# Patient Record
Sex: MALE | Race: WHITE | NOT HISPANIC OR LATINO | URBAN - METROPOLITAN AREA
[De-identification: names, ages, dates, MRNs, and addresses within clinical notes are randomized per-mention and may not be internally consistent; named-entity substitution may affect disease eponyms.]

---

## 2018-03-19 RX ORDER — ZOLPIDEM TARTRATE 10 MG/1
10 TABLET ORAL NIGHTLY
COMMUNITY
Start: 2017-04-24 | End: 2018-03-19 | Stop reason: SDUPTHER

## 2018-03-20 RX ORDER — ZOLPIDEM TARTRATE 10 MG/1
10 TABLET ORAL NIGHTLY
Qty: 90 TABLET | Refills: 0 | Status: SHIPPED | OUTPATIENT
Start: 2018-03-20 | End: 2019-07-29 | Stop reason: SDUPTHER

## 2018-03-20 NOTE — TELEPHONE ENCOUNTER
[facility] website queried no abnormalities noted.  Patient is only receiving controlled substances from my office  Prescription sent electronically

## 2018-04-09 ENCOUNTER — PATIENT OUTREACH (OUTPATIENT)
Dept: CASE MANAGEMENT | Facility: CLINIC | Age: 68
End: 2018-04-09

## 2018-04-09 RX ORDER — PANTOPRAZOLE SODIUM 40 MG/1
40 TABLET, DELAYED RELEASE ORAL
COMMUNITY
End: 2018-09-17 | Stop reason: SDUPTHER

## 2018-04-09 RX ORDER — METFORMIN HYDROCHLORIDE 500 MG/1
500 TABLET ORAL
COMMUNITY
End: 2018-08-27 | Stop reason: SDUPTHER

## 2018-04-09 RX ORDER — MULTIVITAMIN
TABLET ORAL
COMMUNITY
Start: 2015-07-20

## 2018-04-09 RX ORDER — SIMVASTATIN 10 MG/1
10 TABLET, FILM COATED ORAL NIGHTLY
COMMUNITY
End: 2018-11-12 | Stop reason: SDUPTHER

## 2018-04-09 RX ORDER — ASPIRIN 81 MG/1
81 TABLET ORAL DAILY
COMMUNITY
End: 2022-07-24

## 2018-04-09 NOTE — PROGRESS NOTES
"NAME: Zeb Jefferson    MRN: [id number]    YOB: 1950    EVENT DETAILS    Discharging Facility: [facility]  Date of Admission: 04/06/18  Date of Discharge: 04/06/18  Discharge Instructions Reviewed?: Yes         Reason for Admission:  Chest pain      HPI: Spoke with pt. Pt admitted with \"pounding\" in his chest. Pt had been out to dinner and had a cup of coffee late in the evening. Pt developed episodes of \"pounding\" that lasted a few seconds at a time. Pt became concerned when he started to feel weak and tired. In Ed, EKG showed PVC's. Pt ruled out for a MI .     Pt denies chest pain, shortness of breath, lightheadedness, dizziness, nausea, diaphoresis, palpitations, etc. Discussed things that can precipitate PVC's-fatigue, alcohol, caffeine, stress, lack of sleep, etc.       MEDICATION REVIEW:    Reported by:: Patient  Prescriptions Filled?: No (No new medications)     Was a medication discrepancy indentified?: No     Medication understanding?: Yes     Medication Adherence?: Yes     Any side effects from medication?: No       Medication review Reviewed, see medication history    Additional Comments:      FOLLOW-UP TESTS/PROCEDURES:    PCP: 4/10  Dr. Gautam (cardio): needs to schedule  Stress Test: needs to schedule      BARRIERS TO CARE    Self-Care   Living Arrangement: Alone (Pt lives alone. Pt independent in his care. Pt has family/friends who can help if needed. )       Socioeconomic  Financial Problems?: No     Transportation Issues?: No            PLAN OF CARE:    Reviewed signs/symptoms of worsening condition or complication that necessitate a call to the Physician's office.  Educated patient on access to care.  RN phone number given for future care management needs.       Zeynep Shah RN  [phone]    "

## 2018-04-10 ENCOUNTER — OFFICE VISIT (OUTPATIENT)
Dept: FAMILY MEDICINE | Facility: CLINIC | Age: 68
End: 2018-04-10
Payer: MEDICARE

## 2018-04-10 VITALS
DIASTOLIC BLOOD PRESSURE: 62 MMHG | HEART RATE: 66 BPM | WEIGHT: 140 LBS | HEIGHT: 65 IN | TEMPERATURE: 97.7 F | BODY MASS INDEX: 23.32 KG/M2 | OXYGEN SATURATION: 98 % | RESPIRATION RATE: 16 BRPM | SYSTOLIC BLOOD PRESSURE: 130 MMHG

## 2018-04-10 DIAGNOSIS — K22.710 BARRETT'S ESOPHAGUS WITH LOW GRADE DYSPLASIA: ICD-10-CM

## 2018-04-10 DIAGNOSIS — R07.2 PRECORDIAL PAIN: Primary | ICD-10-CM

## 2018-04-10 PROCEDURE — 99213 OFFICE O/P EST LOW 20 MIN: CPT | Performed by: FAMILY MEDICINE

## 2018-04-10 NOTE — PROGRESS NOTES
Subjective      Patient ID: Zeb Jefferson is a 67 y.o. male.    Hospitals in [place]    Facility/Provider transitioning from:  Was the patient treated and released from Er?  Was the patient admitted to hospital?  Date of discharge?  Was the patient contacted within 2 business days of discharge?  New meds?  Discontinued meds?    The following have been reviewed and updated as appropriate in this visit:       Review of Systems    Current Outpatient Prescriptions   Medication Sig Dispense Refill   • aspirin 81 mg enteric coated tablet Take 81 mg by mouth daily.     • metFORMIN (GLUCOPHAGE) 500 mg tablet Take 500 mg by mouth daily with breakfast.       • multivitamin (THERAGRAN) tablet take 1 tablet by oral route  every day     • pantoprazole (PROTONIX) 40 mg EC tablet Take 40 mg by mouth daily before breakfast.     • simvastatin (ZOCOR) 10 mg tablet Take 10 mg by mouth nightly.     • zolpidem (AMBIEN) 10 mg tablet Take 1 tablet (10 mg total) by mouth nightly. 90 tablet 0     No current facility-administered medications for this visit.      Past Medical History:   Diagnosis Date   • Melanoma (CMS/HCC) (HCC)      History reviewed. No pertinent family history.  History reviewed. No pertinent surgical history.  Allergies   Allergen Reactions   • Penicillins      Social History     Social History   • Marital status:      Spouse name: N/A   • Number of children: N/A   • Years of education: N/A     Occupational History   • Not on file.     Social History Main Topics   • Smoking status: Former Smoker   • Smokeless tobacco: Never Used   • Alcohol use Not on file   • Drug use: Unknown   • Sexual activity: Not on file     Other Topics Concern   • Not on file     Social History Narrative   • No narrative on file         Objective     Physical Exam    Assessment/Plan   Problem List Items Addressed This Visit     None          Della Hernandez DO  4/10/2018

## 2018-04-11 ENCOUNTER — TELEPHONE (OUTPATIENT)
Dept: FAMILY MEDICINE | Facility: CLINIC | Age: 68
End: 2018-04-11

## 2018-04-11 NOTE — TELEPHONE ENCOUNTER
Nuclear stress test scheduled 4/17/18 (per pt;s request) 8:00 am arrival.  Does pt have to hold any meds?  Please print new order so I can fax to [phone]

## 2018-04-20 ENCOUNTER — TELEPHONE (OUTPATIENT)
Dept: FAMILY MEDICINE | Facility: CLINIC | Age: 68
End: 2018-04-20

## 2018-05-01 ENCOUNTER — OFFICE VISIT (OUTPATIENT)
Dept: FAMILY MEDICINE | Facility: CLINIC | Age: 68
End: 2018-05-01
Attending: FAMILY MEDICINE
Payer: MEDICARE

## 2018-05-01 VITALS
DIASTOLIC BLOOD PRESSURE: 70 MMHG | HEIGHT: 65 IN | WEIGHT: 140 LBS | HEART RATE: 64 BPM | BODY MASS INDEX: 23.32 KG/M2 | OXYGEN SATURATION: 97 % | SYSTOLIC BLOOD PRESSURE: 120 MMHG | TEMPERATURE: 98 F | RESPIRATION RATE: 16 BRPM

## 2018-05-01 DIAGNOSIS — K22.70 BARRETT'S ESOPHAGUS WITHOUT DYSPLASIA: ICD-10-CM

## 2018-05-01 DIAGNOSIS — G47.9 SLEEP DISORDER: ICD-10-CM

## 2018-05-01 DIAGNOSIS — E78.2 MIXED HYPERLIPIDEMIA: ICD-10-CM

## 2018-05-01 DIAGNOSIS — E11.9 TYPE 2 DIABETES MELLITUS WITHOUT COMPLICATION, WITHOUT LONG-TERM CURRENT USE OF INSULIN (CMS/HCC): Primary | ICD-10-CM

## 2018-05-01 DIAGNOSIS — Z85.820 HX OF MALIGNANT MELANOMA: ICD-10-CM

## 2018-05-01 LAB
ALBUMIN SERPL-MCNC: 4.5 G/DL (ref 3.6–4.8)
ALBUMIN/GLOB SERPL: 1.7 {RATIO} (ref 1.2–2.2)
ALP SERPL-CCNC: 62 IU/L (ref 39–117)
ALT SERPL-CCNC: 20 IU/L (ref 0–44)
AST SERPL-CCNC: 18 IU/L (ref 0–40)
BILIRUB SERPL-MCNC: 0.5 MG/DL (ref 0–1.2)
BUN SERPL-MCNC: 12 MG/DL (ref 8–27)
BUN/CREAT SERPL: 16 (ref 10–24)
CALCIUM SERPL-MCNC: 10.3 MG/DL (ref 8.6–10.2)
CHLORIDE SERPL-SCNC: 101 MMOL/L (ref 96–106)
CHOLEST SERPL-MCNC: 169 MG/DL (ref 100–199)
CO2 SERPL-SCNC: 28 MMOL/L (ref 18–29)
CREAT SERPL-MCNC: 0.76 MG/DL (ref 0.76–1.27)
GFR SERPLBLD CREATININE-BSD FMLA CKD-EPI: 108 ML/MIN/1.73
GFR SERPLBLD CREATININE-BSD FMLA CKD-EPI: 94 ML/MIN/1.73
GLOBULIN SER CALC-MCNC: 2.6 G/DL (ref 1.5–4.5)
GLUCOSE SERPL-MCNC: 108 MG/DL (ref 65–99)
HBA1C MFR BLD: 6 % (ref 4.8–5.6)
HDLC SERPL-MCNC: 56 MG/DL
LDLC SERPL CALC-MCNC: 100 MG/DL (ref 0–99)
POTASSIUM SERPL-SCNC: 5.5 MMOL/L (ref 3.5–5.2)
PROT SERPL-MCNC: 7.1 G/DL (ref 6–8.5)
SODIUM SERPL-SCNC: 143 MMOL/L (ref 134–144)
TRIGL SERPL-MCNC: 67 MG/DL (ref 0–149)
VLDLC SERPL CALC-MCNC: 13 MG/DL (ref 5–40)

## 2018-05-01 PROCEDURE — 99214 OFFICE O/P EST MOD 30 MIN: CPT | Performed by: FAMILY MEDICINE

## 2018-05-01 ASSESSMENT — ENCOUNTER SYMPTOMS
DIARRHEA: 0
PALPITATIONS: 0
FATIGUE: 0
NUMBNESS: 0
CHILLS: 0
COLOR CHANGE: 0
UNEXPECTED WEIGHT CHANGE: 0
CONSTIPATION: 0
NAUSEA: 0
BRUISES/BLEEDS EASILY: 0
HEMATURIA: 0
DIZZINESS: 0
FREQUENCY: 0
SHORTNESS OF BREATH: 0
DYSURIA: 0
COUGH: 0
SLEEP DISTURBANCE: 0
ADENOPATHY: 0
ABDOMINAL PAIN: 0
BLOOD IN STOOL: 0
HEADACHES: 0
ARTHRALGIAS: 0

## 2018-05-01 NOTE — PROGRESS NOTES
Subjective      Patient ID: Zeb Jefferson is a 67 y.o. male.    HPI    Medical management of diabetes, hypertension, hyperlipidemia  Pre visit care team luly performed  A report card has been prepared for today's visit  Following diet - reg exercise    The following have been reviewed and updated as appropriate in this visit:       Review of Systems   Constitutional: Negative for chills, fatigue and unexpected weight change.   Eyes: Positive for visual disturbance (eye exam upcoming).   Respiratory: Negative for cough and shortness of breath.    Cardiovascular: Negative for chest pain, palpitations and leg swelling.   Gastrointestinal: Negative for abdominal pain, blood in stool, constipation, diarrhea and nausea.   Endocrine: Negative for cold intolerance and heat intolerance.   Genitourinary: Negative for dysuria, frequency, hematuria and urgency.   Musculoskeletal: Negative for arthralgias.   Skin: Negative for color change.   Neurological: Negative for dizziness, numbness and headaches.   Hematological: Negative for adenopathy. Does not bruise/bleed easily.   Psychiatric/Behavioral: Negative for sleep disturbance.       Current Outpatient Prescriptions   Medication Sig Dispense Refill   • aspirin 81 mg enteric coated tablet Take 81 mg by mouth daily.     • metFORMIN (GLUCOPHAGE) 500 mg tablet Take 500 mg by mouth daily with breakfast.       • multivitamin (THERAGRAN) tablet take 1 tablet by oral route  every day     • pantoprazole (PROTONIX) 40 mg EC tablet Take 40 mg by mouth daily before breakfast.     • simvastatin (ZOCOR) 10 mg tablet Take 10 mg by mouth nightly.     • zolpidem (AMBIEN) 10 mg tablet Take 1 tablet (10 mg total) by mouth nightly. 90 tablet 0     No current facility-administered medications for this visit.      Past Medical History:   Diagnosis Date   • Melanoma (CMS/HCC) (HCC)      No family history on file.  No past surgical history on file.  Allergies   Allergen Reactions   •  "Penicillins      Social History     Social History   • Marital status:      Spouse name: N/A   • Number of children: N/A   • Years of education: N/A     Occupational History   • Not on file.     Social History Main Topics   • Smoking status: Former Smoker   • Smokeless tobacco: Never Used   • Alcohol use Not on file   • Drug use: Unknown   • Sexual activity: Not on file     Other Topics Concern   • Not on file     Social History Narrative   • No narrative on file     Vitals:    05/01/18 0837 05/01/18 0912   BP: 112/62 120/70   BP Location:  Left upper arm   Patient Position:  Sitting   Pulse: 64    Resp: 16    Temp: 36.7 °C (98 °F)    SpO2: 97%    Weight: 63.5 kg (140 lb)    Height: 1.638 m (5' 4.5\")      Objective     Physical Exam   Constitutional: He appears well-developed and well-nourished.   HENT:   Head: Normocephalic.   Eyes: Pupils are equal, round, and reactive to light.   Fundoscopic exam:       The right eye shows no AV nicking and no exudate.        The left eye shows no AV nicking and no exudate.   Neck: No JVD present. Carotid bruit is not present. No thyromegaly present.   Cardiovascular: Regular rhythm and normal pulses.    No murmur heard.  Pulmonary/Chest: Breath sounds normal.   Abdominal: Soft. Normal appearance, normal aorta and bowel sounds are normal. There is no hepatosplenomegaly. There is no tenderness.   Lymphadenopathy:     He has no cervical adenopathy.     He has no axillary adenopathy.   Neurological: He is alert. No sensory deficit.   Skin: Skin is warm and dry.   Psychiatric: His behavior is normal. Cognition and memory are normal.       Assessment/Plan   Problem List Items Addressed This Visit     Barretts esophagus     Patient was counselled regarding triggers for symptoms - avoid caffeine/spicey foods/NO smoking  Elevate HOB 30 degrees - DO NOT lie flat for at least 30 minutes after eating  Take medications as directed         Hx of malignant melanoma     SHARRON - continue " surveillance         Mixed hyperlipidemia     GOOD hyperlipidemia controll  CONTINUE CURRENT THERAPY  Recommend low fat diet - Risks and potential complications of hyperlipidemia reviewed  Role of medications,diet, exercise in the treatment of hyperlipidemia discussed   Treatment plan and follow up reviewed and understood by patient         Relevant Orders    Lipid panel (Completed)    Sleep disorder     same meds - long term safety meds discussed         Type 2 diabetes mellitus without complication, without long-term current use of insulin (CMS/HCC) (HCC) - Primary     Good diabetic control.  Current medications reviewed.  Recommend diet modifications and weight loss.   Met with patient to review diet, exercise, and glucometer readings  Reviewed medications and the importance of compliance  Diabetes health maintenance plan and follow up was discussed and understood by patient         Relevant Orders    Comprehensive metabolic panel (Completed)    Hemoglobin A1c (Completed)    Microalbumin / creatinine urine ratio (Completed)          Della Hernandez DO  5/3/2018

## 2018-05-02 ENCOUNTER — TELEPHONE (OUTPATIENT)
Dept: FAMILY MEDICINE | Facility: CLINIC | Age: 68
End: 2018-05-02

## 2018-05-02 LAB
ALBUMIN/CREAT UR: 11.6 MG/G CREAT (ref 0–30)
CREAT UR-MCNC: 180.1 MG/DL
MICROALBUMIN UR-MCNC: 20.9 UG/ML

## 2018-05-02 NOTE — TELEPHONE ENCOUNTER
----- Message from Della Hernandez DO sent at 5/2/2018  3:30 PM EDT -----  Call pt - labs all GREAT - blood sugar 108/A1c 6.0 - no protein in urine - cholesterol panel at goal -  Same meds/diet - keep up the good work

## 2018-05-03 NOTE — ASSESSMENT & PLAN NOTE
GOOD hyperlipidemia controll  CONTINUE CURRENT THERAPY  Recommend low fat diet - Risks and potential complications of hyperlipidemia reviewed  Role of medications,diet, exercise in the treatment of hyperlipidemia discussed   Treatment plan and follow up reviewed and understood by patient

## 2018-08-27 RX ORDER — METFORMIN HYDROCHLORIDE 500 MG/1
TABLET ORAL
Qty: 90 TABLET | Refills: 0 | Status: SHIPPED | OUTPATIENT
Start: 2018-08-27 | End: 2019-03-25 | Stop reason: SDUPTHER

## 2018-09-17 RX ORDER — PANTOPRAZOLE SODIUM 40 MG/1
TABLET, DELAYED RELEASE ORAL
Qty: 90 TABLET | Refills: 1 | Status: SHIPPED | OUTPATIENT
Start: 2018-09-17 | End: 2019-04-15 | Stop reason: SDUPTHER

## 2018-09-25 ENCOUNTER — OFFICE VISIT (OUTPATIENT)
Dept: FAMILY MEDICINE | Facility: CLINIC | Age: 68
End: 2018-09-25
Payer: MEDICARE

## 2018-09-25 VITALS
TEMPERATURE: 97.8 F | WEIGHT: 139 LBS | HEART RATE: 57 BPM | DIASTOLIC BLOOD PRESSURE: 60 MMHG | RESPIRATION RATE: 16 BRPM | OXYGEN SATURATION: 97 % | SYSTOLIC BLOOD PRESSURE: 118 MMHG | BODY MASS INDEX: 23.16 KG/M2 | HEIGHT: 65 IN

## 2018-09-25 DIAGNOSIS — E78.2 MIXED HYPERLIPIDEMIA: ICD-10-CM

## 2018-09-25 DIAGNOSIS — R04.0 NASAL HEMORRHAGE: ICD-10-CM

## 2018-09-25 DIAGNOSIS — E11.9 TYPE 2 DIABETES MELLITUS WITHOUT COMPLICATION, WITHOUT LONG-TERM CURRENT USE OF INSULIN (CMS/HCC): ICD-10-CM

## 2018-09-25 DIAGNOSIS — R35.1 NOCTURIA: ICD-10-CM

## 2018-09-25 DIAGNOSIS — Z00.00 MEDICARE ANNUAL WELLNESS VISIT, SUBSEQUENT: Primary | ICD-10-CM

## 2018-09-25 DIAGNOSIS — Z85.820 HX OF MALIGNANT MELANOMA: ICD-10-CM

## 2018-09-25 DIAGNOSIS — K22.70 BARRETT'S ESOPHAGUS WITHOUT DYSPLASIA: ICD-10-CM

## 2018-09-25 DIAGNOSIS — Z23 NEED FOR INFLUENZA VACCINATION: ICD-10-CM

## 2018-09-25 PROCEDURE — 99214 OFFICE O/P EST MOD 30 MIN: CPT | Mod: 25 | Performed by: FAMILY MEDICINE

## 2018-09-25 PROCEDURE — 90653 IIV ADJUVANT VACCINE IM: CPT | Performed by: FAMILY MEDICINE

## 2018-09-25 PROCEDURE — G0008 ADMIN INFLUENZA VIRUS VAC: HCPCS | Performed by: FAMILY MEDICINE

## 2018-09-25 PROCEDURE — G0439 PPPS, SUBSEQ VISIT: HCPCS | Performed by: FAMILY MEDICINE

## 2018-09-25 PROCEDURE — 36415 COLL VENOUS BLD VENIPUNCTURE: CPT | Performed by: FAMILY MEDICINE

## 2018-09-25 ASSESSMENT — ENCOUNTER SYMPTOMS
SHORTNESS OF BREATH: 0
DYSURIA: 0
CONSTIPATION: 0
NUMBNESS: 0
CHEST TIGHTNESS: 0
SINUS PAIN: 0
HEADACHES: 0
SORE THROAT: 0
BACK PAIN: 1
UNEXPECTED WEIGHT CHANGE: 0
ARTHRALGIAS: 0
SLEEP DISTURBANCE: 0
PALPITATIONS: 0
COLOR CHANGE: 0
DIARRHEA: 0
FATIGUE: 0
CHILLS: 0
ADENOPATHY: 0
COUGH: 0
NAUSEA: 0
DIZZINESS: 0
HEMATURIA: 0
SINUS PRESSURE: 0
ABDOMINAL PAIN: 0
BRUISES/BLEEDS EASILY: 0
BLOOD IN STOOL: 0
FREQUENCY: 0
VOICE CHANGE: 0

## 2018-09-25 ASSESSMENT — MINI COG
TOTAL SCORE: 5
COMPLETED: YES

## 2018-09-25 NOTE — PROGRESS NOTES
Subjective      Patient ID: Zeb Jefferson is a 68 y.o. male.    HPI   Generally well  Following diet - walking for exercise  concerns : no    The following have been reviewed and updated as appropriate in this visit:  Problems       Review of Systems   Constitutional: Negative for chills, fatigue and unexpected weight change.   HENT: Negative for dental problem, hearing loss, sinus pain, sinus pressure, sore throat and voice change.    Eyes: Negative for visual disturbance.   Respiratory: Negative for cough, chest tightness and shortness of breath.    Cardiovascular: Negative for chest pain, palpitations and leg swelling.   Gastrointestinal: Negative for abdominal pain, blood in stool, constipation, diarrhea and nausea.        NO GERD breakthrough   Endocrine: Negative for cold intolerance and heat intolerance.   Genitourinary: Negative for dysuria, frequency, hematuria and urgency.        2x nocturia - stream OK   Musculoskeletal: Positive for back pain (stiffness). Negative for arthralgias.   Skin: Negative for color change and rash.        BCC - seeing [facility] derm   Neurological: Negative for dizziness, numbness and headaches.   Hematological: Negative for adenopathy. Does not bruise/bleed easily.   Psychiatric/Behavioral: Negative for sleep disturbance.       Current Outpatient Prescriptions   Medication Sig Dispense Refill   • aspirin 81 mg enteric coated tablet Take 81 mg by mouth daily.     • metFORMIN (GLUCOPHAGE) 500 mg tablet TAKE 1 TABLET WITH EVENING MEAL 90 tablet 0   • multivitamin (THERAGRAN) tablet take 1 tablet by oral route  every day     • pantoprazole (PROTONIX) 40 mg EC tablet TAKE 1 TABLET EVERY DAY 90 tablet 1   • simvastatin (ZOCOR) 10 mg tablet Take 10 mg by mouth nightly.       No current facility-administered medications for this visit.      Past Medical History:   Diagnosis Date   • Melanoma (CMS/HCC) (HCC)      History reviewed. No pertinent family history.  Past Surgical History:  "  Procedure Laterality Date   • SKIN CANCER DESTRUCTION       Allergies   Allergen Reactions   • Penicillins      Social History     Social History   • Marital status:      Spouse name: N/A   • Number of children: N/A   • Years of education: N/A     Occupational History   • Not on file.     Social History Main Topics   • Smoking status: Former Smoker   • Smokeless tobacco: Never Used   • Alcohol use Yes   • Drug use: Unknown   • Sexual activity: Not on file     Other Topics Concern   • Not on file     Social History Narrative   • No narrative on file     Vitals:    09/25/18 0849 09/25/18 0921   BP: 118/70 118/60   Pulse: (!) 57    Resp: 16    Temp: 36.6 °C (97.8 °F)    SpO2: 97%    Weight: 63 kg (139 lb)    Height: 1.638 m (5' 4.5\")        Objective     Physical Exam   Constitutional: He is oriented to person, place, and time. He appears well-developed and well-nourished.   HENT:   Head: Normocephalic.   Right Ear: Tympanic membrane and external ear normal.   Left Ear: Tympanic membrane and external ear normal.   Nose: Nose normal. No epistaxis.   Mouth/Throat: Uvula is midline and oropharynx is clear and moist. No oropharyngeal exudate.   Eyes: Conjunctivae and EOM are normal. Pupils are equal, round, and reactive to light.   Fundoscopic exam:       The right eye shows no AV nicking.        The left eye shows no AV nicking.   Neck: Carotid bruit is not present. No thyromegaly present.   Cardiovascular: Normal rate, regular rhythm, normal heart sounds, intact distal pulses and normal pulses.    No murmur heard.  Pulmonary/Chest: Effort normal and breath sounds normal.   Abdominal: Soft. Normal appearance, normal aorta and bowel sounds are normal. There is no hepatosplenomegaly. There is no tenderness. There is no CVA tenderness. Hernia confirmed negative in the right inguinal area and confirmed negative in the left inguinal area.   Genitourinary: Rectum normal and prostate normal.   Lymphadenopathy:     He " has no cervical adenopathy.     He has no axillary adenopathy.   Neurological: He is alert and oriented to person, place, and time. No sensory deficit.   Skin: Skin is warm and dry.   Psychiatric: He has a normal mood and affect. His speech is normal and behavior is normal. Cognition and memory are normal.       Assessment/Plan   Problem List Items Addressed This Visit     Barretts esophagus     Patient was counselled regarding triggers for symptoms - avoid caffeine/spicey foods/NO smoking  Elevate HOB 30 degrees - DO NOT lie flat for at least 30 minutes after eating  Take medications as directed  Serial upper endoscopy         Hx of malignant melanoma     [facility]ARRON - f/u with [facility]         Mixed hyperlipidemia     GOOD hyperlipidemia controll  CONTINUE CURRENT THERAPY  Recommend low fat diet - Risks and potential complications of hyperlipidemia reviewed  Role of medications,diet, exercise in the treatment of hyperlipidemia discussed   Treatment plan and follow up reviewed and understood by patient         Type 2 diabetes mellitus without complication, without long-term current use of insulin (CMS/[facility]) (HCC)     Good diabetic control.  Current medications reviewed.  Recommend diet modifications and weight loss.   Met with patient to review diet, exercise, and glucometer readings  Reviewed medications and the importance of compliance  Diabetes health maintenance plan and follow up was discussed and understood by patient         Relevant Orders    Basic metabolic panel    Hemoglobin A1c    Nocturia     Prostate cancer screening discussed - check PSA         Relevant Orders    PSA      Other Visit Diagnoses     Medicare annual wellness visit, subsequent    -  Primary    Nasal hemorrhage        Relevant Orders    CBC and Differential (Completed)    Need for influenza vaccination        Relevant Orders    Influenza vaccine 65 and older IM preservative free (FluAd) (Completed)          Della Hernandez, DO  9/26/2018

## 2018-09-25 NOTE — PATIENT INSTRUCTIONS
"Patient Education   Patient Education   Influenza (Flu) Vaccine (Inactivated or Recombinant): What You Need to Know  1. Why get vaccinated?  Influenza (\"flu\") is a contagious disease that spreads around the [place] every year, usually between October and May.  Flu is caused by influenza viruses, and is spread mainly by coughing, sneezing, and close contact.  Anyone can get flu. Flu strikes suddenly and can last several days. Symptoms vary by age, but can include:  · fever/chills  · sore throat  · muscle aches  · fatigue  · cough  · headache  · runny or stuffy nose  Flu can also lead to pneumonia and blood infections, and cause diarrhea and seizures in children. If you have a medical condition, such as heart or lung disease, flu can make it worse.  Flu is more dangerous for some people. Infants and young children, people 65 years of age and older, pregnant women, and people with certain health conditions or a weakened immune system are at greatest risk.  Each year thousands of people in the [place] die from flu, and many more are hospitalized.  Flu vaccine can:  · keep you from getting flu,  · make flu less severe if you do get it, and  · keep you from spreading flu to your family and other people.  2. Inactivated and recombinant flu vaccines  A dose of flu vaccine is recommended every flu season. Children 6 months through 8 years of age may need two doses during the same flu season. Everyone else needs only one dose each flu season.  Some inactivated flu vaccines contain a very small amount of a mercury-based preservative called thimerosal. Studies have not shown thimerosal in vaccines to be harmful, but flu vaccines that do not contain thimerosal are available.  There is no live flu virus in flu shots. They cannot cause the flu.  There are many flu viruses, and they are always changing. Each year a new flu vaccine is made to protect against three or four viruses that are likely to cause disease in the " upcoming flu season. But even when the vaccine doesn't exactly match these viruses, it may still provide some protection.  Flu vaccine cannot prevent:  · flu that is caused by a virus not covered by the vaccine, or  · illnesses that look like flu but are not.  It takes about 2 weeks for protection to develop after vaccination, and protection lasts through the flu season.  3. Some people should not get this vaccine  Tell the person who is giving you the vaccine:  · If you have any severe, life-threatening allergies. If you ever had a life-threatening allergic reaction after a dose of flu vaccine, or have a severe allergy to any part of this vaccine, you may be advised not to get vaccinated. Most, but not all, types of flu vaccine contain a small amount of egg protein.  · If you ever had Guillain-Barré Syndrome (also called GBS). Some people with a history of GBS should not get this vaccine. This should be discussed with your doctor.  · If you are not feeling well. It is usually okay to get flu vaccine when you have a mild illness, but you might be asked to come back when you feel better.  4. Risks of a vaccine reaction  With any medicine, including vaccines, there is a chance of reactions. These are usually mild and go away on their own, but serious reactions are also possible.  Most people who get a flu shot do not have any problems with it.  Minor problems following a flu shot include:  · soreness, redness, or swelling where the shot was given  · hoarseness  · sore, red or itchy eyes  · cough  · fever  · aches  · headache  · itching  · fatigue  If these problems occur, they usually begin soon after the shot and last 1 or 2 days.  More serious problems following a flu shot can include the following:  · There may be a small increased risk of Guillain-Oxnard Syndrome (GBS) after inactivated flu vaccine. This risk has been estimated at 1 or 2 additional cases per million people vaccinated. This is much lower than the  risk of severe complications from flu, which can be prevented by flu vaccine.  · Young children who get the flu shot along with pneumococcal vaccine (PCV13) and/or DTaP vaccine at the same time might be slightly more likely to have a seizure caused by fever. Ask your doctor for more information. Tell your doctor if a child who is getting flu vaccine has ever had a seizure.  Problems that could happen after any injected vaccine:  · People sometimes faint after a medical procedure, including vaccination. Sitting or lying down for about 15 minutes can help prevent fainting, and injuries caused by a fall. Tell your doctor if you feel dizzy, or have vision changes or ringing in the ears.  · Some people get severe pain in the shoulder and have difficulty moving the arm where a shot was given. This happens very rarely.  · Any medication can cause a severe allergic reaction. Such reactions from a vaccine are very rare, estimated at about 1 in a million doses, and would happen within a few minutes to a few hours after the vaccination.  As with any medicine, there is a very remote chance of a vaccine causing a serious injury or death.  The safety of vaccines is always being monitored. For more information, visit: www.cdc.gov/vaccinesafety/  5. What if there is a serious reaction?  What should I look for?  Look for anything that concerns you, such as signs of a severe allergic reaction, very high fever, or unusual behavior.  Signs of a severe allergic reaction can include hives, swelling of the face and throat, difficulty breathing, a fast heartbeat, dizziness, and weakness. These would start a few minutes to a few hours after the vaccination.  What should I do?  · If you think it is a severe allergic reaction or other emergency that can't wait, call 9-1-1 and get the person to the nearest hospital. Otherwise, call your doctor.  · Reactions should be reported to the Vaccine Adverse Event Reporting System (VAERS). Your doctor  should file this report, or you can do it yourself through the VAERS web site at www.vaers.hhs.gov, or by calling 1-961.335.4861.  ¨ VAERS does not give medical advice.  6. The National Vaccine Injury Compensation Program  The National Vaccine Injury Compensation Program (VICP) is a federal program that was created to compensate people who may have been injured by certain vaccines.  Persons who believe they may have been injured by a vaccine can learn about the program and about filing a claim by calling 1-475.558.2333 or visiting the VICP website at www.[facility].gov/vaccinecompensation. There is a time limit to file a claim for compensation.  7. How can I learn more?  · Ask your healthcare provider. He or she can give you the vaccine package insert or suggest other sources of information.  · Call your local or state health department.  · Contact the Centers for Disease Control and Prevention (CDC):  ¨ Call 1-997.145.3280 (6-991-FGD-INFO) or  ¨ Visit CDC's website at www.cdc.gov/flu  Vaccine Information Statement, Inactivated Influenza Vaccine (08/07/2015)  This information is not intended to replace advice given to you by your health care provider. Make sure you discuss any questions you have with your health care provider.  Document Released: 10/12/2007 Document Revised: 09/07/2017 Document Reviewed: 09/07/2017  [employer] Interactive Patient Education © 2017 [employer]       Fall Prevention in the Home  Falls can cause injuries. They can happen to people of all ages. There are many things you can do to make your home safe and to help prevent falls.  What can I do on the outside of my home?  · Regularly fix the edges of walkways and driveways and fix any cracks.  · Remove anything that might make you trip as you walk through a door, such as a raised step or threshold.  · Trim any bushes or trees on the path to your home.  · Use bright outdoor lighting.  · Clear any walking paths of anything that might make someone  trip, such as rocks or tools.  · Regularly check to see if handrails are loose or broken. Make sure that both sides of any steps have handrails.  · Any raised decks and porches should have guardrails on the edges.  · Have any leaves, snow, or ice cleared regularly.  · Use sand or salt on walking paths during winter.  · Clean up any spills in your garage right away. This includes oil or grease spills.  What can I do in the bathroom?  · Use night lights.  · Install grab bars by the toilet and in the tub and shower. Do not use towel bars as grab bars.  · Use non-skid mats or decals in the tub or shower.  · If you need to sit down in the shower, use a plastic, non-slip stool.  · Keep the floor dry. Clean up any water that spills on the floor as soon as it happens.  · Remove soap buildup in the tub or shower regularly.  · Attach bath mats securely with double-sided non-slip rug tape.  · Do not have throw rugs and other things on the floor that can make you trip.  What can I do in the bedroom?  · Use night lights.  · Make sure that you have a light by your bed that is easy to reach.  · Do not use any sheets or blankets that are too big for your bed. They should not hang down onto the floor.  · Have a firm chair that has side arms. You can use this for support while you get dressed.  · Do not have throw rugs and other things on the floor that can make you trip.  What can I do in the kitchen?  · Clean up any spills right away.  · Avoid walking on wet floors.  · Keep items that you use a lot in easy-to-reach places.  · If you need to reach something above you, use a strong step stool that has a grab bar.  · Keep electrical cords out of the way.  · Do not use floor polish or wax that makes floors slippery. If you must use wax, use non-skid floor wax.  · Do not have throw rugs and other things on the floor that can make you trip.  What can I do with my stairs?  · Do not leave any items on the stairs.  · Make sure that there  are handrails on both sides of the stairs and use them. Fix handrails that are broken or loose. Make sure that handrails are as long as the stairways.  · Check any carpeting to make sure that it is firmly attached to the stairs. Fix any carpet that is loose or worn.  · Avoid having throw rugs at the top or bottom of the stairs. If you do have throw rugs, attach them to the floor with carpet tape.  · Make sure that you have a light switch at the top of the stairs and the bottom of the stairs. If you do not have them, ask someone to add them for you.  What else can I do to help prevent falls?  · Wear shoes that:  ¨ Do not have high heels.  ¨ Have rubber bottoms.  ¨ Are comfortable and fit you well.  ¨ Are closed at the toe. Do not wear sandals.  · If you use a stepladder:  ¨ Make sure that it is fully opened. Do not climb a closed stepladder.  ¨ Make sure that both sides of the stepladder are locked into place.  ¨ Ask someone to hold it for you, if possible.  · Clearly leonarda and make sure that you can see:  ¨ Any grab bars or handrails.  ¨ First and last steps.  ¨ Where the edge of each step is.  · Use tools that help you move around (mobility aids) if they are needed. These include:  ¨ Canes.  ¨ Walkers.  ¨ Scooters.  ¨ Crutches.  · Turn on the lights when you go into a dark area. Replace any light bulbs as soon as they burn out.  · Set up your furniture so you have a clear path. Avoid moving your furniture around.  · If any of your floors are uneven, fix them.  · If there are any pets around you, be aware of where they are.  · Review your medicines with your doctor. Some medicines can make you feel dizzy. This can increase your chance of falling.  Ask your doctor what other things that you can do to help prevent falls.  This information is not intended to replace advice given to you by your health care provider. Make sure you discuss any questions you have with your health care provider.  Document Released:  10/14/2010 Document Revised: 05/25/2017 Document Reviewed: 01/22/2016  [employer] Interactive Patient Education © 2018 [employer]                                      Men's Personalized Prevention Plan Services (PPPS)        Preventive Services Checklist (Assumes Average Risk Unless Otherwise Noted)  Preventive Service Target Population and Frequency Last Done Date Due   Abdominal Aortic Aneurysm Screening Any 1 risk factor: 1) family history of AAA or 2) 65-74yo and smoked 100+ cigarettes in a lifetime (covered once)     Alcohol Misuse Screening As necessary for those at risk (covered annually)     Cholesterol Screening As necessary >=36yo; 20-36yo if increased risk coronary artery disease (covered every 5 years)      Colorectal Cancer Screening >=49yo: Colonoscopy every 10 years, FIT annually or Cologuard every 3 years (up to 86yo for Cologuard)     Diabetes Screening Any 1 risk factor: hypertension, dyslipidemia, obesity, high glucose; or Any 2 risk factors: >=64 yo, overweight, family history diabetes (covered every 6 months)     Glaucoma Screening Any 1 risk factor: 1) diabetes, 2) family history glaucoma, 3)  >=49yo, 4)  American >=64yo (covered annually)     Hepatitis C Screening Any 1 risk factor: 1) born between 4554-0274, 2) blood transfusion before 1992, 3) current or past injection drug use (covered once for average risk, annually for high risk)     Lung Cancer Screening Low-dose chest CT if all 3 risk factors: 1) 55-76yo, 2) smoker or quit within last 15y, 3) >=30 pack years (covered annually)     Prostate Cancer Screening 55-69 years old if patient desires test after weighing potential harms and benefits (covered annually)     Sexually Transmitted Diseases (STDs) As necessary chlamydia, gonorrhea, syphilis, hepatitis B (covered annually)  HIV if any 1 risk factor present: 1) <14yo or >64yo and at increased risk or 2) 15-64yo and ask for it (covered annually)     Vaccine:  "Hepatitis B As necessary if medium or high risk (series covered once)     Vaccine: Influenza All without contraindications (covered annually.)     Vaccine: Pneumococcal Pneumovax + Prevnar (both covered, >=11 months apart)     Vaccine: Zoster (Shingles) >= 59yo without contraindications (covered once by Part D)     Other Services:                     Men's Personalized Prevention Plan Services (PPPS)       Prints to AVS                                          Health Risk Factors and Interventions  \"x\" Indicates risk is associated with this patient  Risk Factor Recommended Interventions     Family history of      Obesity     Hypertension     Diabetes     Fall Risk     Tobacco Use     Other:    Patient was given routine advice on diet, exercise, and weight reduction.  Recommend a repeat PE in 1 year  The pros and cons on PSA testing were discussed and he has chosen to have a PSA done  Discussed colorectal screening  "

## 2018-09-25 NOTE — PROGRESS NOTES
Subjective      Patient ID: Zeb Jefferson is a 68 y.o. male.    HPI    The following have been reviewed and updated as appropriate in this visit:       Review of Systems    Current Outpatient Prescriptions   Medication Sig Dispense Refill   • aspirin 81 mg enteric coated tablet Take 81 mg by mouth daily.     • metFORMIN (GLUCOPHAGE) 500 mg tablet TAKE 1 TABLET WITH EVENING MEAL 90 tablet 0   • multivitamin (THERAGRAN) tablet take 1 tablet by oral route  every day     • pantoprazole (PROTONIX) 40 mg EC tablet TAKE 1 TABLET EVERY DAY 90 tablet 1   • simvastatin (ZOCOR) 10 mg tablet Take 10 mg by mouth nightly.       No current facility-administered medications for this visit.      Past Medical History:   Diagnosis Date   • Melanoma (CMS/HCC) (HCC)      History reviewed. No pertinent family history.  Past Surgical History:   Procedure Laterality Date   • SKIN CANCER DESTRUCTION       Allergies   Allergen Reactions   • Penicillins      Social History     Social History   • Marital status:      Spouse name: N/A   • Number of children: N/A   • Years of education: N/A     Occupational History   • Not on file.     Social History Main Topics   • Smoking status: Former Smoker   • Smokeless tobacco: Never Used   • Alcohol use Yes   • Drug use: Unknown   • Sexual activity: Not on file     Other Topics Concern   • Not on file     Social History Narrative   • No narrative on file         Objective     Physical Exam    Assessment/Plan   Problem List Items Addressed This Visit     None      Visit Diagnoses     Need for influenza vaccination    -  Primary    Relevant Orders    Influenza vaccine 65 and older IM preservative free (FluAd) (Completed)          Della Hernandez DO  9/25/2018        Subjective     Zeb Jefferson is a 68 y.o. male who presents for a subsequent annual wellness visit.     Comprehensive Medical and Social History  Patient Active Problem List   Diagnosis   • Barretts esophagus   • Hx of  "malignant melanoma   • Mixed hyperlipidemia   • Sleep disorder   • Type 2 diabetes mellitus without complication, without long-term current use of insulin (CMS/HCC) (HCC)     Past Medical History:   Diagnosis Date   • Melanoma (CMS/HCC) (HCC)      Past Surgical History:   Procedure Laterality Date   • SKIN CANCER DESTRUCTION       Allergies   Allergen Reactions   • Penicillins      Current Outpatient Prescriptions   Medication Sig Dispense Refill   • aspirin 81 mg enteric coated tablet Take 81 mg by mouth daily.     • metFORMIN (GLUCOPHAGE) 500 mg tablet TAKE 1 TABLET WITH EVENING MEAL 90 tablet 0   • multivitamin (THERAGRAN) tablet take 1 tablet by oral route  every day     • pantoprazole (PROTONIX) 40 mg EC tablet TAKE 1 TABLET EVERY DAY 90 tablet 1   • simvastatin (ZOCOR) 10 mg tablet Take 10 mg by mouth nightly.       No current facility-administered medications for this visit.      Social History     Social History   • Marital status:      Spouse name: N/A   • Number of children: N/A   • Years of education: N/A     Social History Main Topics   • Smoking status: Former Smoker   • Smokeless tobacco: Never Used   • Alcohol use Yes   • Drug use: Unknown   • Sexual activity: Not Asked     Other Topics Concern   • None     Social History Narrative   • None       Objective   Vitals  Vitals:    09/25/18 0849   BP: 118/70   Pulse: (!) 57   Resp: 16   Temp: 36.6 °C (97.8 °F)   SpO2: 97%   Weight: 63 kg (139 lb)   Height: 1.638 m (5' 4.5\")     Body mass index is 23.49 kg/m².    Advanced Care Plan  Does patient have advance directive?: Yes                                     PHQ  Over the Past 2 Weeks, Have You Felt Down, Depressed or Hopeless?: yes  Over the Past 2 Weeks, Have You Felt Little Interest or Pleasure In Doing Things?: no  Little Interest or Pleasure in Doing Things: 0-->not at all   Feeling Down, Depressed or Hopeless: 1-->several days   Trouble Falling or Staying Asleep, or Sleeping Too Much: 2-->more " than half the days       Poor Appetite or Overeatin-->not at all                       Mini Cog  Completed: Yes  Score: 5  Result: Negative    Get Up and Go  Result: Pass            STEADI Falls Risk  One or more falls in the last year: No                                                       Falls screen completed: Yes     Hearing and Vision Screening  No exam data present      Assessment/Plan   Problem List Items Addressed This Visit     None      Visit Diagnoses     Need for influenza vaccination    -  Primary    Relevant Orders    Influenza vaccine 65 and older IM preservative free (FluAd) (Completed)              See Patient Instructions (the written plan) which was given to the patient for PPPS and health risk factors with interventions.

## 2018-09-26 ENCOUNTER — TELEPHONE (OUTPATIENT)
Dept: FAMILY MEDICINE | Facility: CLINIC | Age: 68
End: 2018-09-26

## 2018-09-26 LAB
BASOPHILS # BLD AUTO: 0 X10E3/UL (ref 0–0.2)
BASOPHILS NFR BLD AUTO: 0 %
BUN SERPL-MCNC: 9 MG/DL (ref 8–27)
BUN/CREAT SERPL: 11 (ref 10–24)
CALCIUM SERPL-MCNC: 9.7 MG/DL (ref 8.6–10.2)
CHLORIDE SERPL-SCNC: 100 MMOL/L (ref 96–106)
CO2 SERPL-SCNC: 25 MMOL/L (ref 20–29)
CREAT SERPL-MCNC: 0.82 MG/DL (ref 0.76–1.27)
EOSINOPHIL # BLD AUTO: 0.1 X10E3/UL (ref 0–0.4)
EOSINOPHIL NFR BLD AUTO: 1 %
ERYTHROCYTE [DISTWIDTH] IN BLOOD BY AUTOMATED COUNT: 14.6 % (ref 12.3–15.4)
GLUCOSE SERPL-MCNC: 109 MG/DL (ref 65–99)
HBA1C MFR BLD: 6 % (ref 4.8–5.6)
HCT VFR BLD AUTO: 44.5 % (ref 37.5–51)
HGB BLD-MCNC: 14.6 G/DL (ref 13–17.7)
IMM GRANULOCYTES # BLD: 0 X10E3/UL (ref 0–0.1)
IMM GRANULOCYTES NFR BLD: 0 %
LAB CORP EGFR IF AFRICN AM: 104 ML/MIN/1.73
LAB CORP EGFR IF NONAFRICN AM: 90 ML/MIN/1.73
LYMPHOCYTES # BLD AUTO: 2.9 X10E3/UL (ref 0.7–3.1)
LYMPHOCYTES NFR BLD AUTO: 29 %
MCH RBC QN AUTO: 29.4 PG (ref 26.6–33)
MCHC RBC AUTO-ENTMCNC: 32.8 G/DL (ref 31.5–35.7)
MCV RBC AUTO: 90 FL (ref 79–97)
MONOCYTES # BLD AUTO: 0.6 X10E3/UL (ref 0.1–0.9)
MONOCYTES NFR BLD AUTO: 6 %
NEUTROPHILS # BLD AUTO: 6.3 X10E3/UL (ref 1.4–7)
NEUTROPHILS NFR BLD AUTO: 64 %
PLATELET # BLD AUTO: 360 X10E3/UL (ref 150–379)
POTASSIUM SERPL-SCNC: 4.5 MMOL/L (ref 3.5–5.2)
PSA SERPL-MCNC: 0.6 NG/ML (ref 0–4)
RBC # BLD AUTO: 4.96 X10E6/UL (ref 4.14–5.8)
SODIUM SERPL-SCNC: 143 MMOL/L (ref 134–144)
WBC # BLD AUTO: 10 X10E3/UL (ref 3.4–10.8)

## 2018-09-26 NOTE — ASSESSMENT & PLAN NOTE
Patient was counselled regarding triggers for symptoms - avoid caffeine/spicey foods/NO smoking  Elevate HOB 30 degrees - DO NOT lie flat for at least 30 minutes after eating  Take medications as directed  Serial upper endoscopy

## 2018-11-12 RX ORDER — SIMVASTATIN 10 MG/1
TABLET, FILM COATED ORAL
Qty: 90 TABLET | Refills: 1 | Status: SHIPPED | OUTPATIENT
Start: 2018-11-12 | End: 2019-07-29 | Stop reason: SDUPTHER

## 2019-01-30 ENCOUNTER — OFFICE VISIT (OUTPATIENT)
Dept: FAMILY MEDICINE | Facility: CLINIC | Age: 69
End: 2019-01-30
Payer: MEDICARE

## 2019-01-30 VITALS
TEMPERATURE: 98.5 F | RESPIRATION RATE: 16 BRPM | DIASTOLIC BLOOD PRESSURE: 64 MMHG | BODY MASS INDEX: 23.49 KG/M2 | OXYGEN SATURATION: 95 % | HEIGHT: 65 IN | WEIGHT: 141 LBS | SYSTOLIC BLOOD PRESSURE: 130 MMHG | HEART RATE: 74 BPM

## 2019-01-30 DIAGNOSIS — E78.2 MIXED HYPERLIPIDEMIA: ICD-10-CM

## 2019-01-30 DIAGNOSIS — K22.70 BARRETT'S ESOPHAGUS WITHOUT DYSPLASIA: ICD-10-CM

## 2019-01-30 DIAGNOSIS — E11.9 TYPE 2 DIABETES MELLITUS WITHOUT COMPLICATION, WITHOUT LONG-TERM CURRENT USE OF INSULIN (CMS/HCC): Primary | ICD-10-CM

## 2019-01-30 LAB
ALBUMIN SERPL-MCNC: 4.5 G/DL (ref 3.6–4.8)
ALBUMIN/GLOB SERPL: 2 {RATIO} (ref 1.2–2.2)
ALP SERPL-CCNC: 54 IU/L (ref 39–117)
ALT SERPL-CCNC: 18 IU/L (ref 0–44)
AST SERPL-CCNC: 16 IU/L (ref 0–40)
BILIRUB SERPL-MCNC: 0.5 MG/DL (ref 0–1.2)
BUN SERPL-MCNC: 11 MG/DL (ref 8–27)
BUN/CREAT SERPL: 15 (ref 10–24)
CALCIUM SERPL-MCNC: 10.2 MG/DL (ref 8.6–10.2)
CHLORIDE SERPL-SCNC: 101 MMOL/L (ref 96–106)
CHOLEST SERPL-MCNC: 154 MG/DL (ref 100–199)
CO2 SERPL-SCNC: 26 MMOL/L (ref 20–29)
CREAT SERPL-MCNC: 0.74 MG/DL (ref 0.76–1.27)
GLOBULIN SER CALC-MCNC: 2.3 G/DL (ref 1.5–4.5)
GLUCOSE SERPL-MCNC: 102 MG/DL (ref 65–99)
HBA1C MFR BLD: 6 % (ref 4.8–5.6)
HDLC SERPL-MCNC: 58 MG/DL
LAB CORP EGFR IF AFRICN AM: 109 ML/MIN/1.73
LAB CORP EGFR IF NONAFRICN AM: 94 ML/MIN/1.73
LDLC SERPL CALC-MCNC: 82 MG/DL (ref 0–99)
POTASSIUM SERPL-SCNC: 4.4 MMOL/L (ref 3.5–5.2)
PROT SERPL-MCNC: 6.8 G/DL (ref 6–8.5)
SODIUM SERPL-SCNC: 142 MMOL/L (ref 134–144)
TRIGL SERPL-MCNC: 70 MG/DL (ref 0–149)
VLDLC SERPL CALC-MCNC: 14 MG/DL (ref 5–40)

## 2019-01-30 PROCEDURE — 99213 OFFICE O/P EST LOW 20 MIN: CPT | Performed by: FAMILY MEDICINE

## 2019-01-30 ASSESSMENT — ENCOUNTER SYMPTOMS
DIZZINESS: 0
BRUISES/BLEEDS EASILY: 0
DYSURIA: 0
COUGH: 0
ARTHRALGIAS: 0
NUMBNESS: 0
FATIGUE: 0
HEADACHES: 0
SLEEP DISTURBANCE: 0
CHILLS: 0
COLOR CHANGE: 0
DIARRHEA: 0
CONSTIPATION: 0
HEMATURIA: 0
NAUSEA: 0
ABDOMINAL PAIN: 0
SHORTNESS OF BREATH: 0
BLOOD IN STOOL: 0
FREQUENCY: 0
ADENOPATHY: 0
PALPITATIONS: 0
UNEXPECTED WEIGHT CHANGE: 0

## 2019-01-30 NOTE — PROGRESS NOTES
Subjective      Patient ID: Zeb Jefferson is a 68 y.o. male.    HPI   Medical management of diabetes, hypertension, hyperlipidemia  Pre visit care team luly performed  A report card has been prepared for today's visit    The following have been reviewed and updated as appropriate in this visit:  Allergies  Meds  Problems       Review of Systems   Constitutional: Negative for chills, fatigue and unexpected weight change.   Eyes: Negative for visual disturbance.   Respiratory: Negative for cough and shortness of breath.    Cardiovascular: Negative for chest pain, palpitations and leg swelling.   Gastrointestinal: Negative for abdominal pain, blood in stool, constipation, diarrhea and nausea.   Endocrine: Negative for cold intolerance and heat intolerance.   Genitourinary: Negative for dysuria, frequency, hematuria and urgency.   Musculoskeletal: Negative for arthralgias.   Skin: Negative for color change.   Neurological: Negative for dizziness, numbness and headaches.   Hematological: Negative for adenopathy. Does not bruise/bleed easily.   Psychiatric/Behavioral: Negative for sleep disturbance.       Current Outpatient Prescriptions   Medication Sig Dispense Refill   • aspirin 81 mg enteric coated tablet Take 81 mg by mouth daily.     • metFORMIN (GLUCOPHAGE) 500 mg tablet TAKE 1 TABLET WITH EVENING MEAL 90 tablet 0   • multivitamin (THERAGRAN) tablet take 1 tablet by oral route  every day     • pantoprazole (PROTONIX) 40 mg EC tablet TAKE 1 TABLET EVERY DAY 90 tablet 1   • simvastatin (ZOCOR) 10 mg tablet TAKE 1 TABLET EVERY DAY IN THE EVENING 90 tablet 1     No current facility-administered medications for this visit.      Past Medical History:   Diagnosis Date   • Melanoma (CMS/HCC) (HCC)      No family history on file.  Past Surgical History:   Procedure Laterality Date   • SKIN CANCER DESTRUCTION       Allergies   Allergen Reactions   • Penicillins      Social History     Social History   • Marital  "status:      Spouse name: N/A   • Number of children: N/A   • Years of education: N/A     Occupational History   • Not on file.     Social History Main Topics   • Smoking status: Former Smoker   • Smokeless tobacco: Never Used   • Alcohol use Yes   • Drug use: Unknown   • Sexual activity: Not on file     Other Topics Concern   • Not on file     Social History Narrative   • No narrative on file     Vitals:    01/30/19 0818 01/30/19 0849   BP: 122/70 130/64   BP Location:  Left upper arm   Patient Position:  Sitting   Pulse: 74    Resp: 16    Temp: 36.9 °C (98.5 °F)    SpO2: 95%    Weight: 64 kg (141 lb)    Height: 1.638 m (5' 4.5\")        Objective     Physical Exam   Constitutional: He is oriented to person, place, and time. He appears well-developed and well-nourished.   HENT:   Head: Normocephalic.   Eyes: Pupils are equal, round, and reactive to light.   Fundoscopic exam:       The right eye shows no AV nicking and no exudate.        The left eye shows no AV nicking and no exudate.   Neck: No JVD present. Carotid bruit is not present. No thyromegaly present.   Cardiovascular: Normal rate, regular rhythm, intact distal pulses and normal pulses.    No murmur heard.  Pulmonary/Chest: Effort normal and breath sounds normal.   Abdominal: Soft. Normal appearance, normal aorta and bowel sounds are normal. There is no hepatosplenomegaly. There is no tenderness. There is no CVA tenderness.   Feet:   Right Foot:   Protective Sensation: 10 sites tested. 10 sites sensed.   Left Foot:   Protective Sensation: 10 sites tested. 10 sites sensed.   Lymphadenopathy:     He has no cervical adenopathy.     He has no axillary adenopathy.   Neurological: He is alert and oriented to person, place, and time. No sensory deficit.   Skin: Skin is warm and dry.   Psychiatric: He has a normal mood and affect. His speech is normal and behavior is normal. Cognition and memory are normal.       Assessment/Plan   Problem List Items " Addressed This Visit     Burgos's esophagus without dysplasia     Patient was counselled regarding triggers for symptoms - avoid caffeine/spicey foods/NO smoking  Elevate HOB 30 degrees - DO NOT lie flat for at least 30 minutes after eating  Take medications as directed  Serial upper endoscopy         Mixed hyperlipidemia     GOOD hyperlipidemia controll  CONTINUE CURRENT THERAPY  Recommend low fat diet - Risks and potential complications of hyperlipidemia reviewed  Role of medications,diet, exercise in the treatment of hyperlipidemia discussed   Treatment plan and follow up reviewed and understood by patient         Relevant Orders    Lipid panel (Completed)    Type 2 diabetes mellitus without complication, without long-term current use of insulin (CMS/[facility]) - Primary     Good diabetic control.  Current medications reviewed.  Recommend diet modifications and weight loss.   Met with patient to review diet, exercise, and glucometer readings  Reviewed medications and the importance of compliance  Diabetes health maintenance plan and follow up was discussed and understood by patient         Relevant Orders    Comprehensive metabolic panel (Completed)    Hemoglobin A1c (Completed)          Della Hernandez,   1/31/2019

## 2019-01-31 ENCOUNTER — TELEPHONE (OUTPATIENT)
Dept: FAMILY MEDICINE | Facility: CLINIC | Age: 69
End: 2019-01-31

## 2019-01-31 NOTE — TELEPHONE ENCOUNTER
----- Message from Della Hernandez DO sent at 1/31/2019  7:45 AM EST -----  CALL PT  - labs are fantastic   Blood sugar 102/ A1c 6.0 - cholesterol panel at goal - same meds/diet/exercise

## 2019-03-25 RX ORDER — METFORMIN HYDROCHLORIDE 500 MG/1
TABLET ORAL
Qty: 90 TABLET | Refills: 0 | Status: SHIPPED | OUTPATIENT
Start: 2019-03-25 | End: 2019-09-09 | Stop reason: SDUPTHER

## 2019-03-25 NOTE — TELEPHONE ENCOUNTER
Last filled: 8/27/2018  Last seen: 9/25/2018  Next appt: 5/21/2019  please advise. Susan Agrawal MA

## 2019-04-15 RX ORDER — PANTOPRAZOLE SODIUM 40 MG/1
TABLET, DELAYED RELEASE ORAL
Qty: 90 TABLET | Refills: 1 | Status: SHIPPED | OUTPATIENT
Start: 2019-04-15 | End: 2019-11-11 | Stop reason: SDUPTHER

## 2019-05-21 ENCOUNTER — OFFICE VISIT (OUTPATIENT)
Dept: FAMILY MEDICINE | Facility: CLINIC | Age: 69
End: 2019-05-21
Payer: MEDICARE

## 2019-05-21 VITALS
HEIGHT: 65 IN | BODY MASS INDEX: 23.32 KG/M2 | RESPIRATION RATE: 16 BRPM | TEMPERATURE: 98 F | OXYGEN SATURATION: 97 % | HEART RATE: 74 BPM | DIASTOLIC BLOOD PRESSURE: 60 MMHG | SYSTOLIC BLOOD PRESSURE: 130 MMHG | WEIGHT: 140 LBS

## 2019-05-21 DIAGNOSIS — Z85.820 HX OF MALIGNANT MELANOMA: ICD-10-CM

## 2019-05-21 DIAGNOSIS — E11.9 TYPE 2 DIABETES MELLITUS WITHOUT COMPLICATION, WITHOUT LONG-TERM CURRENT USE OF INSULIN (CMS/HCC): Primary | ICD-10-CM

## 2019-05-21 DIAGNOSIS — E78.2 MIXED HYPERLIPIDEMIA: ICD-10-CM

## 2019-05-21 DIAGNOSIS — K22.70 BARRETT'S ESOPHAGUS WITHOUT DYSPLASIA: ICD-10-CM

## 2019-05-21 DIAGNOSIS — G47.9 SLEEP DISORDER: ICD-10-CM

## 2019-05-21 LAB — HBA1C MFR BLD: 5.9 % (ref 4.8–5.6)

## 2019-05-21 PROCEDURE — 99213 OFFICE O/P EST LOW 20 MIN: CPT | Performed by: FAMILY MEDICINE

## 2019-05-21 ASSESSMENT — ENCOUNTER SYMPTOMS
SLEEP DISTURBANCE: 0
DYSURIA: 0
ABDOMINAL PAIN: 0
NAUSEA: 0
DIARRHEA: 0
BLOOD IN STOOL: 0
COLOR CHANGE: 0
COUGH: 0
ADENOPATHY: 0
ARTHRALGIAS: 0
DIZZINESS: 0
BRUISES/BLEEDS EASILY: 0
SHORTNESS OF BREATH: 0
PALPITATIONS: 0
NUMBNESS: 0
CONSTIPATION: 0
FATIGUE: 0
UNEXPECTED WEIGHT CHANGE: 0
CHILLS: 0
HEMATURIA: 0
HEADACHES: 0
FREQUENCY: 0

## 2019-05-21 NOTE — PROGRESS NOTES
Subjective      Patient ID: Zeb Jefferson is a 68 y.o. male.    HPI     Medical management of diabetes, hypertension, hyperlipidemia  Pre visit care team luly performed  A report card has been prepared for today's visit  Following diet - mod exercise      The following have been reviewed and updated as appropriate in this visit:  Allergies  Meds  Problems       Review of Systems   Constitutional: Negative for chills, fatigue and unexpected weight change.   Eyes: Negative for visual disturbance.   Respiratory: Negative for cough and shortness of breath.    Cardiovascular: Negative for chest pain, palpitations and leg swelling.   Gastrointestinal: Negative for abdominal pain, blood in stool, constipation, diarrhea and nausea.   Endocrine: Negative for cold intolerance and heat intolerance.   Genitourinary: Negative for dysuria, frequency, hematuria and urgency.   Musculoskeletal: Negative for arthralgias.   Skin: Negative for color change.   Neurological: Negative for dizziness, numbness and headaches.   Hematological: Negative for adenopathy. Does not bruise/bleed easily.   Psychiatric/Behavioral: Negative for sleep disturbance.       Current Outpatient Prescriptions   Medication Sig Dispense Refill   • aspirin 81 mg enteric coated tablet Take 81 mg by mouth daily.     • metFORMIN (GLUCOPHAGE) 500 mg tablet TAKE 1 TABLET WITH EVENING MEAL 90 tablet 0   • multivitamin (THERAGRAN) tablet take 1 tablet by oral route  every day     • pantoprazole (PROTONIX) 40 mg EC tablet TAKE 1 TABLET EVERY DAY 90 tablet 1   • simvastatin (ZOCOR) 10 mg tablet TAKE 1 TABLET EVERY DAY IN THE EVENING 90 tablet 1     No current facility-administered medications for this visit.      Past Medical History:   Diagnosis Date   • Melanoma (CMS/HCC) (HCC)      History reviewed. No pertinent family history.  Past Surgical History:   Procedure Laterality Date   • SKIN CANCER DESTRUCTION       Allergies   Allergen Reactions   • Penicillins   "    Social History     Social History   • Marital status:      Spouse name: N/A   • Number of children: N/A   • Years of education: N/A     Occupational History   • Not on file.     Social History Main Topics   • Smoking status: Former Smoker   • Smokeless tobacco: Never Used   • Alcohol use Yes   • Drug use: Unknown   • Sexual activity: Not on file     Other Topics Concern   • Not on file     Social History Narrative   • No narrative on file     Vitals:    05/21/19 0817 05/21/19 0846   BP: 122/70 130/60   BP Location:  Left upper arm   Patient Position:  Sitting   Pulse: 74    Resp: 16    Temp: 36.7 °C (98 °F)    SpO2: 97%    Weight: 63.5 kg (140 lb)    Height: 1.638 m (5' 4.5\")        Objective     Physical Exam   Constitutional: He is oriented to person, place, and time. He appears well-developed and well-nourished.   HENT:   Head: Normocephalic.   Eyes: Pupils are equal, round, and reactive to light.   Fundoscopic exam:       The right eye shows no AV nicking and no exudate.        The left eye shows no AV nicking and no exudate.   Neck: No JVD present. Carotid bruit is not present. No thyromegaly present.   Cardiovascular: Normal rate, regular rhythm, intact distal pulses and normal pulses.    No murmur heard.  Pulmonary/Chest: Effort normal and breath sounds normal.   Abdominal: Soft. Normal appearance, normal aorta and bowel sounds are normal. There is no hepatosplenomegaly. There is no tenderness. There is no CVA tenderness.   Feet:   Right Foot:   Protective Sensation: 10 sites tested. 10 sites sensed.   Left Foot:   Protective Sensation: 10 sites tested. 10 sites sensed.   Lymphadenopathy:     He has no cervical adenopathy.     He has no axillary adenopathy.   Neurological: He is alert and oriented to person, place, and time. No sensory deficit.   Skin: Skin is warm and dry.   Psychiatric: He has a normal mood and affect. His speech is normal and behavior is normal. Cognition and memory are normal. "       Assessment/Plan   Problem List Items Addressed This Visit     Burgos's esophagus without dysplasia     Patient was counselled regarding triggers for symptoms - avoid caffeine/spicey foods/NO smoking  Elevate HOB 30 degrees - DO NOT lie flat for at least 30 minutes after eating  Take medications as directed  Serial upper endoscopy         Hx of malignant melanoma     SHARRON - continue surveillance         Mixed hyperlipidemia     GOOD hyperlipidemia controll  CONTINUE CURRENT THERAPY  Recommend low fat diet - Risks and potential complications of hyperlipidemia reviewed  Role of medications,diet, exercise in the treatment of hyperlipidemia discussed   Treatment plan and follow up reviewed and understood by patient         Sleep disorder     same meds - long term safety meds discussed         Type 2 diabetes mellitus without complication, without long-term current use of insulin (CMS/[facility]) - Primary     Good diabetic control.  Current medications reviewed.  Recommend diet modifications and weight loss.   Met with patient to review diet, exercise, and glucometer readings  Reviewed medications and the importance of compliance  Diabetes health maintenance plan and follow up was discussed and understood by patient         Relevant Orders    Hemoglobin A1c (Completed)    Basic metabolic panel (Completed)    Microalbumin / creatinine urine ratio (Completed)          Della Hernandez DO  5/23/2019

## 2019-05-22 LAB
ALBUMIN/CREAT UR: 5.5 MG/G CREAT (ref 0–30)
BUN SERPL-MCNC: 10 MG/DL (ref 8–27)
BUN/CREAT SERPL: 13 (ref 10–24)
CALCIUM SERPL-MCNC: 9.6 MG/DL (ref 8.6–10.2)
CHLORIDE SERPL-SCNC: 104 MMOL/L (ref 96–106)
CO2 SERPL-SCNC: 27 MMOL/L (ref 20–29)
CREAT SERPL-MCNC: 0.77 MG/DL (ref 0.76–1.27)
CREAT UR-MCNC: 109.4 MG/DL
GLUCOSE SERPL-MCNC: 102 MG/DL (ref 65–99)
LAB CORP EGFR IF AFRICN AM: 107 ML/MIN/1.73
LAB CORP EGFR IF NONAFRICN AM: 93 ML/MIN/1.73
MICROALBUMIN UR-MCNC: 6 UG/ML
POTASSIUM SERPL-SCNC: 4.5 MMOL/L (ref 3.5–5.2)
SODIUM SERPL-SCNC: 142 MMOL/L (ref 134–144)

## 2019-07-29 RX ORDER — SIMVASTATIN 10 MG/1
TABLET, FILM COATED ORAL
Qty: 90 TABLET | Refills: 1 | Status: SHIPPED | OUTPATIENT
Start: 2019-07-29 | End: 2020-04-27

## 2019-07-29 RX ORDER — ZOLPIDEM TARTRATE 10 MG/1
10 TABLET ORAL NIGHTLY
Qty: 90 TABLET | Refills: 0 | Status: SHIPPED | OUTPATIENT
Start: 2019-07-29 | End: 2020-12-09 | Stop reason: SDUPTHER

## 2019-07-29 NOTE — TELEPHONE ENCOUNTER
Medicine Refill Request    Last Office Visit: 5/21/2019  Next Office Visit: 10/10/2019        Current Outpatient Prescriptions:   •  aspirin 81 mg enteric coated tablet, Take 81 mg by mouth daily., Disp: , Rfl:   •  metFORMIN (GLUCOPHAGE) 500 mg tablet, TAKE 1 TABLET WITH EVENING MEAL, Disp: 90 tablet, Rfl: 0  •  multivitamin (THERAGRAN) tablet, take 1 tablet by oral route  every day, Disp: , Rfl:   •  pantoprazole (PROTONIX) 40 mg EC tablet, TAKE 1 TABLET EVERY DAY, Disp: 90 tablet, Rfl: 1  •  simvastatin (ZOCOR) 10 mg tablet, TAKE 1 TABLET EVERY DAY IN THE EVENING, Disp: 90 tablet, Rfl: 1  •  zolpidem (AMBIEN) 10 mg tablet, Take 1 tablet (10 mg total) by mouth nightly., Disp: 90 tablet, Rfl: 0      BP Readings from Last 3 Encounters:   05/21/19 130/60   01/30/19 130/64   09/25/18 118/60       Recent Lab results:  Lab Results   Component Value Date    CHOL 154 01/30/2019   ,   Lab Results   Component Value Date    HDL 58 01/30/2019   ,   Lab Results   Component Value Date    LDLCALC 82 01/30/2019   ,   Lab Results   Component Value Date    TRIG 70 01/30/2019        Lab Results   Component Value Date    GLUCOSE 102 (H) 05/21/2019   ,   Lab Results   Component Value Date    HGBA1C 5.9 (H) 05/21/2019         Lab Results   Component Value Date    CREATININE 0.77 05/21/2019       Lab Results   Component Value Date    TSH 0.998 10/27/2015

## 2019-07-29 NOTE — TELEPHONE ENCOUNTER
Medicine Refill Request  Query Rx  Last Office Visit: 5/21/2019  Next Office Visit: 10/10/2019        Current Outpatient Prescriptions:   •  aspirin 81 mg enteric coated tablet, Take 81 mg by mouth daily., Disp: , Rfl:   •  metFORMIN (GLUCOPHAGE) 500 mg tablet, TAKE 1 TABLET WITH EVENING MEAL, Disp: 90 tablet, Rfl: 0  •  multivitamin (THERAGRAN) tablet, take 1 tablet by oral route  every day, Disp: , Rfl:   •  pantoprazole (PROTONIX) 40 mg EC tablet, TAKE 1 TABLET EVERY DAY, Disp: 90 tablet, Rfl: 1  •  simvastatin (ZOCOR) 10 mg tablet, TAKE 1 TABLET EVERY DAY IN THE EVENING, Disp: 90 tablet, Rfl: 1      BP Readings from Last 3 Encounters:   05/21/19 130/60   01/30/19 130/64   09/25/18 118/60       Recent Lab results:  Lab Results   Component Value Date    CHOL 154 01/30/2019   ,   Lab Results   Component Value Date    HDL 58 01/30/2019   ,   Lab Results   Component Value Date    LDLCALC 82 01/30/2019   ,   Lab Results   Component Value Date    TRIG 70 01/30/2019        Lab Results   Component Value Date    GLUCOSE 102 (H) 05/21/2019   ,   Lab Results   Component Value Date    HGBA1C 5.9 (H) 05/21/2019         Lab Results   Component Value Date    CREATININE 0.77 05/21/2019       Lab Results   Component Value Date    TSH 0.998 10/27/2015

## 2019-09-26 ENCOUNTER — OFFICE VISIT (OUTPATIENT)
Dept: FAMILY MEDICINE | Facility: CLINIC | Age: 69
End: 2019-09-26
Payer: MEDICARE

## 2019-09-26 VITALS
HEART RATE: 56 BPM | TEMPERATURE: 98.4 F | BODY MASS INDEX: 24.11 KG/M2 | HEIGHT: 64 IN | SYSTOLIC BLOOD PRESSURE: 122 MMHG | DIASTOLIC BLOOD PRESSURE: 60 MMHG | OXYGEN SATURATION: 98 % | WEIGHT: 141.2 LBS | RESPIRATION RATE: 16 BRPM

## 2019-09-26 DIAGNOSIS — G56.81 PINCHED NERVE IN SHOULDER, RIGHT: Primary | ICD-10-CM

## 2019-09-26 PROCEDURE — 99213 OFFICE O/P EST LOW 20 MIN: CPT | Performed by: NURSE PRACTITIONER

## 2019-09-26 RX ORDER — CYCLOBENZAPRINE HCL 10 MG
10 TABLET ORAL NIGHTLY PRN
Qty: 20 TABLET | Refills: 0 | Status: SHIPPED | OUTPATIENT
Start: 2019-09-26 | End: 2020-01-20

## 2019-09-26 RX ORDER — NAPROXEN 500 MG/1
500 TABLET ORAL 2 TIMES DAILY WITH MEALS
Qty: 30 TABLET | Refills: 0 | Status: SHIPPED | OUTPATIENT
Start: 2019-09-26 | End: 2022-07-24

## 2019-09-26 ASSESSMENT — ENCOUNTER SYMPTOMS
ADENOPATHY: 0
WEAKNESS: 0
FEVER: 0
FATIGUE: 0
BRUISES/BLEEDS EASILY: 0
COLOR CHANGE: 0
HEADACHES: 0
TREMORS: 0
SPEECH DIFFICULTY: 0
NUMBNESS: 1
UNEXPECTED WEIGHT CHANGE: 0
DIZZINESS: 0
MYALGIAS: 0
FACIAL ASYMMETRY: 0
APPETITE CHANGE: 0
LIGHT-HEADEDNESS: 0
ARTHRALGIAS: 1
SHORTNESS OF BREATH: 0
ACTIVITY CHANGE: 0
COUGH: 0
PALPITATIONS: 0
CHEST TIGHTNESS: 0
TROUBLE SWALLOWING: 0
CONFUSION: 0

## 2019-09-26 NOTE — PATIENT INSTRUCTIONS
Please do not do any strenuous activities. You can do gentle stretching/walking as tolerated. Please use heat or ice (whichever gives you the most relief) for 15 minutes at a time up to 4x/day. Please use topical creams (such as Bengay or IcyHot) as directed. Please use Naproxen twice/day with food and/or Tylenol (Acetominophen) 650 mg every 6 hours as needed for pain.   Please call if not better in about 2-3 weeks, or if pain worsens at any time.

## 2019-09-26 NOTE — PROGRESS NOTES
"Subjective      Patient ID: Zeb Jefferson is a 69 y.o. male.  1950      Pt started with right arm tingling about 2 weeks ago.  Reports that he lifted 50 lb bags on sand possibly 2 weeks ago.  Right shoulder blade area started hurting about 4 days ago.  It got better with heat and Ibuprofen yesterday morning, but the pain returned last night after pt walked around the [place] yesterday. Denies weakness in right arm.  Denies trauma to arm/back.  Denies fever/chills.        The following have been reviewed and updated as appropriate in this visit:  Tobacco  Allergies  Meds  Problems  Med Hx  Surg Hx  Fam Hx  Soc Hx         Past Medical History:   Diagnosis Date   • Melanoma (CMS/HCC) (HCC)      Review of Systems   Constitutional: Negative for activity change, appetite change, fatigue, fever and unexpected weight change.   HENT: Negative for congestion, ear pain, hearing loss and trouble swallowing.    Eyes: Negative for visual disturbance.   Respiratory: Negative for cough, chest tightness and shortness of breath.    Cardiovascular: Negative for chest pain, palpitations and leg swelling.   Musculoskeletal: Positive for arthralgias (right shoulder). Negative for myalgias.   Skin: Negative for color change and rash.   Neurological: Positive for numbness (tingling in right arm). Negative for dizziness, tremors, syncope, facial asymmetry, speech difficulty, weakness, light-headedness and headaches.   Hematological: Negative for adenopathy. Does not bruise/bleed easily.   Psychiatric/Behavioral: Negative for confusion.       Objective     Vitals:    09/26/19 1426   BP: 122/60   BP Location: Right upper arm   Patient Position: Sitting   Pulse: (!) 56   Resp: 16   Temp: 36.9 °C (98.4 °F)   TempSrc: Oral   SpO2: 98%   Weight: 64 kg (141 lb 3.2 oz)   Height: 1.638 m (5' 4.49\")     Body mass index is 23.87 kg/m².    Physical Exam   Constitutional: He is oriented to person, place, and time. He appears " well-developed and well-nourished. No distress.   HENT:   Head: Normocephalic and atraumatic.   Eyes: Conjunctivae are normal. Right eye exhibits no discharge. Left eye exhibits no discharge. No scleral icterus.   Neck: Full passive range of motion without pain.   Cardiovascular: Normal rate, regular rhythm and normal heart sounds.    Pulses:       Radial pulses are 2+ on the right side.   Pulmonary/Chest: Effort normal and breath sounds normal.   Musculoskeletal: Normal range of motion. He exhibits no edema or deformity.        Right shoulder: He exhibits tenderness. He exhibits normal range of motion, no swelling, no effusion, no crepitus, no deformity, no laceration, normal pulse and normal strength.        Arms:  Equal hand grasp b/l   Neurological: He is alert and oriented to person, place, and time. No cranial nerve deficit or sensory deficit. He exhibits normal muscle tone. Coordination normal.   Skin: Skin is warm and dry. No rash noted.   Psychiatric: He has a normal mood and affect. His behavior is normal. Judgment and thought content normal.       Assessment/Plan   Diagnoses and all orders for this visit:    Pinched nerve in shoulder, right (Primary)  -     cyclobenzaprine (FLEXERIL) 10 mg tablet; Take 1 tablet (10 mg total) by mouth nightly as needed for muscle spasms for up to 14 days.  -     naproxen (NAPROSYN) 500 mg tablet; Take 1 tablet (500 mg total) by mouth 2 (two) times a day with meals.  Suspect this is pinched nerve near rhomboid from lifting bags of sand.  No strength deficits.  No red flags on exam.  Will treat with anti-inflammatory and muscle relaxant.  Advised pt NOT to take Ambien at same time as Flexeril.  SE discussed.      Follow up PRN.  Call if symptoms change/worsen before appt with Dr. Kasper on 10/10/19.

## 2019-10-10 ENCOUNTER — OFFICE VISIT (OUTPATIENT)
Dept: FAMILY MEDICINE | Facility: CLINIC | Age: 69
End: 2019-10-10
Payer: MEDICARE

## 2019-10-10 VITALS
HEIGHT: 65 IN | OXYGEN SATURATION: 93 % | WEIGHT: 139 LBS | BODY MASS INDEX: 23.16 KG/M2 | SYSTOLIC BLOOD PRESSURE: 132 MMHG | DIASTOLIC BLOOD PRESSURE: 60 MMHG | HEART RATE: 96 BPM | RESPIRATION RATE: 16 BRPM | TEMPERATURE: 98 F

## 2019-10-10 DIAGNOSIS — E11.9 TYPE 2 DIABETES MELLITUS WITHOUT COMPLICATION, WITHOUT LONG-TERM CURRENT USE OF INSULIN (CMS/HCC): ICD-10-CM

## 2019-10-10 DIAGNOSIS — Z23 NEED FOR INFLUENZA VACCINATION: ICD-10-CM

## 2019-10-10 DIAGNOSIS — E78.2 MIXED HYPERLIPIDEMIA: ICD-10-CM

## 2019-10-10 DIAGNOSIS — Z85.820 HX OF MALIGNANT MELANOMA: ICD-10-CM

## 2019-10-10 DIAGNOSIS — G47.9 SLEEP DISORDER: ICD-10-CM

## 2019-10-10 DIAGNOSIS — R35.1 NOCTURIA: ICD-10-CM

## 2019-10-10 DIAGNOSIS — Z00.00 MEDICARE ANNUAL WELLNESS VISIT, SUBSEQUENT: Primary | ICD-10-CM

## 2019-10-10 DIAGNOSIS — M54.12 CERVICAL RADICULOPATHY: ICD-10-CM

## 2019-10-10 DIAGNOSIS — K22.70 BARRETT'S ESOPHAGUS WITHOUT DYSPLASIA: ICD-10-CM

## 2019-10-10 LAB
ALBUMIN SERPL-MCNC: 4.6 G/DL (ref 3.5–4.8)
ALBUMIN/GLOB SERPL: 2.1 {RATIO} (ref 1.2–2.2)
ALP SERPL-CCNC: 56 IU/L (ref 39–117)
ALT SERPL-CCNC: 18 IU/L (ref 0–44)
AST SERPL-CCNC: 17 IU/L (ref 0–40)
BASOPHILS # BLD AUTO: 0.1 X10E3/UL (ref 0–0.2)
BASOPHILS NFR BLD AUTO: 1 %
BILIRUB SERPL-MCNC: 0.5 MG/DL (ref 0–1.2)
BUN SERPL-MCNC: 9 MG/DL (ref 8–27)
BUN/CREAT SERPL: 12 (ref 10–24)
CALCIUM SERPL-MCNC: 10.3 MG/DL (ref 8.6–10.2)
CHLORIDE SERPL-SCNC: 102 MMOL/L (ref 96–106)
CHOLEST SERPL-MCNC: 142 MG/DL (ref 100–199)
CO2 SERPL-SCNC: 29 MMOL/L (ref 20–29)
CREAT SERPL-MCNC: 0.78 MG/DL (ref 0.76–1.27)
EOSINOPHIL # BLD AUTO: 0.2 X10E3/UL (ref 0–0.4)
EOSINOPHIL NFR BLD AUTO: 2 %
ERYTHROCYTE [DISTWIDTH] IN BLOOD BY AUTOMATED COUNT: 14.7 % (ref 12.3–15.4)
GLOBULIN SER CALC-MCNC: 2.2 G/DL (ref 1.5–4.5)
GLUCOSE SERPL-MCNC: 98 MG/DL (ref 65–99)
HBA1C MFR BLD: 5.9 % (ref 4.8–5.6)
HCT VFR BLD AUTO: 43.6 % (ref 37.5–51)
HDLC SERPL-MCNC: 56 MG/DL
HGB BLD-MCNC: 14.3 G/DL (ref 13–17.7)
IMM GRANULOCYTES # BLD AUTO: 0 X10E3/UL (ref 0–0.1)
IMM GRANULOCYTES NFR BLD AUTO: 0 %
LAB CORP EGFR IF AFRICN AM: 106 ML/MIN/1.73
LAB CORP EGFR IF NONAFRICN AM: 91 ML/MIN/1.73
LDLC SERPL CALC-MCNC: 73 MG/DL (ref 0–99)
LYMPHOCYTES # BLD AUTO: 2.8 X10E3/UL (ref 0.7–3.1)
LYMPHOCYTES NFR BLD AUTO: 33 %
MCH RBC QN AUTO: 29.9 PG (ref 26.6–33)
MCHC RBC AUTO-ENTMCNC: 32.8 G/DL (ref 31.5–35.7)
MCV RBC AUTO: 91 FL (ref 79–97)
MONOCYTES # BLD AUTO: 0.6 X10E3/UL (ref 0.1–0.9)
MONOCYTES NFR BLD AUTO: 7 %
NEUTROPHILS # BLD AUTO: 5 X10E3/UL (ref 1.4–7)
NEUTROPHILS NFR BLD AUTO: 57 %
PLATELET # BLD AUTO: 339 X10E3/UL (ref 150–450)
POTASSIUM SERPL-SCNC: 5.3 MMOL/L (ref 3.5–5.2)
PROT SERPL-MCNC: 6.8 G/DL (ref 6–8.5)
RBC # BLD AUTO: 4.78 X10E6/UL (ref 4.14–5.8)
SODIUM SERPL-SCNC: 143 MMOL/L (ref 134–144)
TRIGL SERPL-MCNC: 63 MG/DL (ref 0–149)
VLDLC SERPL CALC-MCNC: 13 MG/DL (ref 5–40)
WBC # BLD AUTO: 8.7 X10E3/UL (ref 3.4–10.8)

## 2019-10-10 PROCEDURE — 90653 IIV ADJUVANT VACCINE IM: CPT | Performed by: FAMILY MEDICINE

## 2019-10-10 PROCEDURE — G0008 ADMIN INFLUENZA VIRUS VAC: HCPCS | Performed by: FAMILY MEDICINE

## 2019-10-10 PROCEDURE — G0439 PPPS, SUBSEQ VISIT: HCPCS | Performed by: FAMILY MEDICINE

## 2019-10-10 PROCEDURE — 99214 OFFICE O/P EST MOD 30 MIN: CPT | Mod: 25 | Performed by: FAMILY MEDICINE

## 2019-10-10 ASSESSMENT — ENCOUNTER SYMPTOMS
BLOOD IN STOOL: 0
NECK PAIN: 1
NAUSEA: 0
PALPITATIONS: 1
DIZZINESS: 0
CHILLS: 0
PHOTOPHOBIA: 0
SINUS PAIN: 0
HEADACHES: 0
FREQUENCY: 0
ADENOPATHY: 0
DYSURIA: 0
UNEXPECTED WEIGHT CHANGE: 0
DIARRHEA: 0
COLOR CHANGE: 0
SINUS PRESSURE: 0
BRUISES/BLEEDS EASILY: 0
VOICE CHANGE: 0
CHEST TIGHTNESS: 0
SLEEP DISTURBANCE: 1
CONSTIPATION: 0
FATIGUE: 1
SHORTNESS OF BREATH: 0
HEMATURIA: 0
SORE THROAT: 0
COUGH: 0
ARTHRALGIAS: 0
ABDOMINAL PAIN: 0
NUMBNESS: 0

## 2019-10-10 ASSESSMENT — MINI COG
TOTAL SCORE: 5
COMPLETED: YES

## 2019-10-10 NOTE — PATIENT INSTRUCTIONS
Patient Education     Fall Prevention in the Home, Adult  Falls can cause injuries. They can happen to people of all ages. There are many things you can do to make your home safe and to help prevent falls. Ask for help when making these changes, if needed.  What actions can I take to prevent falls?  General Instructions  · Use good lighting in all rooms. Replace any light bulbs that burn out.  · Turn on the lights when you go into a dark area. Use night-lights.  · Keep items that you use often in easy-to-reach places. Lower the shelves around your home if necessary.  · Set up your furniture so you have a clear path. Avoid moving your furniture around.  · Do not have throw rugs and other things on the floor that can make you trip.  · Avoid walking on wet floors.  · If any of your floors are uneven, fix them.  · Add color or contrast paint or tape to clearly leonarda and help you see:  ? Any grab bars or handrails.  ? First and last steps of stairways.  ? Where the edge of each step is.  · If you use a stepladder:  ? Make sure that it is fully opened. Do not climb a closed stepladder.  ? Make sure that both sides of the stepladder are locked into place.  ? Ask someone to hold the stepladder for you while you use it.  · If there are any pets around you, be aware of where they are.  What can I do in the bathroom?    · Keep the floor dry. Clean up any water that spills onto the floor as soon as it happens.  · Remove soap buildup in the tub or shower regularly.  · Use non-skid mats or decals on the floor of the tub or shower.  · Attach bath mats securely with double-sided, non-slip rug tape.  · If you need to sit down in the shower, use a plastic, non-slip stool.  · Install grab bars by the toilet and in the tub and shower. Do not use towel bars as grab bars.  What can I do in the bedroom?  · Make sure that you have a light by your bed that is easy to reach.  · Do not use any sheets or blankets that are too big for your bed.  They should not hang down onto the floor.  · Have a firm chair that has side arms. You can use this for support while you get dressed.  What can I do in the kitchen?  · Clean up any spills right away.  · If you need to reach something above you, use a strong step stool that has a grab bar.  · Keep electrical cords out of the way.  · Do not use floor polish or wax that makes floors slippery. If you must use wax, use non-skid floor wax.  What can I do with my stairs?  · Do not leave any items on the stairs.  · Make sure that you have a light switch at the top of the stairs and the bottom of the stairs. If you do not have them, ask someone to add them for you.  · Make sure that there are handrails on both sides of the stairs, and use them. Fix handrails that are broken or loose. Make sure that handrails are as long as the stairways.  · Install non-slip stair treads on all stairs in your home.  · Avoid having throw rugs at the top or bottom of the stairs. If you do have throw rugs, attach them to the floor with carpet tape.  · Choose a carpet that does not hide the edge of the steps on the stairway.  · Check any carpeting to make sure that it is firmly attached to the stairs. Fix any carpet that is loose or worn.  What can I do on the outside of my home?  · Use bright outdoor lighting.  · Regularly fix the edges of walkways and driveways and fix any cracks.  · Remove anything that might make you trip as you walk through a door, such as a raised step or threshold.  · Trim any bushes or trees on the path to your home.  · Regularly check to see if handrails are loose or broken. Make sure that both sides of any steps have handrails.  · Install guardrails along the edges of any raised decks and porches.  · Clear walking paths of anything that might make someone trip, such as tools or rocks.  · Have any leaves, snow, or ice cleared regularly.  · Use sand or salt on walking paths during winter.  · Clean up any spills in your  garage right away. This includes grease or oil spills.  What other actions can I take?  · Wear shoes that:  ? Have a low heel. Do not wear high heels.  ? Have rubber bottoms.  ? Are comfortable and fit you well.  ? Are closed at the toe. Do not wear open-toe sandals.  · Use tools that help you move around (mobility aids) if they are needed. These include:  ? Canes.  ? Walkers.  ? Scooters.  ? Crutches.  · Review your medicines with your doctor. Some medicines can make you feel dizzy. This can increase your chance of falling.  Ask your doctor what other things you can do to help prevent falls.  Where to find more information  · Centers for Disease Control and Prevention, STEADI: https://cdc.gov  · [employer] [place] [employer] [place]: https://gs0nrbz.debbie.nih.gov  Contact a doctor if:  · You are afraid of falling at home.  · You feel weak, drowsy, or dizzy at home.  · You fall at home.  Summary  · There are many simple things that you can do to make your home safe and to help prevent falls.  · Ways to make your home safe include removing tripping hazards and installing grab bars in the bathroom.  · Ask for help when making these changes in your home.  This information is not intended to replace advice given to you by your health care provider. Make sure you discuss any questions you have with your health care provider.  Document Released: 10/14/2010 Document Revised: 08/02/2018 Document Reviewed: 08/02/2018  [employer] Patient Education © 2019 [employer]                              Your Personalized Prevention Plan Services (PPPS)    Preventive Services Checklist (Assumes Average Risk Unless Otherwise Noted):    Health Maintenance Topics with due status: Overdue       Topic Date Due    Annual Dilated Retinal Exam 08/31/1959    DTaP, Tdap, and Td Vaccines 08/31/1968    Zoster Vaccine 08/31/1999    Annual Falls Risk Screening 08/31/2014    Influenza Vaccine 08/01/2019    Medicare Annual Wellness Visit 09/25/2019      Health Maintenance Topics with due status: Not Due       Topic Last Completion Date    Colonoscopy 04/23/2013    Hemoglobin A1C 05/21/2019    Urine Protein Screening 05/21/2019    Diabetic Foot Exam 05/23/2019     Health Maintenance Topics with due status: Completed / Addressed / Aged Out       Topic Last Completion Date    Hepatitis C Screening 08/24/2016    Pneumococcal 12/22/2016    Pneumococcal (65 years and older) 12/22/2016    Meningococcal ACWY Aged Out    Varicella Vaccines Aged Out    HIB Vaccines Aged Out    IPV Vaccines Aged Out    HPV Vaccines Aged Out       You May Be Eligible for These Additional Preventive Services   (Assumes Average Risk Unless Otherwise Noted)  Diabetes Screening Any 1 risk factor: hypertension, dyslipidemia, obesity, high glucose; or Any 2 risk factors: >=64yo, overweight, family history diabetes (covered every 6 months)   Hepatitis C Screening Any 1 risk factor: 1) blood transfusion before 1992,   2) current or past injection drug use (annually for high risk; if born between 6401-1691, see above for status).   Vaccine: Hepatitis B As necessary if at-risk: hemophilia, ESRD, diabetes, living with individual infected with hep B, healthcare worker with frequent contact with blood/bodily fluids (series covered once)   Sexually Transmitted Diseases (STDs) As necessary chlamydia, gonorrhea, syphilis, hepatitis B (covered annually)  HIV if any 1 risk factor present: 1) <14yo or >64yo and at increased risk or 2) 15-64yo and ask for it (covered annually)   Lung Cancer Screening Low dose chest CT if all 3 risk factors: 1) 55-78yo, 2) smoker or quit within last 15y, 3) >=30 pack years (covered annually).  No results found for this or any previous visit.     Cholesterol Screening No signs or symptoms of cardiovascular disease (screening covered every 5 years).     Prostate Cancer Screening >= 49yo if patient desires test after weighting potential harms and benefits (covered annually)      Abdominal Aortic Aneurysm Screening Any 1 risk factor: 1) family history of AAA or 2) 65-76yo and smoked 100+ cigarettes in a lifetime (covered once).   No results found for this or any previous visit. No results found for this or any previous visit.     Glaucoma Screening Any 1 risk factor: 1) diabetes, 2) family history glaucoma, 3)  >=51yo, 4)  American >=66yo (covered annually)           Health Risk Factors with Personalized Education:  ----------------------------------------------------------------------------------------------------------------------  Controlling Your Diabetes  · Stress can raise your blood sugar.  Learn what causes your stress.  Learn to lower your stress by spending time with family and friends, exercising, deep breathing, trying meditation or yoga, enjoying hobbies and getting enough rest.  Let your PCP know if you’re having problems limiting your stress.  · Maintain a healthy weight by balancing your diet and exercise.  · Choose foods that are lower in calories, saturated fat, trans fat, sugar and salt.  Eat foods with more fiber, like whole grain cereals, bread, crackers, rice or pasta.  Choose to eat fruit, vegetables, and low-fat or fat-free/skim dairy.  · Reduce the portion size of your meals.  Make half of your meal vegetables and fruit, and divide the other half between lean protein and whole grains.  · Drink water instead of juice and regular soda.  · Spend at least some time being active on most days of the week.  · Work to increase your muscle strength at least twice per week.  Try things like light weights, stretch bands, yoga, heavy gardening (digging, planting with tools) or push-ups.  · If you have been prescribed medication, take it regularly and exactly as prescribed.  Let your PCP know if you have any problems or questions about your medication.  · If you have been asked to check your blood sugar, write down your readings and share them with  your PCP  ----------------------------------------------------------------------------------------------------------------------  Controlling Your Cholesterol  · Reduce the amount of saturated and trans fat in your diet.  Limit intake of red meat.  Consume only low-fat or non-fat/skim dairy.  Limit fried food.  Cook with vegetable oils.  · Reduce your intake of sugary foods, sugary drinks and alcohol.  · Eat a diet high in fruit, vegetables and whole grains.  · Get protein from fish, poultry and a small portion of nuts.  · Stay active.  Try to get at least 90 to 150 minutes of exercise per week.  Try brisk walking, swimming, bicycling or dancing.  · Maintain a healthy weight by balancing your diet and exercise.  · If you have been prescribed medication, take it regularly and exactly as prescribed. Let your PCP know if you have any problems or questions about your medication.  · It’s important to know your cholesterol numbers.  When recommended by your PCP, get the cholesterol blood test.  ----------------------------------------------------------------------------------------------------------------------  Reducing Your Risk of Falls  · Tell your PCP if any of your medications make you feel tired, dizzy, lightheaded or off-balance.  · Maintain coordination, flexibility and balance by ensuring regular physical activity.  · Limit alcohol intake to 1 drink per day.  Consider avoiding all alcohol intake.  · Ensure good vision.  Visit an ophthalmologist or optometrist regularly for vision screening or to make sure your glasses / contact lens prescription is correct.  If you need glasses or contacts, wear them.  When you get new glasses or contacts, take time to get used to them.  Do not wear sunglasses or tinted lenses when indoors.  · Ensure good hearing.  Have your hearing checked if you are having trouble hearing, or family and friends think you cannot hear them.  If you need a hearing aid, be sure it fits well and  wear it.  · Get enough rest.  Ensure about 7-9 hours of sleep every day.  · Get up slowly from your bed or chairs.  Do not start walking until you are sure you feel steady.  · Wear non-skid, rubber-soled, low-heeled shoes.  Do not walk in socks, or in shoes and slippers with smooth soles.  · If your PCP or therapist recommends using a cane or walker, use it regularly.  · Make your home safer.  Increase lighting throughout the house, especially at the top and bottom of stairs.  Ensure lighting is easily turned on when getting up in the middle of the night.  Make sure there are two secure rails on all stairs.  Install grab bars in the bathtub / shower and near the toilet.  Consider using a shower chair and / or a hand-held shower.  · Spread sand or salt on icy surfaces.  Beware of wet surfaces, which can be icy.  · Tell your PCP if you have fallen.    Patient was given routine advice on diet, exercise, and weight reduction.  Recommend a repeat PE in 1 year  The pros and cons on PSA testing were discussed and he has chosen to have a PSA done  Discussed colorectal screening - colonoscopy current    TAKE MUSCLE RELAXANT AT BEDTIME  MOIST HEAT

## 2019-10-10 NOTE — PROGRESS NOTES
Subjective     Zeb Jefferson is a 69 y.o. male who presents for a subsequent annual wellness visit.     Comprehensive Medical and Social History  Patient Active Problem List   Diagnosis   • Burgos's esophagus without dysplasia   • Hx of malignant melanoma   • Mixed hyperlipidemia   • Sleep disorder   • Type 2 diabetes mellitus without complication, without long-term current use of insulin (CMS/HCC)   • Nocturia     Past Medical History:   Diagnosis Date   • Melanoma (CMS/HCC)      Past Surgical History:   Procedure Laterality Date   • SKIN CANCER DESTRUCTION       Allergies   Allergen Reactions   • Penicillins      Current Outpatient Prescriptions   Medication Sig Dispense Refill   • aspirin 81 mg enteric coated tablet Take 81 mg by mouth daily.     • cyclobenzaprine (FLEXERIL) 10 mg tablet Take 1 tablet (10 mg total) by mouth nightly as needed for muscle spasms for up to 14 days. 20 tablet 0   • metFORMIN (GLUCOPHAGE) 500 mg tablet TAKE 1 TABLET WITH EVENING MEAL 90 tablet 1   • multivitamin (THERAGRAN) tablet take 1 tablet by oral route  every day     • naproxen (NAPROSYN) 500 mg tablet Take 1 tablet (500 mg total) by mouth 2 (two) times a day with meals. 30 tablet 0   • pantoprazole (PROTONIX) 40 mg EC tablet TAKE 1 TABLET EVERY DAY 90 tablet 1   • simvastatin (ZOCOR) 10 mg tablet TAKE 1 TABLET EVERY DAY IN THE EVENING 90 tablet 1   • zolpidem (AMBIEN) 10 mg tablet Take 1 tablet (10 mg total) by mouth nightly. 90 tablet 0     No current facility-administered medications for this visit.      Social History     Social History   • Marital status:      Spouse name: N/A   • Number of children: N/A   • Years of education: N/A     Social History Main Topics   • Smoking status: Former Smoker   • Smokeless tobacco: Never Used   • Alcohol use Yes   • Drug use: No   • Sexual activity: Defer     Other Topics Concern   • None     Social History Narrative   • None       Objective   Vitals  Vitals:     "10/10/19 0844   BP: 118/70   Pulse: 96   Resp: 16   Temp: 36.7 °C (98 °F)   SpO2: 93%   Weight: 63 kg (139 lb)   Height: 1.638 m (5' 4.5\")     Body mass index is 23.49 kg/m².    Advanced Care Plan  Does patient have advance directive?: Yes       Patient has Advance Directive: Patient has Advance Directive, has not brought in                             PHQ  Over the Past 2 Weeks, Have You Felt Down, Depressed or Hopeless?: no  Over the Past 2 Weeks, Have You Felt Little Interest or Pleasure In Doing Things?: no                                          Mini Cog  Completed: Yes  Score: 5  Result: Negative    Get Up and Go  Result: Pass            STEADI Falls Risk  One or more falls in the last year: No                                                   Needs to push with hands when rising from a chair: Yes         Hearing and Vision Screening  No exam data present  See HRA for relevant hearing screening response.      Assessment/Plan   Diagnoses and all orders for this visit:    Need for influenza vaccination (Primary)  -     Influenza vaccine 65 and older IM preservative free (FluAd)            See Patient Instructions (the written plan) which was given to the patient for PPPS and health risk factors with interventions.   "

## 2019-10-10 NOTE — PROGRESS NOTES
Subjective      Patient ID: Zeb Jefferson is a 69 y.o. male.    HPI    Generally well  Following diet - walking for exercise  Concerns : pinched nerve with right upper back pain - meds helping them stopped - sx returned    The following have been reviewed and updated as appropriate in this visit:       Review of Systems   Constitutional: Positive for fatigue. Negative for chills and unexpected weight change.   HENT: Negative for dental problem, hearing loss, sinus pain, sinus pressure, sore throat and voice change.    Eyes: Negative for photophobia and visual disturbance (exam current).   Respiratory: Negative for cough, chest tightness and shortness of breath.    Cardiovascular: Positive for palpitations (fleeting - no alarm signs). Negative for chest pain and leg swelling.   Gastrointestinal: Negative for abdominal pain, blood in stool, constipation, diarrhea and nausea.   Endocrine: Negative for cold intolerance and heat intolerance.   Genitourinary: Negative for dysuria, frequency, hematuria and urgency.        2x nocturia   Musculoskeletal: Positive for neck pain. Negative for arthralgias.   Skin: Negative for color change and rash.        Derm appt 11/19   Neurological: Negative for dizziness, numbness and headaches.   Hematological: Negative for adenopathy. Does not bruise/bleed easily.   Psychiatric/Behavioral: Positive for sleep disturbance (awakens freq).       Current Outpatient Prescriptions   Medication Sig Dispense Refill   • aspirin 81 mg enteric coated tablet Take 81 mg by mouth daily.     • cyclobenzaprine (FLEXERIL) 10 mg tablet Take 1 tablet (10 mg total) by mouth nightly as needed for muscle spasms for up to 14 days. 20 tablet 0   • metFORMIN (GLUCOPHAGE) 500 mg tablet TAKE 1 TABLET WITH EVENING MEAL 90 tablet 1   • multivitamin (THERAGRAN) tablet take 1 tablet by oral route  every day     • naproxen (NAPROSYN) 500 mg tablet Take 1 tablet (500 mg total) by mouth 2 (two) times a day with meals.  "30 tablet 0   • pantoprazole (PROTONIX) 40 mg EC tablet TAKE 1 TABLET EVERY DAY 90 tablet 1   • simvastatin (ZOCOR) 10 mg tablet TAKE 1 TABLET EVERY DAY IN THE EVENING 90 tablet 1   • zolpidem (AMBIEN) 10 mg tablet Take 1 tablet (10 mg total) by mouth nightly. 90 tablet 0     No current facility-administered medications for this visit.      Past Medical History:   Diagnosis Date   • Melanoma (CMS/HCC)      History reviewed. No pertinent family history.  Past Surgical History:   Procedure Laterality Date   • SKIN CANCER DESTRUCTION       Allergies   Allergen Reactions   • Penicillins      Social History     Social History   • Marital status:      Spouse name: N/A   • Number of children: N/A   • Years of education: N/A     Occupational History   • Not on file.     Social History Main Topics   • Smoking status: Former Smoker   • Smokeless tobacco: Never Used   • Alcohol use Yes   • Drug use: No   • Sexual activity: Defer     Other Topics Concern   • Not on file     Social History Narrative   • No narrative on file     Vitals:    10/10/19 0844 10/10/19 0912   BP: 118/70 132/60   Pulse: 96    Resp: 16    Temp: 36.7 °C (98 °F)    SpO2: 93%    Weight: 63 kg (139 lb)    Height: 1.638 m (5' 4.5\")        Objective     Physical Exam   Constitutional: He is oriented to person, place, and time. He appears well-developed and well-nourished.   HENT:   Head: Normocephalic.   Right Ear: Tympanic membrane and external ear normal.   Left Ear: Tympanic membrane and external ear normal.   Nose: Nose normal.   Mouth/Throat: Uvula is midline and oropharynx is clear and moist. No oropharyngeal exudate.   Eyes: Pupils are equal, round, and reactive to light. Conjunctivae and EOM are normal.   Fundoscopic exam:       The right eye shows no AV nicking.        The left eye shows no AV nicking.   Neck: Carotid bruit is not present. No thyromegaly present.   Cardiovascular: Normal rate, regular rhythm, normal heart sounds, intact distal " pulses and normal pulses.    No murmur heard.  Pulmonary/Chest: Effort normal and breath sounds normal.   Abdominal: Soft. Normal appearance, normal aorta and bowel sounds are normal. There is no hepatosplenomegaly. There is no tenderness. There is no CVA tenderness. Hernia confirmed negative in the right inguinal area and confirmed negative in the left inguinal area.   Genitourinary: Rectum normal. Prostate is enlarged (+1 / no nodules).   Musculoskeletal:   Decreased ROM c spine rotation   tender upper trapezious right   Feet:   Right Foot:   Protective Sensation: 10 sites tested. 10 sites sensed.   Left Foot:   Protective Sensation: 10 sites tested. 10 sites sensed.   Lymphadenopathy:     He has no cervical adenopathy.     He has no axillary adenopathy.   Neurological: He is alert and oriented to person, place, and time. No sensory deficit.   Skin: Skin is warm and dry.   Psychiatric: He has a normal mood and affect. His speech is normal and behavior is normal. Cognition and memory are normal.       Assessment/Plan   Problem List Items Addressed This Visit     Burgos's esophagus without dysplasia     Patient was counselled regarding triggers for symptoms - avoid caffeine/spicey foods/NO smoking  Elevate HOB 30 degrees - DO NOT lie flat for at least 30 minutes after eating  Take medications as directed  Serial upper endoscopy         Hx of malignant melanoma     Regular skin screens - follow a [employer]         Mixed hyperlipidemia     GOOD hyperlipidemia controll  CONTINUE CURRENT THERAPY  Recommend low fat diet - Risks and potential complications of hyperlipidemia reviewed  Role of medications,diet, exercise in the treatment of hyperlipidemia discussed   Treatment plan and follow up reviewed and understood by patient         Relevant Orders    Comprehensive metabolic panel (Completed)    Lipid panel (Completed)    Sleep disorder     same meds - long term safety meds discussed         Type 2 diabetes mellitus  without complication, without long-term current use of insulin (CMS/[facility])     Good diabetic control.  Current medications reviewed.  Recommend diet modifications and weight loss.   Met with patient to review diet, exercise, and glucometer readings  Reviewed medications and the importance of compliance  Diabetes health maintenance plan and follow up was discussed and understood by patient         Relevant Orders    CBC and Differential (Completed)    Comprehensive metabolic panel (Completed)    Hemoglobin A1c (Completed)    Nocturia     Prostate cancer screening discussed  - check PSA         Relevant Orders    PSA (Completed)      Other Visit Diagnoses     Medicare annual wellness visit, subsequent    -  Primary    Cervical radiculopathy        HS muscle relaxant - moist heat    Need for influenza vaccination        Relevant Orders    Influenza vaccine 65 and older IM preservative free (FluAd) (Completed)          Della Hernandez DO  10/12/2019

## 2019-10-11 LAB — PSA SERPL-MCNC: 0.8 NG/ML (ref 0–4)

## 2019-11-11 RX ORDER — PANTOPRAZOLE SODIUM 40 MG/1
TABLET, DELAYED RELEASE ORAL
Qty: 90 TABLET | Refills: 1 | Status: SHIPPED | OUTPATIENT
Start: 2019-11-11 | End: 2020-06-08

## 2019-11-11 NOTE — TELEPHONE ENCOUNTER
Medicine Refill Request    Last Office Visit: 10/10/2019  Next Office Visit: 4/14/2020        Current Outpatient Medications:   •  aspirin 81 mg enteric coated tablet, Take 81 mg by mouth daily., Disp: , Rfl:   •  cyclobenzaprine (FLEXERIL) 10 mg tablet, Take 1 tablet (10 mg total) by mouth nightly as needed for muscle spasms for up to 14 days., Disp: 20 tablet, Rfl: 0  •  metFORMIN (GLUCOPHAGE) 500 mg tablet, TAKE 1 TABLET WITH EVENING MEAL, Disp: 90 tablet, Rfl: 1  •  multivitamin (THERAGRAN) tablet, take 1 tablet by oral route  every day, Disp: , Rfl:   •  naproxen (NAPROSYN) 500 mg tablet, Take 1 tablet (500 mg total) by mouth 2 (two) times a day with meals., Disp: 30 tablet, Rfl: 0  •  pantoprazole (PROTONIX) 40 mg EC tablet, TAKE 1 TABLET EVERY DAY, Disp: 90 tablet, Rfl: 1  •  simvastatin (ZOCOR) 10 mg tablet, TAKE 1 TABLET EVERY DAY IN THE EVENING, Disp: 90 tablet, Rfl: 1  •  zolpidem (AMBIEN) 10 mg tablet, Take 1 tablet (10 mg total) by mouth nightly., Disp: 90 tablet, Rfl: 0      BP Readings from Last 3 Encounters:   10/10/19 132/60   09/26/19 122/60   05/21/19 130/60       Recent Lab results:  Lab Results   Component Value Date    CHOL 142 10/10/2019   ,   Lab Results   Component Value Date    HDL 56 10/10/2019   ,   Lab Results   Component Value Date    LDLCALC 73 10/10/2019   ,   Lab Results   Component Value Date    TRIG 63 10/10/2019        Lab Results   Component Value Date    GLUCOSE 98 10/10/2019   ,   Lab Results   Component Value Date    HGBA1C 5.9 (H) 10/10/2019         Lab Results   Component Value Date    CREATININE 0.78 10/10/2019       Lab Results   Component Value Date    TSH 0.998 10/27/2015

## 2020-01-20 ENCOUNTER — OFFICE VISIT (OUTPATIENT)
Dept: FAMILY MEDICINE | Facility: CLINIC | Age: 70
End: 2020-01-20
Payer: MEDICARE

## 2020-01-20 VITALS
BODY MASS INDEX: 23.66 KG/M2 | HEART RATE: 65 BPM | SYSTOLIC BLOOD PRESSURE: 122 MMHG | OXYGEN SATURATION: 98 % | TEMPERATURE: 98.1 F | WEIGHT: 140 LBS | DIASTOLIC BLOOD PRESSURE: 80 MMHG | RESPIRATION RATE: 16 BRPM

## 2020-01-20 DIAGNOSIS — J01.00 ACUTE NON-RECURRENT MAXILLARY SINUSITIS: ICD-10-CM

## 2020-01-20 DIAGNOSIS — E11.9 TYPE 2 DIABETES MELLITUS WITHOUT COMPLICATION, WITHOUT LONG-TERM CURRENT USE OF INSULIN (CMS/HCC): ICD-10-CM

## 2020-01-20 PROCEDURE — 99213 OFFICE O/P EST LOW 20 MIN: CPT | Performed by: FAMILY MEDICINE

## 2020-01-20 RX ORDER — AZITHROMYCIN 250 MG/1
TABLET, FILM COATED ORAL
Qty: 6 TABLET | Refills: 0 | Status: SHIPPED | OUTPATIENT
Start: 2020-01-20 | End: 2020-01-25

## 2020-01-20 ASSESSMENT — ENCOUNTER SYMPTOMS
RHINORRHEA: 1
FATIGUE: 1
TROUBLE SWALLOWING: 0
HEADACHES: 1
PALPITATIONS: 0
SORE THROAT: 0
VOICE CHANGE: 0
SHORTNESS OF BREATH: 0
CHILLS: 0
FEVER: 0
COUGH: 0
SINUS PRESSURE: 0

## 2020-01-20 NOTE — PATIENT INSTRUCTIONS
Take medications as directed until complete - potential side effects discussed  Contagiousness discussed  Fever control - Fluids  Follow up if not improved    MUCINEX D

## 2020-01-20 NOTE — PROGRESS NOTES
Subjective      Patient ID: Zeb Jefferson is a 69 y.o. male.    HPI    Nasal congestion x 3 week   PND -      ? Colored   Ill contacts at [facility]  Failing OTC RX    The following have been reviewed and updated as appropriate in this visit:  Allergies  Meds  Problems       Review of Systems   Constitutional: Positive for fatigue. Negative for chills and fever.   HENT: Positive for postnasal drip and rhinorrhea. Negative for ear pain, sinus pressure, sneezing, sore throat, trouble swallowing and voice change.    Respiratory: Negative for cough and shortness of breath.    Cardiovascular: Negative for chest pain and palpitations.   Skin: Negative for rash.   Neurological: Positive for headaches.       Current Outpatient Medications   Medication Sig Dispense Refill   • aspirin 81 mg enteric coated tablet Take 81 mg by mouth daily.     • metFORMIN (GLUCOPHAGE) 500 mg tablet TAKE 1 TABLET WITH EVENING MEAL 90 tablet 1   • multivitamin (THERAGRAN) tablet take 1 tablet by oral route  every day     • naproxen (NAPROSYN) 500 mg tablet Take 1 tablet (500 mg total) by mouth 2 (two) times a day with meals. 30 tablet 0   • pantoprazole (PROTONIX) 40 mg EC tablet TAKE 1 TABLET EVERY DAY 90 tablet 1   • simvastatin (ZOCOR) 10 mg tablet TAKE 1 TABLET EVERY DAY IN THE EVENING 90 tablet 1   • zolpidem (AMBIEN) 10 mg tablet Take 1 tablet (10 mg total) by mouth nightly. 90 tablet 0   • azithromycin (ZITHROMAX) 250 mg tablet Take 2 tablets the first day, then 1 tablet daily for 4 days. 6 tablet 0     No current facility-administered medications for this visit.      Past Medical History:   Diagnosis Date   • Melanoma (CMS/HCC)      History reviewed. No pertinent family history.  Past Surgical History:   Procedure Laterality Date   • SKIN CANCER DESTRUCTION       Allergies   Allergen Reactions   • Penicillins      Social History     Socioeconomic History   • Marital status:      Spouse name: Not on file   • Number of children:  Not on file   • Years of education: Not on file   • Highest education level: Not on file   Occupational History   • Not on file   Social Needs   • Financial resource strain: Not on file   • Food insecurity:     Worry: Not on file     Inability: Not on file   • Transportation needs:     Medical: Not on file     Non-medical: Not on file   Tobacco Use   • Smoking status: Former Smoker   • Smokeless tobacco: Never Used   Substance and Sexual Activity   • Alcohol use: Yes   • Drug use: No   • Sexual activity: Defer   Lifestyle   • Physical activity:     Days per week: Not on file     Minutes per session: Not on file   • Stress: Not on file   Relationships   • Social connections:     Talks on phone: Not on file     Gets together: Not on file     Attends Holiness service: Not on file     Active member of club or organization: Not on file     Attends meetings of clubs or organizations: Not on file     Relationship status: Not on file   • Intimate partner violence:     Fear of current or ex partner: Not on file     Emotionally abused: Not on file     Physically abused: Not on file     Forced sexual activity: Not on file   Other Topics Concern   • Not on file   Social History Narrative   • Not on file     Vitals:    01/20/20 1430   BP: 122/80   Pulse: 65   Resp: 16   Temp: 36.7 °C (98.1 °F)   SpO2: 98%   Weight: 63.5 kg (140 lb)       Objective     Physical Exam   Constitutional: He appears well-developed and well-nourished. No distress.   HENT:   Head: Normocephalic.   Right Ear: Tympanic membrane and external ear normal.   Left Ear: Tympanic membrane and external ear normal.   Nose: Rhinorrhea and sinus tenderness present. Right sinus exhibits maxillary sinus tenderness. Left sinus exhibits maxillary sinus tenderness.   Mouth/Throat: Oropharynx is clear and moist.   Neck: Neck supple.   Cardiovascular: Normal rate and regular rhythm.   Pulmonary/Chest: Effort normal and breath sounds normal. No respiratory distress. He has  no wheezes. He has no rhonchi.   Lymphadenopathy:     He has cervical adenopathy.        Right cervical: Superficial cervical adenopathy present.        Left cervical: Superficial cervical adenopathy present.   Skin: No rash noted.       Assessment/Plan   Problem List Items Addressed This Visit        Endocrine/Metabolic    Type 2 diabetes mellitus without complication, without long-term current use of insulin (CMS/[facility])     Good diabetic control.  Current medications reviewed.  Recommend diet modifications and weight loss.   Met with patient to review diet, exercise, and glucometer readings  Reviewed medications and the importance of compliance  Diabetes health maintenance plan and follow up was discussed and understood by patient           Other Visit Diagnoses     Acute non-recurrent maxillary sinusitis        ABX DUE TO DURATION/SEVERITY          Della Hernandez,   1/21/2020

## 2020-03-31 RX ORDER — METFORMIN HYDROCHLORIDE 500 MG/1
TABLET ORAL
Qty: 90 TABLET | Refills: 0 | Status: SHIPPED | OUTPATIENT
Start: 2020-03-31 | End: 2020-07-20

## 2020-03-31 NOTE — TELEPHONE ENCOUNTER
Medicine Refill Request    Last Office Visit: 1/20/2020  Next Office Visit: 4/14/2020        Current Outpatient Medications:   •  aspirin 81 mg enteric coated tablet, Take 81 mg by mouth daily., Disp: , Rfl:   •  metFORMIN (GLUCOPHAGE) 500 mg tablet, TAKE 1 TABLET WITH EVENING MEAL, Disp: 90 tablet, Rfl: 1  •  multivitamin (THERAGRAN) tablet, take 1 tablet by oral route  every day, Disp: , Rfl:   •  naproxen (NAPROSYN) 500 mg tablet, Take 1 tablet (500 mg total) by mouth 2 (two) times a day with meals., Disp: 30 tablet, Rfl: 0  •  pantoprazole (PROTONIX) 40 mg EC tablet, TAKE 1 TABLET EVERY DAY, Disp: 90 tablet, Rfl: 1  •  simvastatin (ZOCOR) 10 mg tablet, TAKE 1 TABLET EVERY DAY IN THE EVENING, Disp: 90 tablet, Rfl: 1  •  zolpidem (AMBIEN) 10 mg tablet, Take 1 tablet (10 mg total) by mouth nightly., Disp: 90 tablet, Rfl: 0      BP Readings from Last 3 Encounters:   01/20/20 122/80   10/10/19 132/60   09/26/19 122/60       Recent Lab results:  Lab Results   Component Value Date    CHOL 142 10/10/2019   ,   Lab Results   Component Value Date    HDL 56 10/10/2019   ,   Lab Results   Component Value Date    LDLCALC 73 10/10/2019   ,   Lab Results   Component Value Date    TRIG 63 10/10/2019        Lab Results   Component Value Date    GLUCOSE 98 10/10/2019   ,   Lab Results   Component Value Date    HGBA1C 5.9 (H) 10/10/2019         Lab Results   Component Value Date    CREATININE 0.78 10/10/2019       Lab Results   Component Value Date    TSH 0.998 10/27/2015

## 2020-04-08 ENCOUNTER — TELEPHONE (OUTPATIENT)
Dept: FAMILY MEDICINE | Facility: CLINIC | Age: 70
End: 2020-04-08

## 2020-04-14 ENCOUNTER — TELEMEDICINE (OUTPATIENT)
Dept: FAMILY MEDICINE | Facility: CLINIC | Age: 70
End: 2020-04-14
Payer: MEDICARE

## 2020-04-14 DIAGNOSIS — E78.2 MIXED HYPERLIPIDEMIA: ICD-10-CM

## 2020-04-14 DIAGNOSIS — K22.70 BARRETT'S ESOPHAGUS WITHOUT DYSPLASIA: ICD-10-CM

## 2020-04-14 DIAGNOSIS — E11.9 TYPE 2 DIABETES MELLITUS WITHOUT COMPLICATION, WITHOUT LONG-TERM CURRENT USE OF INSULIN (CMS/HCC): Primary | ICD-10-CM

## 2020-04-14 PROCEDURE — 99442 PR PHYS/QHP TELEPHONE EVALUATION 11-20 MIN: CPT | Performed by: FAMILY MEDICINE

## 2020-04-14 NOTE — PROGRESS NOTES
Request for Consent:  You and I are about to have a telemedicine check-in or visit. This is allowed because you are already my patient, and you have requested it.  This telemedicine visit will be billed to your health insurance or you, if you are self-insured.  You understand you will be responsible for any copayments or coinsurances that apply to your telemedicine visit.  Before starting our telemedicine visit, I am required to get your consent for this virtual check-in or visit by telemedicine. Do you consent?      Patient Response to Request for Consent: Yes    This telemedicine visit is being performed due to the national COVID emergency per CDC guidelined  COVID counseling given    The following have been reviewed and updated as appropriate in this visit:         Visit Documentation:    Medical management of diabetes, hypertension, hyperlipidemia  FOLLOWING DIET - MIN EXERCISE    NOT checking BP or blood sugar    ROS  No vision changes  NO SOB  NO chest pain  NO GERD SX  1 x nocturia   No tingling toes    Diagnoses and all orders for this visit:    Type 2 diabetes mellitus without complication, without long-term current use of insulin (CMS/[facility])  Good diabetic control.  Current medications reviewed.  Recommend diet modifications and weight loss.   Met with patient to review diet, exercise, and glucometer readings  Reviewed medications and the importance of compliance  Diabetes health maintenance plan and follow up was discussed and understood by patient  -     CBC and Differential; Future  -     Hemoglobin A1c; Future  -     Microalbumin / creatinine urine ratio; Future  -     CBC and Differential  -     Hemoglobin A1c  -     Microalbumin / creatinine urine ratio    Mixed hyperlipidemia  GOODhyperlipidemia control  CONTINUE CURRENT THERAPY PENDING LABS  Recommend low fat diet - Risks and potential complications of hyperlipidemia reviewed  Role of medications,diet, exercise in the treatment of  hyperlipidemia discussed   Treatment plan and follow up reviewed and understood by patient  -     Basic metabolic panel; Future  -     Lipid panel; Future  -     Basic metabolic panel  -     Lipid panel    Burgos's esophagus without dysplasia  Patient was counselled regarding triggers for symptoms - avoid caffeine/spicey foods/NO smoking  Elevate HOB 30 degrees - DO NOT lie flat for at least 30 minutes after eating  Take medications as directed        Time Spent in Medical Discussion During This Encounter:  15 minutes

## 2020-04-27 RX ORDER — SIMVASTATIN 10 MG/1
TABLET, FILM COATED ORAL
Qty: 90 TABLET | Refills: 1 | Status: SHIPPED | OUTPATIENT
Start: 2020-04-27 | End: 2020-11-09

## 2020-04-27 NOTE — TELEPHONE ENCOUNTER
Medicine Refill Request    Last Office Visit: 1/20/2020  Next Office Visit: 8/18/2020        Current Outpatient Medications:   •  aspirin 81 mg enteric coated tablet, Take 81 mg by mouth daily., Disp: , Rfl:   •  metFORMIN (GLUCOPHAGE) 500 mg tablet, TAKE 1 TABLET WITH EVENING MEAL, Disp: 90 tablet, Rfl: 0  •  multivitamin (THERAGRAN) tablet, take 1 tablet by oral route  every day, Disp: , Rfl:   •  naproxen (NAPROSYN) 500 mg tablet, Take 1 tablet (500 mg total) by mouth 2 (two) times a day with meals., Disp: 30 tablet, Rfl: 0  •  pantoprazole (PROTONIX) 40 mg EC tablet, TAKE 1 TABLET EVERY DAY, Disp: 90 tablet, Rfl: 1  •  simvastatin (ZOCOR) 10 mg tablet, TAKE 1 TABLET EVERY DAY IN THE EVENING, Disp: 90 tablet, Rfl: 1  •  zolpidem (AMBIEN) 10 mg tablet, Take 1 tablet (10 mg total) by mouth nightly., Disp: 90 tablet, Rfl: 0      BP Readings from Last 3 Encounters:   01/20/20 122/80   10/10/19 132/60   09/26/19 122/60       Recent Lab results:  Lab Results   Component Value Date    CHOL 142 10/10/2019   ,   Lab Results   Component Value Date    HDL 56 10/10/2019   ,   Lab Results   Component Value Date    LDLCALC 73 10/10/2019   ,   Lab Results   Component Value Date    TRIG 63 10/10/2019        Lab Results   Component Value Date    GLUCOSE 98 10/10/2019   ,   Lab Results   Component Value Date    HGBA1C 5.9 (H) 10/10/2019         Lab Results   Component Value Date    CREATININE 0.78 10/10/2019       Lab Results   Component Value Date    TSH 0.998 10/27/2015

## 2020-06-08 RX ORDER — PANTOPRAZOLE SODIUM 40 MG/1
TABLET, DELAYED RELEASE ORAL
Qty: 90 TABLET | Refills: 1 | Status: SHIPPED | OUTPATIENT
Start: 2020-06-08 | End: 2020-11-09

## 2020-06-08 NOTE — TELEPHONE ENCOUNTER
Medicine Refill Request    Last Office Visit: 1/20/2020  Last Telemedicine Visit: 4/14/2020 Della Hernandez DO    Next Office Visit: 8/18/2020  Next Telemedicine Visit: Visit date not found         Current Outpatient Medications:   •  aspirin 81 mg enteric coated tablet, Take 81 mg by mouth daily., Disp: , Rfl:   •  metFORMIN (GLUCOPHAGE) 500 mg tablet, TAKE 1 TABLET WITH EVENING MEAL, Disp: 90 tablet, Rfl: 0  •  multivitamin (THERAGRAN) tablet, take 1 tablet by oral route  every day, Disp: , Rfl:   •  naproxen (NAPROSYN) 500 mg tablet, Take 1 tablet (500 mg total) by mouth 2 (two) times a day with meals., Disp: 30 tablet, Rfl: 0  •  pantoprazole (PROTONIX) 40 mg EC tablet, TAKE 1 TABLET EVERY DAY, Disp: 90 tablet, Rfl: 1  •  simvastatin (ZOCOR) 10 mg tablet, TAKE 1 TABLET EVERY DAY IN THE EVENING, Disp: 90 tablet, Rfl: 1  •  zolpidem (AMBIEN) 10 mg tablet, Take 1 tablet (10 mg total) by mouth nightly., Disp: 90 tablet, Rfl: 0      BP Readings from Last 3 Encounters:   01/20/20 122/80   10/10/19 132/60   09/26/19 122/60       Recent Lab results:  Lab Results   Component Value Date    CHOL 142 10/10/2019   ,   Lab Results   Component Value Date    HDL 56 10/10/2019   ,   Lab Results   Component Value Date    LDLCALC 73 10/10/2019   ,   Lab Results   Component Value Date    TRIG 63 10/10/2019        Lab Results   Component Value Date    GLUCOSE 98 10/10/2019   ,   Lab Results   Component Value Date    HGBA1C 5.9 (H) 10/10/2019         Lab Results   Component Value Date    CREATININE 0.78 10/10/2019       Lab Results   Component Value Date    TSH 0.998 10/27/2015

## 2020-06-13 LAB
ALBUMIN/CREAT UR: 9 MG/G CREAT (ref 0–29)
BASOPHILS # BLD AUTO: 0.1 X10E3/UL (ref 0–0.2)
BASOPHILS NFR BLD AUTO: 1 %
BUN SERPL-MCNC: 11 MG/DL (ref 8–27)
BUN/CREAT SERPL: 16 (ref 10–24)
CALCIUM SERPL-MCNC: 9.6 MG/DL (ref 8.6–10.2)
CHLORIDE SERPL-SCNC: 106 MMOL/L (ref 96–106)
CHOLEST SERPL-MCNC: 166 MG/DL (ref 100–199)
CO2 SERPL-SCNC: 25 MMOL/L (ref 20–29)
CREAT SERPL-MCNC: 0.7 MG/DL (ref 0.76–1.27)
CREAT UR-MCNC: 38.1 MG/DL
EOSINOPHIL # BLD AUTO: 0.2 X10E3/UL (ref 0–0.4)
EOSINOPHIL NFR BLD AUTO: 2 %
ERYTHROCYTE [DISTWIDTH] IN BLOOD BY AUTOMATED COUNT: 13.4 % (ref 11.6–15.4)
GLUCOSE SERPL-MCNC: 110 MG/DL (ref 65–99)
HCT VFR BLD AUTO: 46.2 % (ref 37.5–51)
HDLC SERPL-MCNC: 56 MG/DL
HGB BLD-MCNC: 14.9 G/DL (ref 13–17.7)
IMM GRANULOCYTES # BLD AUTO: 0 X10E3/UL (ref 0–0.1)
IMM GRANULOCYTES NFR BLD AUTO: 0 %
LAB CORP EGFR IF AFRICN AM: 111 ML/MIN/1.73
LAB CORP EGFR IF NONAFRICN AM: 96 ML/MIN/1.73
LDLC SERPL CALC-MCNC: 97 MG/DL (ref 0–99)
LYMPHOCYTES # BLD AUTO: 3.3 X10E3/UL (ref 0.7–3.1)
LYMPHOCYTES NFR BLD AUTO: 30 %
MCH RBC QN AUTO: 29.6 PG (ref 26.6–33)
MCHC RBC AUTO-ENTMCNC: 32.3 G/DL (ref 31.5–35.7)
MCV RBC AUTO: 92 FL (ref 79–97)
MICROALBUMIN UR-MCNC: 3.3 UG/ML
MONOCYTES # BLD AUTO: 0.7 X10E3/UL (ref 0.1–0.9)
MONOCYTES NFR BLD AUTO: 7 %
NEUTROPHILS # BLD AUTO: 6.7 X10E3/UL (ref 1.4–7)
NEUTROPHILS NFR BLD AUTO: 60 %
PLATELET # BLD AUTO: 316 X10E3/UL (ref 150–450)
POTASSIUM SERPL-SCNC: 4.3 MMOL/L (ref 3.5–5.2)
RBC # BLD AUTO: 5.04 X10E6/UL (ref 4.14–5.8)
SODIUM SERPL-SCNC: 142 MMOL/L (ref 134–144)
TRIGL SERPL-MCNC: 66 MG/DL (ref 0–149)
VLDLC SERPL CALC-MCNC: 13 MG/DL (ref 5–40)
WBC # BLD AUTO: 11.1 X10E3/UL (ref 3.4–10.8)

## 2020-06-14 LAB — HBA1C MFR BLD: 6.1 % (ref 4.8–5.6)

## 2020-06-16 ENCOUNTER — TELEPHONE (OUTPATIENT)
Dept: FAMILY MEDICINE | Facility: CLINIC | Age: 70
End: 2020-06-16

## 2020-07-20 RX ORDER — METFORMIN HYDROCHLORIDE 500 MG/1
TABLET ORAL
Qty: 90 TABLET | Refills: 0 | Status: SHIPPED | OUTPATIENT
Start: 2020-07-20 | End: 2020-11-09

## 2020-07-20 NOTE — TELEPHONE ENCOUNTER
Medicine Refill Request    Last Office Visit: 1/20/2020  Last Telemedicine Visit: 4/14/2020 Della Hernandez DO    Next Office Visit: 8/18/2020  Next Telemedicine Visit: Visit date not found         Current Outpatient Medications:   •  aspirin 81 mg enteric coated tablet, Take 81 mg by mouth daily., Disp: , Rfl:   •  metFORMIN (GLUCOPHAGE) 500 mg tablet, TAKE 1 TABLET WITH EVENING MEAL, Disp: 90 tablet, Rfl: 0  •  multivitamin (THERAGRAN) tablet, take 1 tablet by oral route  every day, Disp: , Rfl:   •  naproxen (NAPROSYN) 500 mg tablet, Take 1 tablet (500 mg total) by mouth 2 (two) times a day with meals., Disp: 30 tablet, Rfl: 0  •  pantoprazole (PROTONIX) 40 mg EC tablet, TAKE 1 TABLET EVERY DAY, Disp: 90 tablet, Rfl: 1  •  simvastatin (ZOCOR) 10 mg tablet, TAKE 1 TABLET EVERY DAY IN THE EVENING, Disp: 90 tablet, Rfl: 1  •  zolpidem (AMBIEN) 10 mg tablet, Take 1 tablet (10 mg total) by mouth nightly., Disp: 90 tablet, Rfl: 0      BP Readings from Last 3 Encounters:   01/20/20 122/80   10/10/19 132/60   09/26/19 122/60       Recent Lab results:  Lab Results   Component Value Date    CHOL 166 06/12/2020   ,   Lab Results   Component Value Date    HDL 56 06/12/2020   ,   Lab Results   Component Value Date    LDLCALC 97 06/12/2020   ,   Lab Results   Component Value Date    TRIG 66 06/12/2020        Lab Results   Component Value Date    GLUCOSE 110 (H) 06/12/2020   ,   Lab Results   Component Value Date    HGBA1C 6.1 (H) 06/12/2020         Lab Results   Component Value Date    CREATININE 0.70 (L) 06/12/2020       Lab Results   Component Value Date    TSH 0.998 10/27/2015

## 2020-08-18 ENCOUNTER — OFFICE VISIT (OUTPATIENT)
Dept: FAMILY MEDICINE | Facility: CLINIC | Age: 70
End: 2020-08-18
Payer: MEDICARE

## 2020-08-18 VITALS
DIASTOLIC BLOOD PRESSURE: 70 MMHG | BODY MASS INDEX: 22.82 KG/M2 | WEIGHT: 137 LBS | HEART RATE: 61 BPM | OXYGEN SATURATION: 98 % | SYSTOLIC BLOOD PRESSURE: 122 MMHG | HEIGHT: 65 IN | TEMPERATURE: 97.9 F

## 2020-08-18 DIAGNOSIS — E11.9 TYPE 2 DIABETES MELLITUS WITHOUT COMPLICATION, WITHOUT LONG-TERM CURRENT USE OF INSULIN (CMS/HCC): Primary | ICD-10-CM

## 2020-08-18 DIAGNOSIS — K22.70 BARRETT'S ESOPHAGUS WITHOUT DYSPLASIA: ICD-10-CM

## 2020-08-18 DIAGNOSIS — R35.1 NOCTURIA: ICD-10-CM

## 2020-08-18 DIAGNOSIS — E78.2 MIXED HYPERLIPIDEMIA: ICD-10-CM

## 2020-08-18 DIAGNOSIS — G47.9 SLEEP DISORDER: ICD-10-CM

## 2020-08-18 PROCEDURE — 99214 OFFICE O/P EST MOD 30 MIN: CPT | Performed by: FAMILY MEDICINE

## 2020-08-18 ASSESSMENT — ENCOUNTER SYMPTOMS
HEADACHES: 0
SLEEP DISTURBANCE: 0
FATIGUE: 0
ABDOMINAL PAIN: 0
COUGH: 0
DYSURIA: 0
ADENOPATHY: 0
UNEXPECTED WEIGHT CHANGE: 0
NAUSEA: 0
CHILLS: 0
DIARRHEA: 0
DIZZINESS: 0
BLOOD IN STOOL: 0
COLOR CHANGE: 0
CONSTIPATION: 0
NUMBNESS: 0
SHORTNESS OF BREATH: 0
HEMATURIA: 0
ARTHRALGIAS: 0
FREQUENCY: 0
BRUISES/BLEEDS EASILY: 0
PALPITATIONS: 0

## 2020-08-18 NOTE — PROGRESS NOTES
Subjective      Patient ID: Zeb Jefferson is a 69 y.o. male.    HPI     Medical management of diabetes, hypertension, hyperlipidemia  Pre visit care team luly performed  A report card has been prepared for today's visit  Following diet - walking    Eye consultants    ? Need cataract OR    The following have been reviewed and updated as appropriate in this visit:       Review of Systems   Constitutional: Negative for chills, fatigue and unexpected weight change.   Eyes: Positive for visual disturbance (exam current).   Respiratory: Negative for cough and shortness of breath.    Cardiovascular: Negative for chest pain, palpitations and leg swelling.   Gastrointestinal: Negative for abdominal pain, blood in stool, constipation, diarrhea and nausea.   Endocrine: Negative for cold intolerance and heat intolerance.   Genitourinary: Negative for dysuria, frequency, hematuria and urgency.   Musculoskeletal: Negative for arthralgias.   Skin: Negative for color change.   Neurological: Negative for dizziness, numbness and headaches.   Hematological: Negative for adenopathy. Does not bruise/bleed easily.   Psychiatric/Behavioral: Negative for sleep disturbance.       Current Outpatient Medications   Medication Sig Dispense Refill   • aspirin 81 mg enteric coated tablet Take 81 mg by mouth daily.     • metFORMIN (GLUCOPHAGE) 500 mg tablet TAKE 1 TABLET WITH EVENING MEAL 90 tablet 0   • multivitamin (THERAGRAN) tablet take 1 tablet by oral route  every day     • naproxen (NAPROSYN) 500 mg tablet Take 1 tablet (500 mg total) by mouth 2 (two) times a day with meals. 30 tablet 0   • pantoprazole (PROTONIX) 40 mg EC tablet TAKE 1 TABLET EVERY DAY 90 tablet 1   • simvastatin (ZOCOR) 10 mg tablet TAKE 1 TABLET EVERY DAY IN THE EVENING 90 tablet 1   • zolpidem (AMBIEN) 10 mg tablet Take 1 tablet (10 mg total) by mouth nightly. 90 tablet 0     No current facility-administered medications for this visit.      Past Medical History:  "  Diagnosis Date   • Melanoma (CMS/HCC)      No family history on file.  Past Surgical History:   Procedure Laterality Date   • SKIN CANCER DESTRUCTION       Allergies   Allergen Reactions   • Penicillins      Social History     Socioeconomic History   • Marital status:      Spouse name: Not on file   • Number of children: Not on file   • Years of education: Not on file   • Highest education level: Not on file   Occupational History   • Not on file   Social Needs   • Financial resource strain: Not on file   • Food insecurity:     Worry: Not on file     Inability: Not on file   • Transportation needs:     Medical: Not on file     Non-medical: Not on file   Tobacco Use   • Smoking status: Former Smoker   • Smokeless tobacco: Never Used   Substance and Sexual Activity   • Alcohol use: Yes   • Drug use: No   • Sexual activity: Defer   Lifestyle   • Physical activity:     Days per week: Not on file     Minutes per session: Not on file   • Stress: Not on file   Relationships   • Social connections:     Talks on phone: Not on file     Gets together: Not on file     Attends Denominational service: Not on file     Active member of club or organization: Not on file     Attends meetings of clubs or organizations: Not on file     Relationship status: Not on file   • Intimate partner violence:     Fear of current or ex partner: Not on file     Emotionally abused: Not on file     Physically abused: Not on file     Forced sexual activity: Not on file   Other Topics Concern   • Not on file   Social History Narrative   • Not on file     Vitals:    08/18/20 0827 08/18/20 0838   BP: 120/68 122/70   Pulse: 61    Temp: 36.6 °C (97.9 °F)    SpO2: 98%    Weight: 62.1 kg (137 lb)    Height: 1.638 m (5' 4.5\")        Objective     Physical Exam   Constitutional: He is oriented to person, place, and time. He appears well-developed and well-nourished.   HENT:   Head: Normocephalic.   Eyes: Pupils are equal, round, and reactive to light. "   Fundoscopic exam:       The right eye shows no AV nicking and no exudate.        The left eye shows no AV nicking and no exudate.   Neck: No JVD present. Carotid bruit is not present. No thyromegaly present.   Cardiovascular: Normal rate, regular rhythm, intact distal pulses and normal pulses.   No murmur heard.  Pulmonary/Chest: Effort normal and breath sounds normal.   Abdominal: Soft. Normal appearance, normal aorta and bowel sounds are normal. There is no tenderness. There is no CVA tenderness.   Feet:   Right Foot:   Protective Sensation: 10 sites tested. 10 sites sensed.   Left Foot:   Protective Sensation: 10 sites tested. 10 sites sensed.   Lymphadenopathy:     He has no cervical adenopathy.     He has no axillary adenopathy.   Neurological: He is alert and oriented to person, place, and time. No sensory deficit.   Skin: Skin is warm and dry.   Psychiatric: He has a normal mood and affect. His speech is normal and behavior is normal. Cognition and memory are normal.       Assessment/Plan   Problem List Items Addressed This Visit        Digestive    Burgos's esophagus without dysplasia     Patient was counselled regarding triggers for symptoms - avoid caffeine/spicey foods/NO smoking  Elevate HOB 30 degrees - DO NOT lie flat for at least 30 minutes after eating  Take medications as directed  Serial upper endoscopy            Genitourinary    Nocturia     Prostate cancer screening discussed  - check PSA         Relevant Orders    PSA       Endocrine/Metabolic    Mixed hyperlipidemia     GOOD hyperlipidemia controll  CONTINUE CURRENT THERAPY  Recommend low fat diet - Risks and potential complications of hyperlipidemia reviewed  Role of medications,diet, exercise in the treatment of hyperlipidemia discussed   Treatment plan and follow up reviewed and understood by patient         Relevant Orders    Lipid panel    Type 2 diabetes mellitus without complication, without long-term current use of insulin (CMS/[facility])  - Primary     Good diabetic control.  Current medications reviewed.  Recommend diet modifications and weight loss.   Met with patient to review diet, exercise, and glucometer readings  Reviewed medications and the importance of compliance  Diabetes health maintenance plan and follow up was discussed and understood by patient         Relevant Orders    Comprehensive metabolic panel    Hemoglobin A1c       Other    Sleep disorder     same meds - long term safety meds discussed               Della Hernandez,   8/19/2020

## 2020-08-20 ENCOUNTER — TELEPHONE (OUTPATIENT)
Dept: FAMILY MEDICINE | Facility: CLINIC | Age: 70
End: 2020-08-20

## 2020-10-05 ENCOUNTER — OFFICE VISIT (OUTPATIENT)
Dept: FAMILY MEDICINE | Facility: CLINIC | Age: 70
End: 2020-10-05
Payer: MEDICARE

## 2020-10-05 VITALS
TEMPERATURE: 97.3 F | WEIGHT: 141 LBS | OXYGEN SATURATION: 98 % | HEART RATE: 75 BPM | DIASTOLIC BLOOD PRESSURE: 70 MMHG | SYSTOLIC BLOOD PRESSURE: 100 MMHG | BODY MASS INDEX: 23.49 KG/M2 | HEIGHT: 65 IN

## 2020-10-05 DIAGNOSIS — M79.672 ACUTE FOOT PAIN, LEFT: Primary | ICD-10-CM

## 2020-10-05 PROCEDURE — 99212 OFFICE O/P EST SF 10 MIN: CPT | Performed by: FAMILY MEDICINE

## 2020-10-05 RX ORDER — MELOXICAM 7.5 MG/1
7.5 TABLET ORAL DAILY
Qty: 30 TABLET | Refills: 1 | Status: SHIPPED | OUTPATIENT
Start: 2020-10-05 | End: 2021-01-05 | Stop reason: SDUPTHER

## 2020-10-05 NOTE — PROGRESS NOTES
Subjective      Patient ID: Zeb Jefferson is a 70 y.o. male.    HPI     left great toe pain x1 month   NOT red/ hot or swollen  Worse with walking  -  NO injury      The following have been reviewed and updated as appropriate in this visit:  Allergies  Meds  Problems       Review of Systems   Constitutional: Negative for chills and fever.   Musculoskeletal: Positive for arthralgias and gait problem.       Current Outpatient Medications   Medication Sig Dispense Refill   • aspirin 81 mg enteric coated tablet Take 81 mg by mouth daily.     • meloxicam (MOBIC) 7.5 mg tablet Take 1 tablet (7.5 mg total) by mouth daily. 30 tablet 1   • metFORMIN (GLUCOPHAGE) 500 mg tablet TAKE 1 TABLET WITH EVENING MEAL 90 tablet 0   • multivitamin (THERAGRAN) tablet take 1 tablet by oral route  every day     • naproxen (NAPROSYN) 500 mg tablet Take 1 tablet (500 mg total) by mouth 2 (two) times a day with meals. 30 tablet 0   • pantoprazole (PROTONIX) 40 mg EC tablet TAKE 1 TABLET EVERY DAY 90 tablet 1   • simvastatin (ZOCOR) 10 mg tablet TAKE 1 TABLET EVERY DAY IN THE EVENING 90 tablet 1   • zolpidem (AMBIEN) 10 mg tablet Take 1 tablet (10 mg total) by mouth nightly. 90 tablet 0     No current facility-administered medications for this visit.      Past Medical History:   Diagnosis Date   • Melanoma (CMS/HCC)      No family history on file.  Past Surgical History:   Procedure Laterality Date   • SKIN CANCER DESTRUCTION       Allergies   Allergen Reactions   • Penicillins      Social History     Socioeconomic History   • Marital status:      Spouse name: Not on file   • Number of children: Not on file   • Years of education: Not on file   • Highest education level: Not on file   Occupational History   • Not on file   Social Needs   • Financial resource strain: Not on file   • Food insecurity     Worry: Not on file     Inability: Not on file   • Transportation needs     Medical: Not on file     Non-medical: Not on file  "  Tobacco Use   • Smoking status: Former Smoker   • Smokeless tobacco: Never Used   Substance and Sexual Activity   • Alcohol use: Yes   • Drug use: No   • Sexual activity: Defer   Lifestyle   • Physical activity     Days per week: Not on file     Minutes per session: Not on file   • Stress: Not on file   Relationships   • Social connections     Talks on phone: Not on file     Gets together: Not on file     Attends Jain service: Not on file     Active member of club or organization: Not on file     Attends meetings of clubs or organizations: Not on file     Relationship status: Not on file   • Intimate partner violence     Fear of current or ex partner: Not on file     Emotionally abused: Not on file     Physically abused: Not on file     Forced sexual activity: Not on file   Other Topics Concern   • Not on file   Social History Narrative   • Not on file     Vitals:    10/05/20 0949   BP: 100/70   Pulse: 75   Temp: 36.3 °C (97.3 °F)   TempSrc: Temporal   SpO2: 98%   Weight: 64 kg (141 lb)   Height: 1.638 m (5' 4.5\")       Objective     Physical Exam  Constitutional:       Appearance: Normal appearance.   Musculoskeletal:      Comments: Bunion deformity great toe    Decreased ROM flexion/extension  No redness/warmth   Neurological:      Mental Status: He is alert.         Assessment/Plan   Problem List Items Addressed This Visit     None      Visit Diagnoses     Acute foot pain, left    -  Primary    counseled re degenerative changes foot           Della Hernandez DO  10/6/2020  "

## 2020-10-06 ASSESSMENT — ENCOUNTER SYMPTOMS
ARTHRALGIAS: 1
FEVER: 0
CHILLS: 0

## 2020-11-02 LAB
ALBUMIN SERPL-MCNC: 4.6 G/DL (ref 3.7–4.7)
ALBUMIN/GLOB SERPL: 2.1 {RATIO} (ref 1.2–2.2)
ALP SERPL-CCNC: 61 IU/L (ref 39–117)
ALT SERPL-CCNC: 13 IU/L (ref 0–44)
AST SERPL-CCNC: 16 IU/L (ref 0–40)
BILIRUB SERPL-MCNC: 0.4 MG/DL (ref 0–1.2)
BUN SERPL-MCNC: 9 MG/DL (ref 8–27)
BUN/CREAT SERPL: 10 (ref 10–24)
CALCIUM SERPL-MCNC: 10.2 MG/DL (ref 8.6–10.2)
CHLORIDE SERPL-SCNC: 101 MMOL/L (ref 96–106)
CHOLEST SERPL-MCNC: 163 MG/DL (ref 100–199)
CO2 SERPL-SCNC: 28 MMOL/L (ref 20–29)
CREAT SERPL-MCNC: 0.87 MG/DL (ref 0.76–1.27)
GLOBULIN SER CALC-MCNC: 2.2 G/DL (ref 1.5–4.5)
GLUCOSE SERPL-MCNC: 104 MG/DL (ref 65–99)
HDLC SERPL-MCNC: 67 MG/DL
LAB CORP EGFR IF AFRICN AM: 100 ML/MIN/1.73
LAB CORP EGFR IF NONAFRICN AM: 87 ML/MIN/1.73
LDLC SERPL CALC-MCNC: 85 MG/DL (ref 0–99)
POTASSIUM SERPL-SCNC: 4.6 MMOL/L (ref 3.5–5.2)
PROT SERPL-MCNC: 6.8 G/DL (ref 6–8.5)
SODIUM SERPL-SCNC: 141 MMOL/L (ref 134–144)
TRIGL SERPL-MCNC: 52 MG/DL (ref 0–149)
VLDLC SERPL CALC-MCNC: 11 MG/DL (ref 5–40)

## 2020-11-03 LAB
HBA1C MFR BLD: 6 % (ref 4.8–5.6)
PSA SERPL-MCNC: 0.7 NG/ML (ref 0–4)

## 2020-11-09 RX ORDER — SIMVASTATIN 10 MG/1
TABLET, FILM COATED ORAL
Qty: 90 TABLET | Refills: 1 | Status: SHIPPED | OUTPATIENT
Start: 2020-11-09 | End: 2021-01-05 | Stop reason: SDUPTHER

## 2020-11-09 RX ORDER — METFORMIN HYDROCHLORIDE 500 MG/1
TABLET ORAL
Qty: 90 TABLET | Refills: 0 | Status: SHIPPED | OUTPATIENT
Start: 2020-11-09 | End: 2021-01-05 | Stop reason: SDUPTHER

## 2020-11-09 RX ORDER — PANTOPRAZOLE SODIUM 40 MG/1
TABLET, DELAYED RELEASE ORAL
Qty: 90 TABLET | Refills: 1 | Status: SHIPPED | OUTPATIENT
Start: 2020-11-09 | End: 2021-01-05 | Stop reason: SDUPTHER

## 2020-11-11 ENCOUNTER — OFFICE VISIT (OUTPATIENT)
Dept: FAMILY MEDICINE | Facility: CLINIC | Age: 70
End: 2020-11-11
Payer: MEDICARE

## 2020-11-11 ENCOUNTER — TELEPHONE (OUTPATIENT)
Dept: FAMILY MEDICINE | Facility: CLINIC | Age: 70
End: 2020-11-11

## 2020-11-11 VITALS
HEART RATE: 59 BPM | OXYGEN SATURATION: 94 % | SYSTOLIC BLOOD PRESSURE: 122 MMHG | WEIGHT: 139 LBS | HEIGHT: 64 IN | DIASTOLIC BLOOD PRESSURE: 68 MMHG | BODY MASS INDEX: 23.73 KG/M2 | TEMPERATURE: 98 F

## 2020-11-11 DIAGNOSIS — E78.2 MIXED HYPERLIPIDEMIA: ICD-10-CM

## 2020-11-11 DIAGNOSIS — Z85.820 HX OF MALIGNANT MELANOMA: ICD-10-CM

## 2020-11-11 DIAGNOSIS — Z00.00 MEDICARE ANNUAL WELLNESS VISIT, SUBSEQUENT: Primary | ICD-10-CM

## 2020-11-11 DIAGNOSIS — K22.70 BARRETT'S ESOPHAGUS WITHOUT DYSPLASIA: ICD-10-CM

## 2020-11-11 DIAGNOSIS — Z23 NEED FOR INFLUENZA VACCINATION: ICD-10-CM

## 2020-11-11 DIAGNOSIS — E11.9 TYPE 2 DIABETES MELLITUS WITHOUT COMPLICATION, WITHOUT LONG-TERM CURRENT USE OF INSULIN (CMS/HCC): ICD-10-CM

## 2020-11-11 DIAGNOSIS — G47.9 SLEEP DISORDER: ICD-10-CM

## 2020-11-11 DIAGNOSIS — R35.1 NOCTURIA: ICD-10-CM

## 2020-11-11 PROCEDURE — 90694 VACC AIIV4 NO PRSRV 0.5ML IM: CPT | Performed by: FAMILY MEDICINE

## 2020-11-11 PROCEDURE — G0008 ADMIN INFLUENZA VIRUS VAC: HCPCS | Performed by: FAMILY MEDICINE

## 2020-11-11 PROCEDURE — G0439 PPPS, SUBSEQ VISIT: HCPCS | Performed by: FAMILY MEDICINE

## 2020-11-11 PROCEDURE — 99214 OFFICE O/P EST MOD 30 MIN: CPT | Mod: 25 | Performed by: FAMILY MEDICINE

## 2020-11-11 ASSESSMENT — ENCOUNTER SYMPTOMS
COLOR CHANGE: 0
DIARRHEA: 0
CONSTIPATION: 0
DYSURIA: 0
DIZZINESS: 0
ADENOPATHY: 0
HEADACHES: 0
BRUISES/BLEEDS EASILY: 0
SHORTNESS OF BREATH: 0
NAUSEA: 0
SINUS PAIN: 0
COUGH: 0
BLOOD IN STOOL: 0
FATIGUE: 0
UNEXPECTED WEIGHT CHANGE: 0
HEMATURIA: 0
SINUS PRESSURE: 0
NUMBNESS: 0
SLEEP DISTURBANCE: 0
FREQUENCY: 0
ABDOMINAL PAIN: 0
SORE THROAT: 0
PALPITATIONS: 0
VOICE CHANGE: 0
CHEST TIGHTNESS: 0
CHILLS: 0
ARTHRALGIAS: 0

## 2020-11-11 ASSESSMENT — PATIENT HEALTH QUESTIONNAIRE - PHQ9: SUM OF ALL RESPONSES TO PHQ9 QUESTIONS 1 & 2: 1

## 2020-11-11 ASSESSMENT — MINI COG
TOTAL SCORE: 5
COMPLETED: YES

## 2020-11-11 NOTE — PROGRESS NOTES
Subjective      Patient ID: Zeb Jefferson is a 70 y.o. male.    HPI    Generally well  Following diet - walks/garden work  Recent eval by joanna bender - no evidence melanoma    The following have been reviewed and updated as appropriate in this visit:  Allergies  Meds  Problems       Review of Systems   Constitutional: Negative for chills, fatigue and unexpected weight change.   HENT: Negative for dental problem, hearing loss, sinus pressure, sinus pain, sore throat and voice change.    Eyes: Negative for visual disturbance.   Respiratory: Negative for cough, chest tightness and shortness of breath.    Cardiovascular: Negative for chest pain, palpitations and leg swelling.   Gastrointestinal: Negative for abdominal pain, blood in stool, constipation, diarrhea and nausea.   Endocrine: Negative for cold intolerance and heat intolerance.   Genitourinary: Negative for dysuria, frequency, hematuria and urgency.   Musculoskeletal: Negative for arthralgias.   Skin: Negative for color change and rash.   Neurological: Negative for dizziness, numbness and headaches.   Hematological: Negative for adenopathy. Does not bruise/bleed easily.   Psychiatric/Behavioral: Negative for sleep disturbance.       Current Outpatient Medications   Medication Sig Dispense Refill   • aspirin 81 mg enteric coated tablet Take 81 mg by mouth daily.     • meloxicam (MOBIC) 7.5 mg tablet Take 1 tablet (7.5 mg total) by mouth daily. 30 tablet 1   • metFORMIN (GLUCOPHAGE) 500 mg tablet TAKE 1 TABLET WITH EVENING MEAL 90 tablet 0   • multivitamin (THERAGRAN) tablet take 1 tablet by oral route  every day     • pantoprazole (PROTONIX) 40 mg EC tablet TAKE 1 TABLET EVERY DAY 90 tablet 1   • simvastatin (ZOCOR) 10 mg tablet TAKE 1 TABLET EVERY DAY IN THE EVENING 90 tablet 1   • naproxen (NAPROSYN) 500 mg tablet Take 1 tablet (500 mg total) by mouth 2 (two) times a day with meals. 30 tablet 0   • zolpidem (AMBIEN) 10 mg tablet Take 1 tablet (10 mg  "total) by mouth nightly. 90 tablet 0     No current facility-administered medications for this visit.      Past Medical History:   Diagnosis Date   • Melanoma (CMS/HCC)      History reviewed. No pertinent family history.  Past Surgical History:   Procedure Laterality Date   • SKIN CANCER DESTRUCTION       Allergies   Allergen Reactions   • Penicillins      Social History     Socioeconomic History   • Marital status:      Spouse name: Not on file   • Number of children: Not on file   • Years of education: Not on file   • Highest education level: Not on file   Occupational History   • Not on file   Social Needs   • Financial resource strain: Not on file   • Food insecurity     Worry: Not on file     Inability: Not on file   • Transportation needs     Medical: Not on file     Non-medical: Not on file   Tobacco Use   • Smoking status: Former Smoker   • Smokeless tobacco: Never Used   Substance and Sexual Activity   • Alcohol use: Yes   • Drug use: No   • Sexual activity: Defer   Lifestyle   • Physical activity     Days per week: Not on file     Minutes per session: Not on file   • Stress: Not on file   Relationships   • Social connections     Talks on phone: Not on file     Gets together: Not on file     Attends Shinto service: Not on file     Active member of club or organization: Not on file     Attends meetings of clubs or organizations: Not on file     Relationship status: Not on file   • Intimate partner violence     Fear of current or ex partner: Not on file     Emotionally abused: Not on file     Physically abused: Not on file     Forced sexual activity: Not on file   Other Topics Concern   • Not on file   Social History Narrative   • Not on file     Vitals:    11/11/20 1001 11/11/20 1025   BP: 112/68 122/68   BP Location: Left upper arm    Patient Position: Sitting    Pulse: (!) 59    Temp: 36.7 °C (98 °F)    TempSrc: Temporal    SpO2: 94%    Weight: 63 kg (139 lb)    Height: 1.638 m (5' 4.49\")  "       Objective     Physical Exam  Constitutional:       Appearance: Normal appearance. He is well-developed.   HENT:      Head: Normocephalic.      Right Ear: Tympanic membrane and external ear normal.      Left Ear: Tympanic membrane and external ear normal.      Nose: Nose normal.      Mouth/Throat:      Mouth: Mucous membranes are moist.      Pharynx: Oropharynx is clear. No oropharyngeal exudate.   Eyes:      Conjunctiva/sclera: Conjunctivae normal.      Pupils: Pupils are equal, round, and reactive to light.      Funduscopic exam:     Right eye: No AV nicking.         Left eye: No AV nicking.   Neck:      Thyroid: No thyromegaly.      Vascular: No carotid bruit.   Cardiovascular:      Rate and Rhythm: Normal rate and regular rhythm.      Pulses: Normal pulses.           Carotid pulses are 2+ on the right side and 2+ on the left side.       Radial pulses are 2+ on the right side and 2+ on the left side.        Femoral pulses are 2+ on the right side and 2+ on the left side.       Popliteal pulses are 2+ on the right side and 2+ on the left side.        Dorsalis pedis pulses are 2+ on the right side and 2+ on the left side.        Posterior tibial pulses are 2+ on the right side and 2+ on the left side.      Heart sounds: Murmur present. Systolic murmur present with a grade of 2/6.   Pulmonary:      Effort: Pulmonary effort is normal.      Breath sounds: Normal breath sounds.   Abdominal:      General: Bowel sounds are normal. There is no abdominal bruit.      Palpations: Abdomen is soft. There is no hepatomegaly or splenomegaly.      Tenderness: There is no abdominal tenderness. There is no right CVA tenderness or left CVA tenderness.      Hernia: There is no hernia in the left inguinal area or right inguinal area.   Genitourinary:     Prostate: Normal. No nodules present.   Feet:      Right foot:      Protective Sensation: 10 sites tested. 10 sites sensed.      Left foot:      Protective Sensation: 10 sites  tested. 10 sites sensed.   Lymphadenopathy:      Cervical: No cervical adenopathy.      Upper Body:      Right upper body: No supraclavicular adenopathy.      Left upper body: No supraclavicular adenopathy.   Skin:     General: Skin is warm and dry.   Neurological:      Mental Status: He is alert and oriented to person, place, and time.      Sensory: No sensory deficit.   Psychiatric:         Mood and Affect: Mood normal.         Speech: Speech normal.         Behavior: Behavior normal.         Cognition and Memory: Cognition normal.         Assessment/Plan   Problem List Items Addressed This Visit        Digestive    Burgos's esophagus without dysplasia     Patient was counselled regarding triggers for symptoms - avoid caffeine/spicey foods/NO smoking  Elevate HOB 30 degrees - DO NOT lie flat for at least 30 minutes after eating  Take medications as directed  Serial upper endoscopy            Genitourinary    Nocturia     Prostate cancer screening discussed  - check PSA            Endocrine/Metabolic    Mixed hyperlipidemia     GOOD hyperlipidemia controll  CONTINUE CURRENT THERAPY  Recommend low fat diet - Risks and potential complications of hyperlipidemia reviewed  Role of medications,diet, exercise in the treatment of hyperlipidemia discussed   Treatment plan and follow up reviewed and understood by patient         Type 2 diabetes mellitus without complication, without long-term current use of insulin (CMS/[facility])     Good diabetic control.  Current medications reviewed.  Recommend diet modifications and weight loss.   Met with patient to review diet, exercise, and glucometer readings  Reviewed medications and the importance of compliance  Diabetes health maintenance plan and follow up was discussed and understood by patient         Relevant Orders    Basic metabolic panel    Hemoglobin A1c       Other    Hx of malignant melanoma     SHARRON         Sleep disorder     same meds - long term safety meds discussed            Other Visit Diagnoses     Medicare annual wellness visit, subsequent    -  Primary    Need for influenza vaccination        Relevant Orders    Influenza vaccine Quad Adjuvanted 65 and Older (FluAd Quad) (Completed)          Della Hernandez,   11/14/2020

## 2020-11-11 NOTE — PATIENT INSTRUCTIONS
"Patient Education     Influenza, Adult  Influenza, more commonly known as \"the flu,\" is a viral infection that mainly affects the respiratory tract. The respiratory tract includes organs that help you breathe, such as the lungs, nose, and throat. The flu causes many symptoms similar to the common cold along with high fever and body aches.  The flu spreads easily from person to person (is contagious). Getting a flu shot (influenza vaccination) every year is the best way to prevent the flu.  What are the causes?  This condition is caused by the influenza virus. You can get the virus by:  · Breathing in droplets that are in the air from an infected person's cough or sneeze.  · Touching something that has been exposed to the virus (has been contaminated) and then touching your mouth, nose, or eyes.  What increases the risk?  The following factors may make you more likely to get the flu:  · Not washing or sanitizing your hands often.  · Having close contact with many people during cold and flu season.  · Touching your mouth, eyes, or nose without first washing or sanitizing your hands.  · Not getting a yearly (annual) flu shot.  You may have a higher risk for the flu, including serious problems such as a lung infection (pneumonia), if you:  · Are older than 65.  · Are pregnant.  · Have a weakened disease-fighting system (immune system). You may have a weakened immune system if you:  ? Have HIV or AIDS.  ? Are undergoing chemotherapy.  ? Are taking medicines that reduce (suppress) the activity of your immune system.  · Have a long-term (chronic) illness, such as heart disease, kidney disease, diabetes, or lung disease.  · Have a liver disorder.  · Are severely overweight (morbidly obese).  · Have anemia. This is a condition that affects your red blood cells.  · Have asthma.  What are the signs or symptoms?  Symptoms of this condition usually begin suddenly and last 4-14 days. They may include:  · Fever and " chills.  · Headaches, body aches, or muscle aches.  · Sore throat.  · Cough.  · Runny or stuffy (congested) nose.  · Chest discomfort.  · Poor appetite.  · Weakness or fatigue.  · Dizziness.  · Nausea or vomiting.  How is this diagnosed?  This condition may be diagnosed based on:  · Your symptoms and medical history.  · A physical exam.  · Swabbing your nose or throat and testing the fluid for the influenza virus.  How is this treated?  If the flu is diagnosed early, you can be treated with medicine that can help reduce how severe the illness is and how long it lasts (antiviral medicine). This may be given by mouth (orally) or through an IV.  Taking care of yourself at home can help relieve symptoms. Your health care provider may recommend:  · Taking over-the-counter medicines.  · Drinking plenty of fluids.  In many cases, the flu goes away on its own. If you have severe symptoms or complications, you may be treated in a hospital.  Follow these instructions at home:  Activity  · Rest as needed and get plenty of sleep.  · Stay home from work or school as told by your health care provider. Unless you are visiting your health care provider, avoid leaving home until your fever has been gone for 24 hours without taking medicine.  Eating and drinking  · Take an oral rehydration solution (ORS). This is a drink that is sold at pharmacies and retail stores.  · Drink enough fluid to keep your urine pale yellow.  · Drink clear fluids in small amounts as you are able. Clear fluids include water, ice chips, diluted fruit juice, and low-calorie sports drinks.  · Eat bland, easy-to-digest foods in small amounts as you are able. These foods include bananas, applesauce, rice, lean meats, toast, and crackers.  · Avoid drinking fluids that contain a lot of sugar or caffeine, such as energy drinks, regular sports drinks, and soda.  · Avoid alcohol.  · Avoid spicy or fatty foods.  General instructions         · Take over-the-counter and  "prescription medicines only as told by your health care provider.  · Use a cool mist humidifier to add humidity to the air in your home. This can make it easier to breathe.  · Cover your mouth and nose when you cough or sneeze.  · Wash your hands with soap and water often, especially after you cough or sneeze. If soap and water are not available, use alcohol-based hand .  · Keep all follow-up visits as told by your health care provider. This is important.  How is this prevented?    · Get an annual flu shot. You may get the flu shot in late summer, fall, or winter. Ask your health care provider when you should get your flu shot.  · Avoid contact with people who are sick during cold and flu season. This is generally fall and winter.  Contact a health care provider if:  · You develop new symptoms.  · You have:  ? Chest pain.  ? Diarrhea.  ? A fever.  · Your cough gets worse.  · You produce more mucus.  · You feel nauseous or you vomit.  Get help right away if:  · You develop shortness of breath or difficulty breathing.  · Your skin or nails turn a bluish color.  · You have severe pain or stiffness in your neck.  · You develop a sudden headache or sudden pain in your face or ear.  · You cannot eat or drink without vomiting.  Summary  · Influenza, more commonly known as \"the flu,\" is a viral infection that primarily affects your respiratory tract.  · Symptoms of the flu usually begin suddenly and last 4-14 days.  · Getting an annual flu shot is the best way to prevent getting the flu.  · Stay home from work or school as told by your health care provider. Unless you are visiting your health care provider, avoid leaving home until your fever has been gone for 24 hours without taking medicine.  · Keep all follow-up visits as told by your health care provider. This is important.  This information is not intended to replace advice given to you by your health care provider. Make sure you discuss any questions you have " with your health care provider.  Document Released: 12/15/2001 Document Revised: 03/19/2020 Document Reviewed: 06/05/2019  Elsevier Patient Education © 2020 [employer]                            Your Personalized Prevention Plan Services (PPPS)    Preventive Services Checklist (Assumes Average Risk Unless Otherwise Noted):    Health Maintenance Topics with due status: Overdue       Topic Date Due    Varicella Vaccines 08/31/1950    DTaP, Tdap, and Td Vaccines 08/31/1968    Zoster Vaccine 08/31/1999    Annual Falls Risk Screening 01/01/2020    Influenza Vaccine 08/01/2020    Medicare Annual Wellness Visit 10/10/2020     Health Maintenance Topics with due status: Not Due       Topic Last Completion Date    Colonoscopy 04/23/2013    Annual Dilated Retinal Exam 05/19/2020    Urine Protein Screening 06/12/2020    Diabetic Foot Exam 08/19/2020    Hemoglobin A1C 11/02/2020     Health Maintenance Topics with due status: Completed       Topic Last Completion Date    Hepatitis C Screening 08/24/2016    Pneumococcal (65 years and older) 12/22/2016     Health Maintenance Topics with due status: Aged Out       Topic Date Due    Meningococcal ACWY Aged Out    HIB Vaccines Aged Out    IPV Vaccines Aged Out    HPV Vaccines Aged Out    Pneumococcal Aged Out       You May Be Eligible for These Additional Preventive Services   (Assumes Average Risk Unless Otherwise Noted)  Diabetes Screening Any 1 risk factor: hypertension, dyslipidemia, obesity, high glucose; or Any 2 risk factors: >=64yo, overweight, family history diabetes (covered every 6 months)   Hepatitis C Screening Any 1 risk factor: 1) blood transfusion before 1992,   2) current or past injection drug use (annually for high risk; if born between 1844-[birth year], see above for status).   Vaccine: Hepatitis B As necessary if at-risk: hemophilia, ESRD, diabetes, living with individual infected with hep B, healthcare worker with frequent contact with blood/bodily fluids (series  covered once)   Sexually Transmitted Diseases (STDs) As necessary chlamydia, gonorrhea, syphilis, hepatitis B (covered annually)  HIV if any 1 risk factor present: 1) <16yo or >64yo and at increased risk or 2) 15-64yo and ask for it (covered annually)   Lung Cancer Screening Low dose chest CT if all 3 risk factors: 1) 55-78yo, 2) smoker or quit within last 15y, 3) >=30 pack years (covered annually).  No results found for this or any previous visit.     Cholesterol Screening No signs or symptoms of cardiovascular disease (screening covered every 5 years).     Prostate Cancer Screening >= 51yo if patient desires test after weighting potential harms and benefits (covered annually)     Abdominal Aortic Aneurysm Screening Any 1 risk factor: 1) family history of AAA or 2) 65-74yo and smoked 100+ cigarettes in a lifetime (covered once).   No results found for this or any previous visit. No results found for this or any previous visit.     Glaucoma Screening Any 1 risk factor: 1) diabetes, 2) family history glaucoma, 3)  >=51yo, 4)  American >=64yo (covered annually)           Health Risk Factors with Personalized Education:  ----------------------------------------------------------------------------------------------------------------------  Controlling Your Diabetes  · Stress can raise your blood sugar.  Learn what causes your stress.  Learn to lower your stress by spending time with family and friends, exercising, deep breathing, trying meditation or yoga, enjoying hobbies and getting enough rest.  Let your PCP know if you’re having problems limiting your stress.  · Maintain a healthy weight by balancing your diet and exercise.  · Choose foods that are lower in calories, saturated fat, trans fat, sugar and salt.  Eat foods with more fiber, like whole grain cereals, bread, crackers, rice or pasta.  Choose to eat fruit, vegetables, and low-fat or fat-free/skim dairy.  · Reduce the portion size of  your meals.  Make half of your meal vegetables and fruit, and divide the other half between lean protein and whole grains.  · Drink water instead of juice and regular soda.  · Spend at least some time being active on most days of the week.  · Work to increase your muscle strength at least twice per week.  Try things like light weights, stretch bands, yoga, heavy gardening (digging, planting with tools) or push-ups.  · If you have been prescribed medication, take it regularly and exactly as prescribed.  Let your PCP know if you have any problems or questions about your medication.  · If you have been asked to check your blood sugar, write down your readings and share them with your PCP  ----------------------------------------------------------------------------------------------------------------------  Controlling Your Cholesterol  · Reduce the amount of saturated and trans fat in your diet.  Limit intake of red meat.  Consume only low-fat or non-fat/skim dairy.  Limit fried food.  Cook with vegetable oils.  · Reduce your intake of sugary foods, sugary drinks and alcohol.  · Eat a diet high in fruit, vegetables and whole grains.  · Get protein from fish, poultry and a small portion of nuts.  · Stay active.  Try to get at least 90 to 150 minutes of exercise per week.  Try brisk walking, swimming, bicycling or dancing.  · Maintain a healthy weight by balancing your diet and exercise.  · If you have been prescribed medication, take it regularly and exactly as prescribed. Let your PCP know if you have any problems or questions about your medication.  · It’s important to know your cholesterol numbers.  When recommended by your PCP, get the cholesterol blood test.  ----------------------------------------------------------------------------------------------------------------------  Reducing Your Risk of Falls  · Tell your PCP if any of your medications make you feel tired, dizzy, lightheaded or off-balance.  · Maintain  coordination, flexibility and balance by ensuring regular physical activity.  · Limit alcohol intake to 1 drink per day.  Consider avoiding all alcohol intake.  · Ensure good vision.  Visit an ophthalmologist or optometrist regularly for vision screening or to make sure your glasses / contact lens prescription is correct.  If you need glasses or contacts, wear them.  When you get new glasses or contacts, take time to get used to them.  Do not wear sunglasses or tinted lenses when indoors.  · Ensure good hearing.  Have your hearing checked if you are having trouble hearing, or family and friends think you cannot hear them.  If you need a hearing aid, be sure it fits well and wear it.  · Get enough rest.  Ensure about 7-9 hours of sleep every day.  · Get up slowly from your bed or chairs.  Do not start walking until you are sure you feel steady.  · Wear non-skid, rubber-soled, low-heeled shoes.  Do not walk in socks, or in shoes and slippers with smooth soles.  · If your PCP or therapist recommends using a cane or walker, use it regularly.  · Make your home safer.  Increase lighting throughout the house, especially at the top and bottom of stairs.  Ensure lighting is easily turned on when getting up in the middle of the night.  Make sure there are two secure rails on all stairs.  Install grab bars in the bathtub / shower and near the toilet.  Consider using a shower chair and / or a hand-held shower.  · Spread sand or salt on icy surfaces.  Beware of wet surfaces, which can be icy.  · Tell your PCP if you have fallen.    CHECK LABS  KEEP UP THE GOOD WORK !!!

## 2020-11-11 NOTE — PROGRESS NOTES
Subjective     Zeb Jefferson is a 70 y.o. male who presents for a subsequent annual wellness visit.     Patient Care Team:  Della Hernandez DO as PCP - General  Zeynep Shah RN as Care Coordinator  Ernst Valdivia MD as Referring Physician  Jacob Greenwood MD as Referring Physician (Ophthalmology)    Comprehensive Medical and Social History  Patient Active Problem List   Diagnosis   • Burgos's esophagus without dysplasia   • Hx of malignant melanoma   • Mixed hyperlipidemia   • Sleep disorder   • Type 2 diabetes mellitus without complication, without long-term current use of insulin (CMS/HCC)   • Nocturia     Past Medical History:   Diagnosis Date   • Melanoma (CMS/HCC)      Past Surgical History:   Procedure Laterality Date   • SKIN CANCER DESTRUCTION       Allergies   Allergen Reactions   • Penicillins      Current Outpatient Medications   Medication Sig Dispense Refill   • aspirin 81 mg enteric coated tablet Take 81 mg by mouth daily.     • meloxicam (MOBIC) 7.5 mg tablet Take 1 tablet (7.5 mg total) by mouth daily. 30 tablet 1   • metFORMIN (GLUCOPHAGE) 500 mg tablet TAKE 1 TABLET WITH EVENING MEAL 90 tablet 0   • multivitamin (THERAGRAN) tablet take 1 tablet by oral route  every day     • pantoprazole (PROTONIX) 40 mg EC tablet TAKE 1 TABLET EVERY DAY 90 tablet 1   • simvastatin (ZOCOR) 10 mg tablet TAKE 1 TABLET EVERY DAY IN THE EVENING 90 tablet 1   • naproxen (NAPROSYN) 500 mg tablet Take 1 tablet (500 mg total) by mouth 2 (two) times a day with meals. 30 tablet 0   • zolpidem (AMBIEN) 10 mg tablet Take 1 tablet (10 mg total) by mouth nightly. 90 tablet 0     No current facility-administered medications for this visit.      Social History     Tobacco Use   • Smoking status: Former Smoker   • Smokeless tobacco: Never Used   Substance Use Topics   • Alcohol use: Yes   • Drug use: No     History reviewed. No pertinent family history.    Objective   Vitals  Vitals:    11/11/20 1001  "  BP: 112/68   BP Location: Left upper arm   Patient Position: Sitting   Pulse: (!) 59   Temp: 36.7 °C (98 °F)   TempSrc: Temporal   SpO2: 94%   Weight: 63 kg (139 lb)   Height: 1.638 m (5' 4.49\")     Body mass index is 23.5 kg/m².    Advanced Care Plan                                        PHQ  Will the patinet answer the depression questions?: Yes   Over the past 2 weeks, how often have you been bothered by feeling little interest or pleasure in doing things?: Not at all   Over the past 2 weeks, how often have you been bothered by feeling down, depressed, or hopeless?: Several days   Depression Risk: 1                                             Mini Cog  Completed: Yes  Score: 5  Result: Negative      Get Up and Go  Result: Pass    STEADI Falls Risk  One or more falls in the last year: No           Has trouble stepping up onto a curb: No   Advised to use a cane or walker to get around safely: No   Often has to rush to the toilet: No   Feels unsteady when walking: No   Has lost some feeling in feet: No   Often feels sad or depressed: No   Steadies self on furniture while walking at home: No   Takes medication that makes him/her feel lightheaded or more tired than usual: No   Worried about falling: No   Takes medicine to sleep or improve mood: No   Needs to push with hands when rising from a chair: No   Falls screen completed: Yes     Hearing and Vision Screening  No exam data present  See HRA for relevant hearing screening response.        See Patient Instructions (the written plan) which was given to the patient for PPPS and health risk factors with interventions.   "

## 2020-12-09 RX ORDER — ZOLPIDEM TARTRATE 10 MG/1
10 TABLET ORAL NIGHTLY
Qty: 90 TABLET | Refills: 0 | Status: SHIPPED | OUTPATIENT
Start: 2020-12-09 | End: 2021-11-17

## 2021-01-05 RX ORDER — PANTOPRAZOLE SODIUM 40 MG/1
40 TABLET, DELAYED RELEASE ORAL
Qty: 90 TABLET | Refills: 0 | OUTPATIENT
Start: 2021-01-05

## 2021-01-05 RX ORDER — SIMVASTATIN 10 MG/1
10 TABLET, FILM COATED ORAL NIGHTLY
Qty: 90 TABLET | Refills: 0 | OUTPATIENT
Start: 2021-01-05

## 2021-01-05 RX ORDER — SIMVASTATIN 10 MG/1
10 TABLET, FILM COATED ORAL NIGHTLY
Qty: 90 TABLET | Refills: 0 | Status: SHIPPED | OUTPATIENT
Start: 2021-01-05 | End: 2021-05-08

## 2021-01-05 RX ORDER — MELOXICAM 7.5 MG/1
7.5 TABLET ORAL DAILY
Qty: 90 TABLET | Refills: 0 | OUTPATIENT
Start: 2021-01-05 | End: 2021-07-04

## 2021-01-05 RX ORDER — MELOXICAM 7.5 MG/1
7.5 TABLET ORAL DAILY
Qty: 90 TABLET | Refills: 0 | Status: SHIPPED | OUTPATIENT
Start: 2021-01-05 | End: 2021-01-28

## 2021-01-05 RX ORDER — METFORMIN HYDROCHLORIDE 500 MG/1
500 TABLET ORAL
Qty: 90 TABLET | Refills: 0 | Status: SHIPPED | OUTPATIENT
Start: 2021-01-05 | End: 2021-05-08

## 2021-01-05 RX ORDER — METFORMIN HYDROCHLORIDE 500 MG/1
500 TABLET ORAL
Qty: 90 TABLET | Refills: 0 | OUTPATIENT
Start: 2021-01-05

## 2021-01-05 RX ORDER — PANTOPRAZOLE SODIUM 40 MG/1
40 TABLET, DELAYED RELEASE ORAL
Qty: 90 TABLET | Refills: 0 | Status: SHIPPED | OUTPATIENT
Start: 2021-01-05 | End: 2021-08-09 | Stop reason: SDUPTHER

## 2021-01-05 NOTE — TELEPHONE ENCOUNTER
Pt changed insurance and is not anne-marie if retail or mail order will be more cost effective. Pt call insurance and they stated he needs to try both    Pt would like mail order and retail 90 days sent in of precriptions

## 2021-01-28 ENCOUNTER — OFFICE VISIT (OUTPATIENT)
Dept: FAMILY MEDICINE | Facility: CLINIC | Age: 71
End: 2021-01-28
Payer: MEDICARE

## 2021-01-28 VITALS
OXYGEN SATURATION: 97 % | HEIGHT: 64 IN | DIASTOLIC BLOOD PRESSURE: 72 MMHG | HEART RATE: 70 BPM | WEIGHT: 142 LBS | BODY MASS INDEX: 24.24 KG/M2 | TEMPERATURE: 97.2 F | SYSTOLIC BLOOD PRESSURE: 122 MMHG

## 2021-01-28 DIAGNOSIS — K41.90 FEMORAL HERNIA OF RIGHT SIDE: ICD-10-CM

## 2021-01-28 DIAGNOSIS — R04.0 NASAL HEMORRHAGE: Primary | ICD-10-CM

## 2021-01-28 PROCEDURE — 99213 OFFICE O/P EST LOW 20 MIN: CPT | Performed by: FAMILY MEDICINE

## 2021-01-28 ASSESSMENT — ENCOUNTER SYMPTOMS
FREQUENCY: 0
DIARRHEA: 0
CONSTIPATION: 0
FEVER: 0
CHILLS: 0
DYSURIA: 0

## 2021-01-28 NOTE — PROGRESS NOTES
"Subjective      Patient ID: Zeb Jefferson is a 70 y.o. male.    HPI     Awakened this AM with bloody pillow   no recent congestion  Does have forced air heat     Right groin \"lump\" - ?how long peresent    Discomfort x few days    The following have been reviewed and updated as appropriate in this visit:  Allergies  Meds  Problems       Review of Systems   Constitutional: Negative for chills and fever.   Gastrointestinal: Negative for constipation and diarrhea.   Genitourinary: Negative for dysuria and frequency.       Current Outpatient Medications   Medication Sig Dispense Refill   • aspirin 81 mg enteric coated tablet Take 81 mg by mouth daily.     • metFORMIN (GLUCOPHAGE) 500 mg tablet Take 1 tablet (500 mg total) by mouth daily with dinner. 90 tablet 0   • multivitamin (THERAGRAN) tablet take 1 tablet by oral route  every day     • pantoprazole (PROTONIX) 40 mg EC tablet Take 1 tablet (40 mg total) by mouth once daily. 90 tablet 0   • simvastatin (ZOCOR) 10 mg tablet Take 1 tablet (10 mg total) by mouth nightly. 90 tablet 0   • zolpidem (AMBIEN) 10 mg tablet Take 1 tablet (10 mg total) by mouth nightly. 90 tablet 0   • naproxen (NAPROSYN) 500 mg tablet Take 1 tablet (500 mg total) by mouth 2 (two) times a day with meals. 30 tablet 0     No current facility-administered medications for this visit.      Past Medical History:   Diagnosis Date   • Melanoma (CMS/HCC)      No family history on file.  Past Surgical History:   Procedure Laterality Date   • SKIN CANCER DESTRUCTION       Allergies   Allergen Reactions   • Penicillins      Social History     Socioeconomic History   • Marital status:      Spouse name: Not on file   • Number of children: Not on file   • Years of education: Not on file   • Highest education level: Not on file   Occupational History   • Not on file   Social Needs   • Financial resource strain: Not on file   • Food insecurity     Worry: Not on file     Inability: Not on file   • " "Transportation needs     Medical: Not on file     Non-medical: Not on file   Tobacco Use   • Smoking status: Former Smoker   • Smokeless tobacco: Never Used   Substance and Sexual Activity   • Alcohol use: Yes   • Drug use: No   • Sexual activity: Defer   Lifestyle   • Physical activity     Days per week: Not on file     Minutes per session: Not on file   • Stress: Not on file   Relationships   • Social connections     Talks on phone: Not on file     Gets together: Not on file     Attends Holiness service: Not on file     Active member of club or organization: Not on file     Attends meetings of clubs or organizations: Not on file     Relationship status: Not on file   • Intimate partner violence     Fear of current or ex partner: Not on file     Emotionally abused: Not on file     Physically abused: Not on file     Forced sexual activity: Not on file   Other Topics Concern   • Not on file   Social History Narrative   • Not on file     Vitals:    01/28/21 1435   BP: 122/72   BP Location: Left upper arm   Patient Position: Sitting   Pulse: 70   Temp: 36.2 °C (97.2 °F)   TempSrc: Temporal   SpO2: 97%   Weight: 64.4 kg (142 lb)   Height: 1.638 m (5' 4.49\")       Objective     Physical Exam  Constitutional:       General: He is not in acute distress.  HENT:      Head: Normocephalic.      Right Ear: Tympanic membrane normal.      Left Ear: Tympanic membrane normal.      Nose: No nasal deformity, signs of injury, mucosal edema or rhinorrhea.      Left Nostril: Epistaxis (anterior vessel inflammation lateral) present.      Right Sinus: No maxillary sinus tenderness.      Left Sinus: No maxillary sinus tenderness.   Neck:      Musculoskeletal: Normal range of motion.      Thyroid: No thyromegaly.   Pulmonary:      Effort: Pulmonary effort is normal.      Breath sounds: Normal breath sounds.   Abdominal:      General: Bowel sounds are normal.      Palpations: Abdomen is soft. There is no hepatomegaly or splenomegaly.      " Tenderness: There is no abdominal tenderness. There is no right CVA tenderness or left CVA tenderness.      Hernia: A hernia is present. Hernia is present in the right femoral area.   Lymphadenopathy:      Cervical: No cervical adenopathy.   Skin:     General: Skin is warm.   Neurological:      Mental Status: He is alert.         Assessment/Plan   Problem List Items Addressed This Visit     None      Visit Diagnoses     Nasal hemorrhage    -  Primary    AgNO3 applied to passage    Femoral hernia of right side        signs of incarceration discussed          Della Hernandez DO  1/31/2021

## 2021-03-05 LAB
BUN SERPL-MCNC: 12 MG/DL (ref 8–27)
BUN/CREAT SERPL: 16 (ref 10–24)
CALCIUM SERPL-MCNC: 9.9 MG/DL (ref 8.6–10.2)
CHLORIDE SERPL-SCNC: 101 MMOL/L (ref 96–106)
CO2 SERPL-SCNC: 28 MMOL/L (ref 20–29)
CREAT SERPL-MCNC: 0.73 MG/DL (ref 0.76–1.27)
GLUCOSE SERPL-MCNC: 104 MG/DL (ref 65–99)
HBA1C MFR BLD: 6.2 % (ref 4.8–5.6)
LAB CORP EGFR IF AFRICN AM: 108 ML/MIN/1.73
LAB CORP EGFR IF NONAFRICN AM: 93 ML/MIN/1.73
POTASSIUM SERPL-SCNC: 4.4 MMOL/L (ref 3.5–5.2)
SODIUM SERPL-SCNC: 141 MMOL/L (ref 134–144)

## 2021-03-25 ENCOUNTER — OFFICE VISIT (OUTPATIENT)
Dept: FAMILY MEDICINE | Facility: CLINIC | Age: 71
End: 2021-03-25
Payer: MEDICARE

## 2021-03-25 VITALS
HEART RATE: 55 BPM | WEIGHT: 142 LBS | BODY MASS INDEX: 24.24 KG/M2 | RESPIRATION RATE: 16 BRPM | HEIGHT: 64 IN | SYSTOLIC BLOOD PRESSURE: 130 MMHG | TEMPERATURE: 97.3 F | DIASTOLIC BLOOD PRESSURE: 70 MMHG | OXYGEN SATURATION: 97 %

## 2021-03-25 DIAGNOSIS — K22.70 BARRETT'S ESOPHAGUS WITHOUT DYSPLASIA: ICD-10-CM

## 2021-03-25 DIAGNOSIS — E11.9 TYPE 2 DIABETES MELLITUS WITHOUT COMPLICATION, WITHOUT LONG-TERM CURRENT USE OF INSULIN (CMS/HCC): Primary | ICD-10-CM

## 2021-03-25 DIAGNOSIS — G47.9 SLEEP DISORDER: ICD-10-CM

## 2021-03-25 DIAGNOSIS — E78.2 MIXED HYPERLIPIDEMIA: ICD-10-CM

## 2021-03-25 PROCEDURE — 99214 OFFICE O/P EST MOD 30 MIN: CPT | Performed by: FAMILY MEDICINE

## 2021-03-25 ASSESSMENT — ENCOUNTER SYMPTOMS
SLEEP DISTURBANCE: 0
DYSURIA: 0
BLOOD IN STOOL: 0
FATIGUE: 0
COUGH: 0
ARTHRALGIAS: 0
HEMATURIA: 0
DIZZINESS: 0
CHILLS: 0
ADENOPATHY: 0
UNEXPECTED WEIGHT CHANGE: 0
PALPITATIONS: 0
HEADACHES: 0
NAUSEA: 0
DIARRHEA: 0
SHORTNESS OF BREATH: 0
BRUISES/BLEEDS EASILY: 0
CONSTIPATION: 0
NUMBNESS: 0
FREQUENCY: 0

## 2021-03-25 NOTE — PROGRESS NOTES
Subjective      Patient ID: Zeb Jefferson is a 70 y.o. male.    HPI     Medical management of diabetes, hypertension, hyperlipidemia  Pre visit care team luly performed  A report card has been prepared for today's visit  Following diet - reg exercise    The following have been reviewed and updated as appropriate in this visit:       Review of Systems   Constitutional: Negative for chills, fatigue and unexpected weight change.   Eyes: Negative for visual disturbance (eye exam current).   Respiratory: Negative for cough and shortness of breath.    Cardiovascular: Negative for chest pain, palpitations and leg swelling.   Gastrointestinal: Negative for abdominal pain (rare GERD SX), blood in stool, constipation, diarrhea and nausea.   Endocrine: Negative for cold intolerance and heat intolerance.   Genitourinary: Negative for dysuria, frequency, hematuria and urgency.        1x nocturia   Musculoskeletal: Negative for arthralgias.   Skin: Negative for color change (regular melanoma surveillence).   Neurological: Negative for dizziness, numbness and headaches.   Hematological: Negative for adenopathy. Does not bruise/bleed easily.   Psychiatric/Behavioral: Negative for dysphoric mood and sleep disturbance.       Current Outpatient Medications   Medication Sig Dispense Refill   • aspirin 81 mg enteric coated tablet Take 81 mg by mouth daily.     • metFORMIN (GLUCOPHAGE) 500 mg tablet Take 1 tablet (500 mg total) by mouth daily with dinner. 90 tablet 0   • multivitamin (THERAGRAN) tablet take 1 tablet by oral route  every day     • pantoprazole (PROTONIX) 40 mg EC tablet Take 1 tablet (40 mg total) by mouth once daily. 90 tablet 0   • simvastatin (ZOCOR) 10 mg tablet Take 1 tablet (10 mg total) by mouth nightly. 90 tablet 0   • naproxen (NAPROSYN) 500 mg tablet Take 1 tablet (500 mg total) by mouth 2 (two) times a day with meals. 30 tablet 0   • zolpidem (AMBIEN) 10 mg tablet Take 1 tablet (10 mg total) by mouth  "nightly. 90 tablet 0     No current facility-administered medications for this visit.      Past Medical History:   Diagnosis Date   • Melanoma (CMS/HCC)      No family history on file.  Past Surgical History:   Procedure Laterality Date   • SKIN CANCER DESTRUCTION       Allergies   Allergen Reactions   • Penicillins      Social History     Socioeconomic History   • Marital status:      Spouse name: Not on file   • Number of children: Not on file   • Years of education: Not on file   • Highest education level: Not on file   Occupational History   • Not on file   Social Needs   • Financial resource strain: Not on file   • Food insecurity     Worry: Not on file     Inability: Not on file   • Transportation needs     Medical: Not on file     Non-medical: Not on file   Tobacco Use   • Smoking status: Former Smoker   • Smokeless tobacco: Never Used   Substance and Sexual Activity   • Alcohol use: Yes   • Drug use: No   • Sexual activity: Defer   Lifestyle   • Physical activity     Days per week: Not on file     Minutes per session: Not on file   • Stress: Not on file   Relationships   • Social connections     Talks on phone: Not on file     Gets together: Not on file     Attends [employer] service: Not on file     Active member of club or organization: Not on file     Attends meetings of clubs or organizations: Not on file     Relationship status: Not on file   • Intimate partner violence     Fear of current or ex partner: Not on file     Emotionally abused: Not on file     Physically abused: Not on file     Forced sexual activity: Not on file   Other Topics Concern   • Not on file   Social History Narrative   • Not on file     Vitals:    03/25/21 0954 03/25/21 1007   BP: (!) 132/92 130/70   BP Location: Left upper arm    Patient Position: Sitting    Pulse: (!) 55    Resp: 16    Temp: 36.3 °C (97.3 °F)    TempSrc: Temporal    SpO2: 97%    Weight: 64.4 kg (142 lb)    Height: 1.638 m (5' 4.49\")        Objective "     Physical Exam  Constitutional:       Appearance: Normal appearance. He is well-developed.   HENT:      Head: Normocephalic.   Eyes:      Pupils: Pupils are equal, round, and reactive to light.      Funduscopic exam:     Right eye: No exudate or AV nicking.         Left eye: No exudate or AV nicking.   Neck:      Thyroid: No thyromegaly.      Vascular: No carotid bruit or JVD.   Cardiovascular:      Rate and Rhythm: Normal rate and regular rhythm.      Pulses: Normal pulses.           Dorsalis pedis pulses are 2+ on the right side and 2+ on the left side.        Posterior tibial pulses are 2+ on the right side and 2+ on the left side.      Heart sounds: No murmur.   Pulmonary:      Effort: Pulmonary effort is normal.      Breath sounds: Normal breath sounds.   Abdominal:      General: Bowel sounds are normal.      Palpations: Abdomen is soft. There is no hepatomegaly or splenomegaly.      Tenderness: There is no abdominal tenderness. There is no right CVA tenderness or left CVA tenderness.   Musculoskeletal:      Right foot: No deformity.      Left foot: No deformity.   Feet:      Right foot:      Protective Sensation: 10 sites tested. 10 sites sensed.      Skin integrity: Skin integrity normal.      Left foot:      Protective Sensation: 10 sites tested. 10 sites sensed.      Skin integrity: Skin integrity normal.   Lymphadenopathy:      Cervical: No cervical adenopathy.      Upper Body:      Right upper body: No supraclavicular adenopathy.      Left upper body: No supraclavicular adenopathy.   Skin:     General: Skin is warm and dry.   Neurological:      Mental Status: He is alert and oriented to person, place, and time.      Sensory: No sensory deficit.   Psychiatric:         Mood and Affect: Mood normal.         Speech: Speech normal.         Behavior: Behavior normal.         Cognition and Memory: Cognition normal.         Assessment/Plan   Problem List Items Addressed This Visit        Digestive    Burgos's  esophagus without dysplasia     Asymptomatic - serial endoscopy - due 2022            Endocrine/Metabolic    Mixed hyperlipidemia     GOOD hyperlipidemia control    LDL 85 - goal < 80  CONTINUE CURRENT THERAPY  Recommend low fat diet - Risks and potential complications of hyperlipidemia reviewed  Role of medications,diet, exercise in the treatment of hyperlipidemia discussed   Treatment plan and follow up reviewed and understood by patient         Relevant Orders    Comprehensive metabolic panel    Lipid panel    Type 2 diabetes mellitus without complication, without long-term current use of insulin (CMS/[facility]) - Primary     Good diabetic control.    A1c 6.2  - goal < 6.5  Current medications reviewed.  Recommend diet modifications and weight loss.   Met with patient to review diet, exercise, and glucometer readings  Reviewed medications and the importance of compliance  Diabetes health maintenance plan and follow up was discussed and understood by patient         Relevant Orders    Comprehensive metabolic panel    Hemoglobin A1c    Microalbumin / creatinine urine ratio       Other    Sleep disorder     same meds - long term safety meds discussed               Della Hernandez DO  3/28/2021

## 2021-03-28 ASSESSMENT — ENCOUNTER SYMPTOMS
COLOR CHANGE: 0
DYSPHORIC MOOD: 0
ABDOMINAL PAIN: 0

## 2021-03-28 NOTE — ASSESSMENT & PLAN NOTE
Good diabetic control.    A1c 6.2  - goal < 6.5  Current medications reviewed.  Recommend diet modifications and weight loss.   Met with patient to review diet, exercise, and glucometer readings  Reviewed medications and the importance of compliance  Diabetes health maintenance plan and follow up was discussed and understood by patient

## 2021-03-28 NOTE — ASSESSMENT & PLAN NOTE
GOOD hyperlipidemia control    LDL 85 - goal < 80  CONTINUE CURRENT THERAPY  Recommend low fat diet - Risks and potential complications of hyperlipidemia reviewed  Role of medications,diet, exercise in the treatment of hyperlipidemia discussed   Treatment plan and follow up reviewed and understood by patient

## 2021-05-08 RX ORDER — METFORMIN HYDROCHLORIDE 500 MG/1
TABLET ORAL
Qty: 90 TABLET | Refills: 0 | Status: SHIPPED | OUTPATIENT
Start: 2021-05-08 | End: 2021-06-21 | Stop reason: SDUPTHER

## 2021-05-08 RX ORDER — SIMVASTATIN 10 MG/1
TABLET, FILM COATED ORAL
Qty: 90 TABLET | Refills: 0 | Status: SHIPPED | OUTPATIENT
Start: 2021-05-08 | End: 2021-09-20 | Stop reason: SDUPTHER

## 2021-06-21 RX ORDER — METFORMIN HYDROCHLORIDE 500 MG/1
TABLET ORAL
Qty: 90 TABLET | Refills: 1 | Status: SHIPPED | OUTPATIENT
Start: 2021-06-21 | End: 2021-10-11 | Stop reason: SDUPTHER

## 2021-06-21 NOTE — TELEPHONE ENCOUNTER
Medicine Refill Request    Last Office Visit: 3/25/2021  Last Telemedicine Visit: 4/14/2020 Della Hernandez DO    Next Office Visit: 6/30/2021  Next Telemedicine Visit: Visit date not found         Current Outpatient Medications:   •  aspirin 81 mg enteric coated tablet, Take 81 mg by mouth daily., Disp: , Rfl:   •  metFORMIN (GLUCOPHAGE) 500 mg tablet, TAKE 1 TABLET BY MOUTH EVERY DAY WITH DINNER, Disp: 90 tablet, Rfl: 0  •  multivitamin (THERAGRAN) tablet, take 1 tablet by oral route  every day, Disp: , Rfl:   •  naproxen (NAPROSYN) 500 mg tablet, Take 1 tablet (500 mg total) by mouth 2 (two) times a day with meals., Disp: 30 tablet, Rfl: 0  •  pantoprazole (PROTONIX) 40 mg EC tablet, Take 1 tablet (40 mg total) by mouth once daily., Disp: 90 tablet, Rfl: 0  •  simvastatin (ZOCOR) 10 mg tablet, TAKE 1 TABLET BY MOUTH EVERY DAY AT NIGHT, Disp: 90 tablet, Rfl: 0  •  zolpidem (AMBIEN) 10 mg tablet, Take 1 tablet (10 mg total) by mouth nightly., Disp: 90 tablet, Rfl: 0      BP Readings from Last 3 Encounters:   03/25/21 130/70   01/28/21 122/72   11/11/20 122/68       Recent Lab results:  Lab Results   Component Value Date    CHOL 163 11/02/2020   ,   Lab Results   Component Value Date    HDL 67 11/02/2020   ,   Lab Results   Component Value Date    LDLCALC 85 11/02/2020   ,   Lab Results   Component Value Date    TRIG 52 11/02/2020        Lab Results   Component Value Date    GLUCOSE 104 (H) 03/04/2021   ,   Lab Results   Component Value Date    HGBA1C 6.2 (H) 03/04/2021         Lab Results   Component Value Date    CREATININE 0.73 (L) 03/04/2021       Lab Results   Component Value Date    TSH 0.998 10/27/2015

## 2021-06-22 LAB
ALBUMIN SERPL-MCNC: 4.8 G/DL (ref 3.7–4.7)
ALBUMIN/CREAT UR: <7 MG/G CREAT (ref 0–29)
ALBUMIN/GLOB SERPL: 2.5 {RATIO} (ref 1.2–2.2)
ALP SERPL-CCNC: 56 IU/L (ref 48–121)
ALT SERPL-CCNC: 17 IU/L (ref 0–44)
AST SERPL-CCNC: 16 IU/L (ref 0–40)
BILIRUB SERPL-MCNC: 0.5 MG/DL (ref 0–1.2)
BUN SERPL-MCNC: 15 MG/DL (ref 8–27)
BUN/CREAT SERPL: 18 (ref 10–24)
CALCIUM SERPL-MCNC: 9.9 MG/DL (ref 8.6–10.2)
CHLORIDE SERPL-SCNC: 103 MMOL/L (ref 96–106)
CHOLEST SERPL-MCNC: 200 MG/DL (ref 100–199)
CO2 SERPL-SCNC: 28 MMOL/L (ref 20–29)
CREAT SERPL-MCNC: 0.83 MG/DL (ref 0.76–1.27)
CREAT UR-MCNC: 41.7 MG/DL
GLOBULIN SER CALC-MCNC: 1.9 G/DL (ref 1.5–4.5)
GLUCOSE SERPL-MCNC: 104 MG/DL (ref 65–99)
HBA1C MFR BLD: 6 % (ref 4.8–5.6)
HDLC SERPL-MCNC: 59 MG/DL
LAB CORP EGFR IF AFRICN AM: 102 ML/MIN/1.73
LAB CORP EGFR IF NONAFRICN AM: 89 ML/MIN/1.73
LDLC SERPL CALC-MCNC: 128 MG/DL (ref 0–99)
MICROALBUMIN UR-MCNC: <3 UG/ML
POTASSIUM SERPL-SCNC: 4.6 MMOL/L (ref 3.5–5.2)
PROT SERPL-MCNC: 6.7 G/DL (ref 6–8.5)
SODIUM SERPL-SCNC: 140 MMOL/L (ref 134–144)
TRIGL SERPL-MCNC: 70 MG/DL (ref 0–149)
VLDLC SERPL CALC-MCNC: 13 MG/DL (ref 5–40)

## 2021-06-30 ENCOUNTER — OFFICE VISIT (OUTPATIENT)
Dept: FAMILY MEDICINE | Facility: CLINIC | Age: 71
End: 2021-06-30
Payer: MEDICARE

## 2021-06-30 VITALS
BODY MASS INDEX: 23.76 KG/M2 | HEIGHT: 64 IN | SYSTOLIC BLOOD PRESSURE: 132 MMHG | TEMPERATURE: 98.5 F | OXYGEN SATURATION: 98 % | HEART RATE: 61 BPM | WEIGHT: 139.2 LBS | DIASTOLIC BLOOD PRESSURE: 70 MMHG | RESPIRATION RATE: 16 BRPM

## 2021-06-30 DIAGNOSIS — G47.9 SLEEP DISORDER: ICD-10-CM

## 2021-06-30 DIAGNOSIS — E78.2 MIXED HYPERLIPIDEMIA: ICD-10-CM

## 2021-06-30 DIAGNOSIS — K22.70 BARRETT'S ESOPHAGUS WITHOUT DYSPLASIA: ICD-10-CM

## 2021-06-30 DIAGNOSIS — E11.9 TYPE 2 DIABETES MELLITUS WITHOUT COMPLICATION, WITHOUT LONG-TERM CURRENT USE OF INSULIN (CMS/HCC): Primary | ICD-10-CM

## 2021-06-30 PROCEDURE — 99214 OFFICE O/P EST MOD 30 MIN: CPT | Performed by: FAMILY MEDICINE

## 2021-06-30 ASSESSMENT — ENCOUNTER SYMPTOMS
HEADACHES: 0
NUMBNESS: 0
PALPITATIONS: 0
DYSURIA: 0
SHORTNESS OF BREATH: 0
HEMATURIA: 0
BRUISES/BLEEDS EASILY: 0
UNEXPECTED WEIGHT CHANGE: 0
ARTHRALGIAS: 0
DIZZINESS: 0
CHILLS: 0
SLEEP DISTURBANCE: 1
ADENOPATHY: 0
FREQUENCY: 0
COUGH: 0
DIARRHEA: 0
ABDOMINAL PAIN: 0
CONSTIPATION: 0
FATIGUE: 0
NAUSEA: 0
BLOOD IN STOOL: 0
COLOR CHANGE: 0

## 2021-06-30 NOTE — PROGRESS NOTES
Subjective      Patient ID: Zeb Jefferson is a 70 y.o. male.    HPI     Medical management of diabetes, hypertension, hyperlipidemia  Pre visit care team luly performed  A report card has been prepared for today's visit  Following diet  - yard work/walking    NO LONGER NEED FOLLOW UP WITH DESTINEY POWELL    The following have been reviewed and updated as appropriate in this visit:       Review of Systems   Constitutional: Negative for chills, fatigue and unexpected weight change.   Eyes: Negative for visual disturbance.   Respiratory: Negative for cough and shortness of breath.    Cardiovascular: Negative for chest pain, palpitations and leg swelling.   Gastrointestinal: Negative for abdominal pain, blood in stool, constipation, diarrhea and nausea.   Endocrine: Negative for cold intolerance and heat intolerance.   Genitourinary: Negative for dysuria, frequency, hematuria and urgency.   Musculoskeletal: Negative for arthralgias.   Skin: Negative for color change.   Neurological: Negative for dizziness, numbness and headaches.   Hematological: Negative for adenopathy. Does not bruise/bleed easily.   Psychiatric/Behavioral: Positive for sleep disturbance.       Current Outpatient Medications   Medication Sig Dispense Refill   • aspirin 81 mg enteric coated tablet Take 81 mg by mouth daily.     • metFORMIN (GLUCOPHAGE) 500 mg tablet TAKE 1 TABLET BY MOUTH EVERY DAY WITH DINNER 90 tablet 1   • multivitamin (THERAGRAN) tablet take 1 tablet by oral route  every day     • pantoprazole (PROTONIX) 40 mg EC tablet Take 1 tablet (40 mg total) by mouth once daily. 90 tablet 0   • simvastatin (ZOCOR) 10 mg tablet TAKE 1 TABLET BY MOUTH EVERY DAY AT NIGHT 90 tablet 0   • naproxen (NAPROSYN) 500 mg tablet Take 1 tablet (500 mg total) by mouth 2 (two) times a day with meals. 30 tablet 0   • zolpidem (AMBIEN) 10 mg tablet Take 1 tablet (10 mg total) by mouth nightly. 90 tablet 0     No current facility-administered medications for  "this visit.     Past Medical History:   Diagnosis Date   • Melanoma (CMS/HCC)      History reviewed. No pertinent family history.  Past Surgical History:   Procedure Laterality Date   • SKIN CANCER DESTRUCTION       Allergies   Allergen Reactions   • Penicillins      Social History     Socioeconomic History   • Marital status:      Spouse name: Not on file   • Number of children: Not on file   • Years of education: Not on file   • Highest education level: Not on file   Occupational History   • Not on file   Tobacco Use   • Smoking status: Former Smoker   • Smokeless tobacco: Never Used   Substance and Sexual Activity   • Alcohol use: Yes   • Drug use: No   • Sexual activity: Defer   Other Topics Concern   • Not on file   Social History Narrative   • Not on file     Social Determinants of Health     Financial Resource Strain:    • Difficulty of Paying Living Expenses:    Food Insecurity:    • Worried About Running Out of Food in the Last Year:    • Ran Out of Food in the Last Year:    Transportation Needs:    • Lack of Transportation (Medical):    • Lack of Transportation (Non-Medical):    Physical Activity:    • Days of Exercise per Week:    • Minutes of Exercise per Session:    Stress:    • Feeling of Stress :    Social Connections:    • Frequency of Communication with Friends and Family:    • Frequency of Social Gatherings with Friends and Family:    • Attends [employer] Services:    • Active Member of Clubs or Organizations:    • Attends Club or Organization Meetings:    • Marital Status:    Intimate Partner Violence:    • Fear of Current or Ex-Partner:    • Emotionally Abused:    • Physically Abused:    • Sexually Abused:      Vitals:    06/30/21 0836 06/30/21 0854   BP: (!) 142/80 132/70   BP Location: Left upper arm    Patient Position: Sitting    Pulse: 61    Resp: 16    Temp: 36.9 °C (98.5 °F)    TempSrc: Oral    SpO2: 98%    Weight: 63.1 kg (139 lb 3.2 oz)    Height: 1.638 m (5' 4.49\")      Objective "     Physical Exam  Constitutional:       Appearance: Normal appearance. He is well-developed.   HENT:      Head: Normocephalic.   Eyes:      Pupils: Pupils are equal, round, and reactive to light.      Funduscopic exam:     Right eye: No exudate or AV nicking.         Left eye: No exudate or AV nicking.   Neck:      Thyroid: No thyromegaly.      Vascular: No carotid bruit or JVD.   Cardiovascular:      Rate and Rhythm: Normal rate and regular rhythm.      Pulses: Normal pulses.      Heart sounds: No murmur heard.     Pulmonary:      Effort: Pulmonary effort is normal.      Breath sounds: Normal breath sounds.   Abdominal:      General: Bowel sounds are normal.      Palpations: Abdomen is soft. There is no hepatomegaly or splenomegaly.      Tenderness: There is no abdominal tenderness. There is no right CVA tenderness or left CVA tenderness.   Musculoskeletal:      Right lower leg: No edema.      Left lower leg: No edema.      Right foot: No deformity.      Left foot: No deformity.   Feet:      Right foot:      Protective Sensation: 10 sites tested. 10 sites sensed.      Skin integrity: Skin integrity normal.      Left foot:      Protective Sensation: 10 sites tested. 10 sites sensed.      Skin integrity: Skin integrity normal.   Lymphadenopathy:      Cervical: No cervical adenopathy.   Skin:     General: Skin is warm and dry.   Neurological:      Mental Status: He is alert and oriented to person, place, and time.      Sensory: No sensory deficit.   Psychiatric:         Mood and Affect: Mood normal.         Speech: Speech normal.         Behavior: Behavior normal.         Cognition and Memory: Cognition normal.         Assessment/Plan   Problem List Items Addressed This Visit        Digestive    Burgos's esophagus without dysplasia     Asymptomatic - serial endoscopy - due 2022            Endocrine/Metabolic    Mixed hyperlipidemia     FAIR hyperlipidemia control     - goal < 80  CONTINUE CURRENT THERAPY  -   REINFORCED DIET  Recommend low fat diet - Risks and potential complications of hyperlipidemia reviewed  Role of medications,diet, exercise in the treatment of hyperlipidemia discussed   Treatment plan and follow up reviewed and understood by patient         Type 2 diabetes mellitus without complication, without long-term current use of insulin (CMS/[facility]) - Primary     Good diabetic control.    A1c 6.0  - goal < 6.5  Current medications reviewed.  Recommend diet modifications and weight loss.   Met with patient to review diet, exercise, and glucometer readings  Reviewed medications and the importance of compliance  Diabetes health maintenance plan and follow up was discussed and understood by patient            Other    Sleep disorder     same meds - long term safety meds discussed               Della Hernandez DO  7/4/2021

## 2021-07-04 NOTE — ASSESSMENT & PLAN NOTE
Good diabetic control.    A1c 6.0  - goal < 6.5  Current medications reviewed.  Recommend diet modifications and weight loss.   Met with patient to review diet, exercise, and glucometer readings  Reviewed medications and the importance of compliance  Diabetes health maintenance plan and follow up was discussed and understood by patient

## 2021-07-04 NOTE — ASSESSMENT & PLAN NOTE
FAIR hyperlipidemia control     - goal < 80  CONTINUE CURRENT THERAPY  -  REINFORCED DIET  Recommend low fat diet - Risks and potential complications of hyperlipidemia reviewed  Role of medications,diet, exercise in the treatment of hyperlipidemia discussed   Treatment plan and follow up reviewed and understood by patient

## 2021-07-20 ENCOUNTER — TELEPHONE (OUTPATIENT)
Dept: FAMILY MEDICINE | Facility: CLINIC | Age: 71
End: 2021-07-20

## 2021-07-20 NOTE — TELEPHONE ENCOUNTER
Left message for patient to schedule his pre-op needed for cataract surgery scheduled for 9/28/21. - no testing kelley faust

## 2021-08-09 RX ORDER — PANTOPRAZOLE SODIUM 40 MG/1
40 TABLET, DELAYED RELEASE ORAL
Qty: 90 TABLET | Refills: 0 | Status: SHIPPED | OUTPATIENT
Start: 2021-08-09 | End: 2021-11-17 | Stop reason: SDUPTHER

## 2021-08-09 NOTE — TELEPHONE ENCOUNTER
Medicine Refill Request    Query RX pantoprazole     ast Office Visit: 6/30/2021  Last Telemedicine Visit: 4/14/2020 Della Hernandez DO    Next Office Visit: 9/15/2021  Next Telemedicine Visit: Visit date not found         Current Outpatient Medications:   •  aspirin 81 mg enteric coated tablet, Take 81 mg by mouth daily., Disp: , Rfl:   •  metFORMIN (GLUCOPHAGE) 500 mg tablet, TAKE 1 TABLET BY MOUTH EVERY DAY WITH DINNER, Disp: 90 tablet, Rfl: 1  •  multivitamin (THERAGRAN) tablet, take 1 tablet by oral route  every day, Disp: , Rfl:   •  naproxen (NAPROSYN) 500 mg tablet, Take 1 tablet (500 mg total) by mouth 2 (two) times a day with meals., Disp: 30 tablet, Rfl: 0  •  pantoprazole (PROTONIX) 40 mg EC tablet, Take 1 tablet (40 mg total) by mouth once daily., Disp: 90 tablet, Rfl: 0  •  simvastatin (ZOCOR) 10 mg tablet, TAKE 1 TABLET BY MOUTH EVERY DAY AT NIGHT, Disp: 90 tablet, Rfl: 0  •  zolpidem (AMBIEN) 10 mg tablet, Take 1 tablet (10 mg total) by mouth nightly., Disp: 90 tablet, Rfl: 0      BP Readings from Last 3 Encounters:   06/30/21 132/70   03/25/21 130/70   01/28/21 122/72       Recent Lab results:  Lab Results   Component Value Date    CHOL 200 (H) 06/21/2021   ,   Lab Results   Component Value Date    HDL 59 06/21/2021   ,   Lab Results   Component Value Date    LDLCALC 128 (H) 06/21/2021   ,   Lab Results   Component Value Date    TRIG 70 06/21/2021        Lab Results   Component Value Date    GLUCOSE 104 (H) 06/21/2021   ,   Lab Results   Component Value Date    HGBA1C 6.0 (H) 06/21/2021         Lab Results   Component Value Date    CREATININE 0.83 06/21/2021       Lab Results   Component Value Date    TSH 0.998 10/27/2015

## 2021-09-15 ENCOUNTER — CONSULT (OUTPATIENT)
Dept: FAMILY MEDICINE | Facility: CLINIC | Age: 71
End: 2021-09-15
Payer: MEDICARE

## 2021-09-15 VITALS
BODY MASS INDEX: 23.56 KG/M2 | TEMPERATURE: 98.2 F | OXYGEN SATURATION: 97 % | DIASTOLIC BLOOD PRESSURE: 68 MMHG | SYSTOLIC BLOOD PRESSURE: 130 MMHG | RESPIRATION RATE: 16 BRPM | HEIGHT: 64 IN | HEART RATE: 63 BPM | WEIGHT: 138 LBS

## 2021-09-15 DIAGNOSIS — K22.70 BARRETT'S ESOPHAGUS WITHOUT DYSPLASIA: ICD-10-CM

## 2021-09-15 DIAGNOSIS — E78.2 MIXED HYPERLIPIDEMIA: ICD-10-CM

## 2021-09-15 DIAGNOSIS — H25.13 AGE-RELATED NUCLEAR CATARACT OF BOTH EYES: Primary | ICD-10-CM

## 2021-09-15 DIAGNOSIS — E11.9 TYPE 2 DIABETES MELLITUS WITHOUT COMPLICATION, WITHOUT LONG-TERM CURRENT USE OF INSULIN (CMS/HCC): ICD-10-CM

## 2021-09-15 PROCEDURE — 99214 OFFICE O/P EST MOD 30 MIN: CPT | Performed by: FAMILY MEDICINE

## 2021-09-15 NOTE — PROGRESS NOTES
Subjective      Patient ID: Zeb Jefferson is a 71 y.o. male.    HPI     Progressive decline [facility]     Unable to drive at night   VF done      The following have been reviewed and updated as appropriate in this visit:       Review of Systems   Constitutional: Negative for chills, fatigue and unexpected weight change.   HENT: Negative for dental problem, hearing loss, sinus pressure, sinus pain, sore throat and voice change.    Eyes: Positive for visual disturbance.   Respiratory: Negative for cough, chest tightness and shortness of breath.    Cardiovascular: Negative for chest pain, palpitations and leg swelling.   Gastrointestinal: Negative for abdominal pain, blood in stool, constipation, diarrhea and nausea.   Endocrine: Negative for cold intolerance and heat intolerance.   Genitourinary: Negative for dysuria, frequency, hematuria and urgency.   Musculoskeletal: Negative for arthralgias.   Skin: Negative for color change and rash.   Neurological: Negative for dizziness, numbness and headaches.   Hematological: Negative for adenopathy. Does not bruise/bleed easily.   Psychiatric/Behavioral: Negative for sleep disturbance.       Current Outpatient Medications   Medication Sig Dispense Refill   • metFORMIN (GLUCOPHAGE) 500 mg tablet TAKE 1 TABLET BY MOUTH EVERY DAY WITH DINNER 90 tablet 1   • multivitamin (THERAGRAN) tablet take 1 tablet by oral route  every day     • pantoprazole (PROTONIX) 40 mg EC tablet Take 1 tablet (40 mg total) by mouth once daily. 90 tablet 0   • simvastatin (ZOCOR) 10 mg tablet TAKE 1 TABLET BY MOUTH EVERY DAY AT NIGHT 90 tablet 0   • zolpidem (AMBIEN) 10 mg tablet Take 1 tablet (10 mg total) by mouth nightly. 90 tablet 0   • aspirin 81 mg enteric coated tablet Take 81 mg by mouth daily.     • naproxen (NAPROSYN) 500 mg tablet Take 1 tablet (500 mg total) by mouth 2 (two) times a day with meals. 30 tablet 0     No current facility-administered medications for this visit.     Past Medical  History:   Diagnosis Date   • Melanoma (CMS/HCC)      History reviewed. No pertinent family history.  Past Surgical History:   Procedure Laterality Date   • SKIN CANCER DESTRUCTION       Allergies   Allergen Reactions   • Penicillins      Social History     Socioeconomic History   • Marital status:      Spouse name: Not on file   • Number of children: Not on file   • Years of education: Not on file   • Highest education level: Not on file   Occupational History   • Not on file   Tobacco Use   • Smoking status: Former Smoker   • Smokeless tobacco: Never Used   Substance and Sexual Activity   • Alcohol use: Yes   • Drug use: No   • Sexual activity: Defer   Other Topics Concern   • Not on file   Social History Narrative   • Not on file     Social Determinants of Health     Financial Resource Strain:    • Difficulty of Paying Living Expenses: Not on file   Food Insecurity:    • Worried About Running Out of Food in the Last Year: Not on file   • Ran Out of Food in the Last Year: Not on file   Transportation Needs:    • Lack of Transportation (Medical): Not on file   • Lack of Transportation (Non-Medical): Not on file   Physical Activity:    • Days of Exercise per Week: Not on file   • Minutes of Exercise per Session: Not on file   Stress:    • Feeling of Stress : Not on file   Social Connections:    • Frequency of Communication with Friends and Family: Not on file   • Frequency of Social Gatherings with Friends and Family: Not on file   • Attends Christianity Services: Not on file   • Active Member of Clubs or Organizations: Not on file   • Attends Club or Organization Meetings: Not on file   • Marital Status: Not on file   Intimate Partner Violence:    • Fear of Current or Ex-Partner: Not on file   • Emotionally Abused: Not on file   • Physically Abused: Not on file   • Sexually Abused: Not on file   Housing Stability:    • Unable to Pay for Housing in the Last Year: Not on file   • Number of Places Lived in the  "Last Year: Not on file   • Unstable Housing in the Last Year: Not on file     Vitals:    09/15/21 0924 09/15/21 0944   BP: (!) 144/80 130/68   BP Location: Left upper arm    Patient Position: Sitting    Pulse: 63    Resp: 16    Temp: 36.8 °C (98.2 °F)    TempSrc: Oral    SpO2: 97%    Weight: 62.6 kg (138 lb)    Height: 1.638 m (5' 4.49\")      Objective     Physical Exam  Constitutional:       Appearance: Normal appearance. He is well-developed.   HENT:      Head: Normocephalic.      Right Ear: Tympanic membrane and external ear normal.      Left Ear: Tympanic membrane and external ear normal.      Nose: Nose normal.      Mouth/Throat:      Mouth: Mucous membranes are moist.      Pharynx: Oropharynx is clear. No oropharyngeal exudate.   Eyes:      Conjunctiva/sclera: Conjunctivae normal.      Pupils: Pupils are equal, round, and reactive to light.      Funduscopic exam:     Right eye: No AV nicking.         Left eye: No AV nicking.      Comments: bilat nuclear cataract L>R   Neck:      Thyroid: No thyromegaly.      Vascular: No carotid bruit.   Cardiovascular:      Rate and Rhythm: Normal rate and regular rhythm.      Pulses: Normal pulses.      Heart sounds: Normal heart sounds. No murmur heard.      Pulmonary:      Effort: Pulmonary effort is normal.      Breath sounds: Normal breath sounds.   Chest:   Breasts:      Right: No supraclavicular adenopathy.      Left: No supraclavicular adenopathy.       Abdominal:      General: Bowel sounds are normal.      Palpations: Abdomen is soft. There is no hepatomegaly or splenomegaly.      Tenderness: There is no abdominal tenderness. There is no right CVA tenderness or left CVA tenderness.   Musculoskeletal:      Right lower leg: No edema.      Left lower leg: No edema.   Lymphadenopathy:      Cervical: No cervical adenopathy.      Upper Body:      Right upper body: No supraclavicular adenopathy.      Left upper body: No supraclavicular adenopathy.   Skin:     General: Skin " is warm and dry.   Neurological:      Mental Status: He is alert and oriented to person, place, and time.      Sensory: No sensory deficit.   Psychiatric:         Mood and Affect: Mood normal.         Speech: Speech normal.         Behavior: Behavior normal.         Assessment/Plan   Problem List Items Addressed This Visit        Digestive    Burgos's esophagus without dysplasia     Asymptomatic - serial endoscopy - due 2022  Continue PPI            Endocrine/Metabolic    Mixed hyperlipidemia     FAIR hyperlipidemia control     - goal < 80  CONTINUE CURRENT THERAPY  -  REINFORCED DIET  Recommend low fat diet - Risks and potential complications of hyperlipidemia reviewed  Role of medications,diet, exercise in the treatment of hyperlipidemia discussed   Treatment plan and follow up reviewed and understood by patient         Type 2 diabetes mellitus without complication, without long-term current use of insulin (CMS/[facility])     Good diabetic control.    A1c 6.0  - goal < 6.5  Current medications reviewed.  Recommend diet modifications and weight loss.   Met with patient to review diet, exercise, and glucometer readings  Reviewed medications and the importance of compliance  Diabetes health maintenance plan and follow up was discussed and understood by patient           Other Visit Diagnoses     Age-related nuclear cataract of both eyes    -  Primary          Della Hernandez DO  9/19/2021

## 2021-09-19 ASSESSMENT — ENCOUNTER SYMPTOMS
ADENOPATHY: 0
NUMBNESS: 0
ABDOMINAL PAIN: 0
NAUSEA: 0
SORE THROAT: 0
CHILLS: 0
FATIGUE: 0
SLEEP DISTURBANCE: 0
DIZZINESS: 0
VOICE CHANGE: 0
COUGH: 0
SINUS PRESSURE: 0
CONSTIPATION: 0
COLOR CHANGE: 0
PALPITATIONS: 0
SHORTNESS OF BREATH: 0
HEADACHES: 0
BRUISES/BLEEDS EASILY: 0
SINUS PAIN: 0
HEMATURIA: 0
CHEST TIGHTNESS: 0
DIARRHEA: 0
UNEXPECTED WEIGHT CHANGE: 0
ARTHRALGIAS: 0
FREQUENCY: 0
DYSURIA: 0
BLOOD IN STOOL: 0

## 2021-09-20 RX ORDER — SIMVASTATIN 10 MG/1
10 TABLET, FILM COATED ORAL NIGHTLY
Qty: 90 TABLET | Refills: 0 | Status: SHIPPED | OUTPATIENT
Start: 2021-09-20 | End: 2022-01-03 | Stop reason: SDUPTHER

## 2021-09-20 NOTE — TELEPHONE ENCOUNTER
Refill simvastatin    Medicine Refill Request    Last Office Visit: 6/30/2021  Last Telemedicine Visit: 4/14/2020 Della Hernandez DO    Next Office Visit: 11/17/2021  Next Telemedicine Visit: Visit date not found         Current Outpatient Medications:   •  aspirin 81 mg enteric coated tablet, Take 81 mg by mouth daily., Disp: , Rfl:   •  metFORMIN (GLUCOPHAGE) 500 mg tablet, TAKE 1 TABLET BY MOUTH EVERY DAY WITH DINNER, Disp: 90 tablet, Rfl: 1  •  multivitamin (THERAGRAN) tablet, take 1 tablet by oral route  every day, Disp: , Rfl:   •  naproxen (NAPROSYN) 500 mg tablet, Take 1 tablet (500 mg total) by mouth 2 (two) times a day with meals., Disp: 30 tablet, Rfl: 0  •  pantoprazole (PROTONIX) 40 mg EC tablet, Take 1 tablet (40 mg total) by mouth once daily., Disp: 90 tablet, Rfl: 0  •  simvastatin (ZOCOR) 10 mg tablet, TAKE 1 TABLET BY MOUTH EVERY DAY AT NIGHT, Disp: 90 tablet, Rfl: 0  •  zolpidem (AMBIEN) 10 mg tablet, Take 1 tablet (10 mg total) by mouth nightly., Disp: 90 tablet, Rfl: 0      BP Readings from Last 3 Encounters:   09/15/21 130/68   06/30/21 132/70   03/25/21 130/70       Recent Lab results:  Lab Results   Component Value Date    CHOL 200 (H) 06/21/2021   ,   Lab Results   Component Value Date    HDL 59 06/21/2021   ,   Lab Results   Component Value Date    LDLCALC 128 (H) 06/21/2021   ,   Lab Results   Component Value Date    TRIG 70 06/21/2021        Lab Results   Component Value Date    GLUCOSE 104 (H) 06/21/2021   ,   Lab Results   Component Value Date    HGBA1C 6.0 (H) 06/21/2021         Lab Results   Component Value Date    CREATININE 0.83 06/21/2021       Lab Results   Component Value Date    TSH 0.998 10/27/2015

## 2021-10-11 RX ORDER — METFORMIN HYDROCHLORIDE 500 MG/1
TABLET ORAL
Qty: 90 TABLET | Refills: 1 | Status: SHIPPED | OUTPATIENT
Start: 2021-10-11 | End: 2022-05-09 | Stop reason: SDUPTHER

## 2021-10-11 NOTE — TELEPHONE ENCOUNTER
Refill metformin  Medicine Refill Request    Last Office Visit: 6/30/2021  Last Telemedicine Visit: 4/14/2020 Della Hernandez DO    Next Office Visit: 11/17/2021  Next Telemedicine Visit: Visit date not found         Current Outpatient Medications:   •  aspirin 81 mg enteric coated tablet, Take 81 mg by mouth daily., Disp: , Rfl:   •  metFORMIN (GLUCOPHAGE) 500 mg tablet, TAKE 1 TABLET BY MOUTH EVERY DAY WITH DINNER, Disp: 90 tablet, Rfl: 1  •  multivitamin (THERAGRAN) tablet, take 1 tablet by oral route  every day, Disp: , Rfl:   •  naproxen (NAPROSYN) 500 mg tablet, Take 1 tablet (500 mg total) by mouth 2 (two) times a day with meals., Disp: 30 tablet, Rfl: 0  •  pantoprazole (PROTONIX) 40 mg EC tablet, Take 1 tablet (40 mg total) by mouth once daily., Disp: 90 tablet, Rfl: 0  •  simvastatin (ZOCOR) 10 mg tablet, Take 1 tablet (10 mg total) by mouth nightly., Disp: 90 tablet, Rfl: 0  •  zolpidem (AMBIEN) 10 mg tablet, Take 1 tablet (10 mg total) by mouth nightly., Disp: 90 tablet, Rfl: 0      BP Readings from Last 3 Encounters:   09/15/21 130/68   06/30/21 132/70   03/25/21 130/70       Recent Lab results:  Lab Results   Component Value Date    CHOL 200 (H) 06/21/2021   ,   Lab Results   Component Value Date    HDL 59 06/21/2021   ,   Lab Results   Component Value Date    LDLCALC 128 (H) 06/21/2021   ,   Lab Results   Component Value Date    TRIG 70 06/21/2021        Lab Results   Component Value Date    GLUCOSE 104 (H) 06/21/2021   ,   Lab Results   Component Value Date    HGBA1C 6.0 (H) 06/21/2021         Lab Results   Component Value Date    CREATININE 0.83 06/21/2021       Lab Results   Component Value Date    TSH 0.998 10/27/2015

## 2021-11-13 LAB
ALBUMIN/CREAT UR: 6 MG/G CREAT (ref 0–29)
BASOPHILS # BLD AUTO: 0.1 X10E3/UL (ref 0–0.2)
BASOPHILS NFR BLD AUTO: 1 %
BUN SERPL-MCNC: 9 MG/DL (ref 8–27)
BUN/CREAT SERPL: 11 (ref 10–24)
CALCIUM SERPL-MCNC: 9.7 MG/DL (ref 8.6–10.2)
CHLORIDE SERPL-SCNC: 102 MMOL/L (ref 96–106)
CO2 SERPL-SCNC: 27 MMOL/L (ref 20–29)
CREAT SERPL-MCNC: 0.8 MG/DL (ref 0.76–1.27)
CREAT UR-MCNC: 96 MG/DL
EOSINOPHIL # BLD AUTO: 0.2 X10E3/UL (ref 0–0.4)
EOSINOPHIL NFR BLD AUTO: 2 %
ERYTHROCYTE [DISTWIDTH] IN BLOOD BY AUTOMATED COUNT: 13.4 % (ref 11.6–15.4)
GLUCOSE SERPL-MCNC: 105 MG/DL (ref 65–99)
HCT VFR BLD AUTO: 42.9 % (ref 37.5–51)
HGB BLD-MCNC: 14.2 G/DL (ref 13–17.7)
IMM GRANULOCYTES # BLD AUTO: 0 X10E3/UL (ref 0–0.1)
IMM GRANULOCYTES NFR BLD AUTO: 0 %
LAB CORP EGFR IF AFRICN AM: 103 ML/MIN/1.73
LAB CORP EGFR IF NONAFRICN AM: 89 ML/MIN/1.73
LYMPHOCYTES # BLD AUTO: 3 X10E3/UL (ref 0.7–3.1)
LYMPHOCYTES NFR BLD AUTO: 32 %
MCH RBC QN AUTO: 29.2 PG (ref 26.6–33)
MCHC RBC AUTO-ENTMCNC: 33.1 G/DL (ref 31.5–35.7)
MCV RBC AUTO: 88 FL (ref 79–97)
MICROALBUMIN UR-MCNC: 5.3 UG/ML
MONOCYTES # BLD AUTO: 0.6 X10E3/UL (ref 0.1–0.9)
MONOCYTES NFR BLD AUTO: 6 %
NEUTROPHILS # BLD AUTO: 5.5 X10E3/UL (ref 1.4–7)
NEUTROPHILS NFR BLD AUTO: 59 %
PLATELET # BLD AUTO: 341 X10E3/UL (ref 150–450)
POTASSIUM SERPL-SCNC: 4.5 MMOL/L (ref 3.5–5.2)
RBC # BLD AUTO: 4.86 X10E6/UL (ref 4.14–5.8)
SODIUM SERPL-SCNC: 142 MMOL/L (ref 134–144)
WBC # BLD AUTO: 9.3 X10E3/UL (ref 3.4–10.8)

## 2021-11-17 ENCOUNTER — OFFICE VISIT (OUTPATIENT)
Dept: FAMILY MEDICINE | Facility: CLINIC | Age: 71
End: 2021-11-17
Payer: MEDICARE

## 2021-11-17 VITALS
SYSTOLIC BLOOD PRESSURE: 122 MMHG | HEIGHT: 63 IN | RESPIRATION RATE: 16 BRPM | DIASTOLIC BLOOD PRESSURE: 70 MMHG | HEART RATE: 60 BPM | TEMPERATURE: 97.5 F | BODY MASS INDEX: 24.7 KG/M2 | WEIGHT: 139.4 LBS | OXYGEN SATURATION: 99 %

## 2021-11-17 DIAGNOSIS — R35.1 NOCTURIA: ICD-10-CM

## 2021-11-17 DIAGNOSIS — Z85.820 HX OF MALIGNANT MELANOMA: ICD-10-CM

## 2021-11-17 DIAGNOSIS — E78.2 MIXED HYPERLIPIDEMIA: ICD-10-CM

## 2021-11-17 DIAGNOSIS — E11.9 TYPE 2 DIABETES MELLITUS WITHOUT COMPLICATION, WITHOUT LONG-TERM CURRENT USE OF INSULIN (CMS/HCC): ICD-10-CM

## 2021-11-17 DIAGNOSIS — Z23 NEED FOR INFLUENZA VACCINATION: ICD-10-CM

## 2021-11-17 DIAGNOSIS — K22.70 BARRETT'S ESOPHAGUS WITHOUT DYSPLASIA: ICD-10-CM

## 2021-11-17 DIAGNOSIS — Z00.00 MEDICARE ANNUAL WELLNESS VISIT, SUBSEQUENT: Primary | ICD-10-CM

## 2021-11-17 PROCEDURE — G0008 ADMIN INFLUENZA VIRUS VAC: HCPCS | Performed by: FAMILY MEDICINE

## 2021-11-17 PROCEDURE — 93000 ELECTROCARDIOGRAM COMPLETE: CPT | Performed by: FAMILY MEDICINE

## 2021-11-17 PROCEDURE — G0439 PPPS, SUBSEQ VISIT: HCPCS | Performed by: FAMILY MEDICINE

## 2021-11-17 PROCEDURE — 90694 VACC AIIV4 NO PRSRV 0.5ML IM: CPT | Performed by: FAMILY MEDICINE

## 2021-11-17 PROCEDURE — 99999 PR OFFICE/OUTPT VISIT,PROCEDURE ONLY: CPT | Mod: 25 | Performed by: FAMILY MEDICINE

## 2021-11-17 RX ORDER — PANTOPRAZOLE SODIUM 40 MG/1
40 TABLET, DELAYED RELEASE ORAL
Qty: 90 TABLET | Refills: 0 | Status: SHIPPED | OUTPATIENT
Start: 2021-11-17 | End: 2022-03-07 | Stop reason: SDUPTHER

## 2021-11-17 ASSESSMENT — MINI COG
COMPLETED: YES
TOTAL SCORE: 5

## 2021-11-17 ASSESSMENT — PATIENT HEALTH QUESTIONNAIRE - PHQ9: SUM OF ALL RESPONSES TO PHQ9 QUESTIONS 1 & 2: 0

## 2021-11-17 NOTE — PROGRESS NOTES
Subjective      Patient ID: Zeb Jefferson is a 71 y.o. male.    HPI    Generally well  Following diet - walking for exercise  NO concerns  STOPPED ZOLPIDEM    The following have been reviewed and updated as appropriate in this visit:       Review of Systems   Constitutional: Negative for chills, fatigue and unexpected weight change.   HENT: Negative for dental problem, hearing loss, sinus pressure, sinus pain, sore throat and voice change.    Eyes: Positive for visual disturbance (eye exam current).   Respiratory: Negative for cough, chest tightness and shortness of breath.    Cardiovascular: Negative for chest pain, palpitations and leg swelling.   Gastrointestinal: Negative for abdominal pain, blood in stool, constipation, diarrhea and nausea.   Endocrine: Negative for cold intolerance and heat intolerance.   Genitourinary: Negative for dysuria, frequency, hematuria and urgency.   Musculoskeletal: Negative for arthralgias.   Skin: Negative for color change (reg f/u with derm) and rash.   Neurological: Negative for dizziness, numbness and headaches.   Hematological: Negative for adenopathy. Does not bruise/bleed easily.   Psychiatric/Behavioral: Negative for sleep disturbance.       Current Outpatient Medications   Medication Sig Dispense Refill   • metFORMIN (GLUCOPHAGE) 500 mg tablet TAKE 1 TABLET BY MOUTH EVERY DAY WITH DINNER 90 tablet 1   • multivitamin (THERAGRAN) tablet take 1 tablet by oral route  every day     • pantoprazole 40 mg EC tablet Take 1 tablet (40 mg total) by mouth once daily. 90 tablet 0   • simvastatin (ZOCOR) 10 mg tablet Take 1 tablet (10 mg total) by mouth nightly. 90 tablet 0   • aspirin 81 mg enteric coated tablet Take 81 mg by mouth daily.     • naproxen (NAPROSYN) 500 mg tablet Take 1 tablet (500 mg total) by mouth 2 (two) times a day with meals. 30 tablet 0     No current facility-administered medications for this visit.     Past Medical History:   Diagnosis Date   • Melanoma  (CMS/[facility])      History reviewed. No pertinent family history.  Past Surgical History:   Procedure Laterality Date   • SKIN CANCER DESTRUCTION       Allergies   Allergen Reactions   • Penicillins      Social History     Socioeconomic History   • Marital status:      Spouse name: Not on file   • Number of children: Not on file   • Years of education: Not on file   • Highest education level: Not on file   Occupational History   • Not on file   Tobacco Use   • Smoking status: Former Smoker   • Smokeless tobacco: Never Used   Substance and Sexual Activity   • Alcohol use: Yes   • Drug use: No   • Sexual activity: Defer   Other Topics Concern   • Not on file   Social History Narrative   • Not on file     Social Determinants of Health     Financial Resource Strain:    • Difficulty of Paying Living Expenses: Not on file   Food Insecurity:    • Worried About Running Out of Food in the Last Year: Not on file   • Ran Out of Food in the Last Year: Not on file   Transportation Needs:    • Lack of Transportation (Medical): Not on file   • Lack of Transportation (Non-Medical): Not on file   Physical Activity:    • Days of Exercise per Week: Not on file   • Minutes of Exercise per Session: Not on file   Stress:    • Feeling of Stress : Not on file   Social Connections:    • Frequency of Communication with Friends and Family: Not on file   • Frequency of Social Gatherings with Friends and Family: Not on file   • Attends [employer]ism Services: Not on file   • Active Member of Clubs or Organizations: Not on file   • Attends Club or Organization Meetings: Not on file   • Marital Status: Not on file   Intimate Partner Violence:    • Fear of Current or Ex-Partner: Not on file   • Emotionally Abused: Not on file   • Physically Abused: Not on file   • Sexually Abused: Not on file   Housing Stability:    • Unable to Pay for Housing in the Last Year: Not on file   • Number of Places Lived in the Last Year: Not on file   • Unstable  "Housing in the Last Year: Not on file     Vitals:    11/17/21 1051 11/17/21 1116   BP: 132/76 122/70   BP Location: Left upper arm    Patient Position: Sitting    Pulse: 60    Resp: 16    Temp: 36.4 °C (97.5 °F)    TempSrc: Oral    SpO2: 99%    Weight: 63.2 kg (139 lb 6.4 oz)    Height: 1.602 m (5' 3.09\")        Objective     Physical Exam  Constitutional:       Appearance: Normal appearance. He is well-developed.   HENT:      Head: Normocephalic.      Right Ear: Tympanic membrane and external ear normal.      Left Ear: Tympanic membrane and external ear normal.      Nose: Nose normal.      Mouth/Throat:      Mouth: Mucous membranes are moist.      Pharynx: Oropharynx is clear. No oropharyngeal exudate.   Eyes:      General: Lids are normal. Lids are everted, no foreign bodies appreciated.      Conjunctiva/sclera: Conjunctivae normal.      Pupils: Pupils are equal, round, and reactive to light.      Funduscopic exam:     Right eye: No AV nicking.         Left eye: No AV nicking.   Neck:      Thyroid: No thyromegaly.      Vascular: Carotid bruit (transmitted) present.   Cardiovascular:      Rate and Rhythm: Normal rate and regular rhythm.      Pulses: Normal pulses.      Heart sounds: Murmur heard.    Systolic murmur is present with a grade of 2/6.      Pulmonary:      Effort: Pulmonary effort is normal.      Breath sounds: Normal breath sounds.   Abdominal:      General: Bowel sounds are normal.      Palpations: Abdomen is soft. There is no hepatomegaly or splenomegaly.      Tenderness: There is no abdominal tenderness. There is no right CVA tenderness or left CVA tenderness.      Hernia: There is no hernia in the left inguinal area or right inguinal area.   Genitourinary:     Prostate: Normal.      Rectum: Normal.   Musculoskeletal:      Right lower leg: No edema.      Left lower leg: No edema.   Lymphadenopathy:      Cervical: No cervical adenopathy.   Skin:     General: Skin is warm and dry.   Neurological:      " Mental Status: He is alert and oriented to person, place, and time.      Sensory: No sensory deficit.   Psychiatric:         Mood and Affect: Mood normal.         Speech: Speech normal.         Behavior: Behavior normal.         Assessment/Plan   Problem List Items Addressed This Visit        Digestive    Burgos's esophagus without dysplasia     Asymptomatic - serial endoscopy - due 2022  Continue PPI         Relevant Medications    pantoprazole 40 mg EC tablet       Genitourinary    Nocturia     Prostate cancer screening discussed  - check PSA         Relevant Orders    PSA       Endocrine/Metabolic    Mixed hyperlipidemia     FAIR hyperlipidemia control     - goal < 80  CONTINUE CURRENT THERAPY  -  REINFORCED DIET  Recommend low fat diet - Risks and potential complications of hyperlipidemia reviewed  Role of medications,diet, exercise in the treatment of hyperlipidemia discussed   Treatment plan and follow up reviewed and understood by patient         Relevant Orders    Lipid panel    TSH 3rd Generation    ECG 12 LEAD OFFICE PERFORMED (Completed)    Type 2 diabetes mellitus without complication, without long-term current use of insulin (CMS/[facility])     Good diabetic control.    A1c 6.2  - goal < 6.5  Current medications reviewed.  Recommend diet modifications and weight loss.   Met with patient to review diet, exercise, and glucometer readings  Reviewed medications and the importance of compliance  Diabetes health maintenance plan and follow up was discussed and understood by patient         Relevant Orders    Comprehensive metabolic panel    Hemoglobin A1c    ECG 12 LEAD OFFICE PERFORMED (Completed)       Other    Hx of malignant melanoma     SHARRON           Other Visit Diagnoses     Medicare annual wellness visit, subsequent    -  Primary    Need for influenza vaccination        Relevant Orders    Influenza vaccine 65 and older IM preservative free          Della Hernandez DO  11/21/2021

## 2021-11-17 NOTE — PROGRESS NOTES
Subjective     Zeb Jefferson is a 71 y.o. male who presents for a subsequent annual wellness visit.     Patient Care Team:  Della Hernandez DO as PCP - General  Zeynep Shah RN as Care Coordinator  Ernst Valdivia MD as Referring Physician  Jacob Greenwood MD as Referring Physician (Ophthalmology)  Silvano Ward MD as Referring Physician (Gastroenterology)    Comprehensive Medical and Social History  Patient Active Problem List   Diagnosis   • Burgos's esophagus without dysplasia   • Hx of malignant melanoma   • Mixed hyperlipidemia   • Sleep disorder   • Type 2 diabetes mellitus without complication, without long-term current use of insulin (CMS/HCC)   • Nocturia     Past Medical History:   Diagnosis Date   • Melanoma (CMS/HCC)      Past Surgical History:   Procedure Laterality Date   • SKIN CANCER DESTRUCTION       Allergies   Allergen Reactions   • Penicillins      Current Outpatient Medications   Medication Sig Dispense Refill   • metFORMIN (GLUCOPHAGE) 500 mg tablet TAKE 1 TABLET BY MOUTH EVERY DAY WITH DINNER 90 tablet 1   • multivitamin (THERAGRAN) tablet take 1 tablet by oral route  every day     • pantoprazole (PROTONIX) 40 mg EC tablet Take 1 tablet (40 mg total) by mouth once daily. 90 tablet 0   • simvastatin (ZOCOR) 10 mg tablet Take 1 tablet (10 mg total) by mouth nightly. 90 tablet 0   • aspirin 81 mg enteric coated tablet Take 81 mg by mouth daily.     • naproxen (NAPROSYN) 500 mg tablet Take 1 tablet (500 mg total) by mouth 2 (two) times a day with meals. 30 tablet 0   • zolpidem (AMBIEN) 10 mg tablet Take 1 tablet (10 mg total) by mouth nightly. 90 tablet 0     No current facility-administered medications for this visit.     Social History     Tobacco Use   • Smoking status: Former Smoker   • Smokeless tobacco: Never Used   Substance Use Topics   • Alcohol use: Yes   • Drug use: No     History reviewed. No pertinent family history.    Objective   Vitals  Vitals:     "11/17/21 1051   BP: 132/76   BP Location: Left upper arm   Patient Position: Sitting   Pulse: 60   Resp: 16   Temp: 36.4 °C (97.5 °F)   TempSrc: Oral   SpO2: 99%   Weight: 63.2 kg (139 lb 6.4 oz)   Height: 1.602 m (5' 3.09\")     Body mass index is 24.62 kg/m².    Advanced Care Plan  Does patient have advance directive?: Yes                                     PHQ  Will the patient answer the depression questions?: Yes   Little interest or pleasure in doing things: Not at all   Feeling down, depressed, or hopeless: Not at all   Depression Risk: 0                                             Mini Cog  Completed: Yes  Score: 5  Result: Negative      Get Up and Go  Result: Pass    STEADI Falls Risk  One or more falls in the last year: No                       Feels unsteady when walking: No       Often feels sad or depressed: No           Worried about falling: No           Falls screen completed: Yes     Hearing and Vision Screening  No exam data present  See HRA for relevant hearing screening response.    Assessment/Plan   Diagnoses and all orders for this visit:    Need for influenza vaccination (Primary)  -     Influenza vaccine 65 and older IM preservative free    Other orders  -     pantoprazole 40 mg EC tablet; Take 1 tablet (40 mg total) by mouth once daily.        See Patient Instructions (the written plan) which was given to the patient for PPPS and health risk factors with interventions.   "

## 2021-11-17 NOTE — PATIENT INSTRUCTIONS
Patient Education     Fall Prevention in the Home, Adult  Falls can cause injuries. They can happen to people of all ages. There are many things you can do to make your home safe and to help prevent falls. Ask for help when making these changes, if needed.  What actions can I take to prevent falls?  General Instructions  · Use good lighting in all rooms. Replace any light bulbs that burn out.  · Turn on the lights when you go into a dark area. Use night-lights.  · Keep items that you use often in easy-to-reach places. Lower the shelves around your home if necessary.  · Set up your furniture so you have a clear path. Avoid moving your furniture around.  · Do not have throw rugs and other things on the floor that can make you trip.  · Avoid walking on wet floors.  · If any of your floors are uneven, fix them.  · Add color or contrast paint or tape to clearly leonarda and help you see:  ? Any grab bars or handrails.  ? First and last steps of stairways.  ? Where the edge of each step is.  · If you use a stepladder:  ? Make sure that it is fully opened. Do not climb a closed stepladder.  ? Make sure that both sides of the stepladder are locked into place.  ? Ask someone to hold the stepladder for you while you use it.  · If there are any pets around you, be aware of where they are.  What can I do in the bathroom?         · Keep the floor dry. Clean up any water that spills onto the floor as soon as it happens.  · Remove soap buildup in the tub or shower regularly.  · Use non-skid mats or decals on the floor of the tub or shower.  · Attach bath mats securely with double-sided, non-slip rug tape.  · If you need to sit down in the shower, use a plastic, non-slip stool.  · Install grab bars by the toilet and in the tub and shower. Do not use towel bars as grab bars.  What can I do in the bedroom?  · Make sure that you have a light by your bed that is easy to reach.  · Do not use any sheets or blankets that are too big for your  bed. They should not hang down onto the floor.  · Have a firm chair that has side arms. You can use this for support while you get dressed.  What can I do in the kitchen?  · Clean up any spills right away.  · If you need to reach something above you, use a strong step stool that has a grab bar.  · Keep electrical cords out of the way.  · Do not use floor polish or wax that makes floors slippery. If you must use wax, use non-skid floor wax.  What can I do with my stairs?  · Do not leave any items on the stairs.  · Make sure that you have a light switch at the top of the stairs and the bottom of the stairs. If you do not have them, ask someone to add them for you.  · Make sure that there are handrails on both sides of the stairs, and use them. Fix handrails that are broken or loose. Make sure that handrails are as long as the stairways.  · Install non-slip stair treads on all stairs in your home.  · Avoid having throw rugs at the top or bottom of the stairs. If you do have throw rugs, attach them to the floor with carpet tape.  · Choose a carpet that does not hide the edge of the steps on the stairway.  · Check any carpeting to make sure that it is firmly attached to the stairs. Fix any carpet that is loose or worn.  What can I do on the outside of my home?  · Use bright outdoor lighting.  · Regularly fix the edges of walkways and driveways and fix any cracks.  · Remove anything that might make you trip as you walk through a door, such as a raised step or threshold.  · Trim any bushes or trees on the path to your home.  · Regularly check to see if handrails are loose or broken. Make sure that both sides of any steps have handrails.  · Install guardrails along the edges of any raised decks and porches.  · Clear walking paths of anything that might make someone trip, such as tools or rocks.  · Have any leaves, snow, or ice cleared regularly.  · Use sand or salt on walking paths during winter.  · Clean up any spills in  your garage right away. This includes grease or oil spills.  What other actions can I take?  · Wear shoes that:  ? Have a low heel. Do not wear high heels.  ? Have rubber bottoms.  ? Are comfortable and fit you well.  ? Are closed at the toe. Do not wear open-toe sandals.  · Use tools that help you move around (mobility aids) if they are needed. These include:  ? Canes.  ? Walkers.  ? Scooters.  ? Crutches.  · Review your medicines with your doctor. Some medicines can make you feel dizzy. This can increase your chance of falling.  Ask your doctor what other things you can do to help prevent falls.  Where to find more information  · Centers for Disease Control and Prevention, [place]: https://cdc.gov  · National [place] [employer] A[employer]: https://tz1gsyo.debbie.nih.gov  Contact a doctor if:  · You are afraid of falling at home.  · You feel weak, drowsy, or dizzy at home.  · You fall at home.  Summary  · There are many simple things that you can do to make your home safe and to help prevent falls.  · Ways to make your home safe include removing tripping hazards and installing grab bars in the bathroom.  · Ask for help when making these changes in your home.  This information is not intended to replace advice given to you by your health care provider. Make sure you discuss any questions you have with your health care provider.  Document Revised: 04/09/2020 Document Reviewed: 08/02/2018  [employer] Patient Education © 2021 [employer]                            Your Personalized Prevention Plan Services (PPPS)    Preventive Services Checklist (Assumes Average Risk Unless Otherwise Noted):    Health Maintenance Topics with due status: Overdue       Topic Date Due    DTaP, Tdap, and Td Vaccines Never done    Zoster Vaccine Never done    Annual Falls Risk Screening Never done    Annual Dilated Retinal Exam 05/19/2021    Influenza Vaccine 08/01/2021    Medicare Annual Wellness Visit 11/11/2021     Health Maintenance Topics with due  status: Not Due       Topic Last Completion Date    Colonoscopy 04/23/2013    Hemoglobin A1C 06/21/2021    Diabetic Foot Exam 07/04/2021    COVID-19 Vaccine 09/01/2021    Urine Protein Screening 11/12/2021     Health Maintenance Topics with due status: Completed       Topic Last Completion Date    Hepatitis C Screening 08/24/2016    Pneumococcal 12/22/2016    Pneumococcal (65 years and older) 12/22/2016     Health Maintenance Topics with due status: Aged Out       Topic Date Due    Meningococcal ACWY Aged Out    HIB Vaccines Aged Out    IPV Vaccines Aged Out    HPV Vaccines Aged Out       You May Be Eligible for These Additional Preventive Services   (Assumes Average Risk Unless Otherwise Noted)  Diabetes Screening Any 1 risk factor: hypertension, dyslipidemia, obesity, high glucose; or Any 2 risk factors: >=64yo, overweight, family history diabetes (covered every 6 months)   Hepatitis C Screening Any 1 risk factor: 1) blood transfusion before 1992,   2) current or past injection drug use (annually for high risk; if born between [birth year]66, see above for status).   Vaccine: Hepatitis B As necessary if at-risk: hemophilia, ESRD, diabetes, living with individual infected with hep B, healthcare worker with frequent contact with blood/bodily fluids (series covered once)   Sexually Transmitted Diseases (STDs) As necessary chlamydia, gonorrhea, syphilis, hepatitis B (covered annually)  HIV if any 1 risk factor present: 1) <14yo or >64yo and at increased risk or 2) 15-64yo and ask for it (covered annually)   Lung Cancer Screening Low dose chest CT if all 3 risk factors: 1) 55-76yo, 2) smoker or quit within last 15y, 3) >=30 pack years (covered annually).  No results found for this or any previous visit.       Cholesterol Screening No signs or symptoms of cardiovascular disease (screening covered every 5 years).     Prostate Cancer Screening >= 49yo if patient desires test after weighting potential harms and benefits  (covered annually)     Abdominal Aortic Aneurysm Screening Any 1 risk factor: 1) family history of AAA or 2) 65-74yo and smoked 100+ cigarettes in a lifetime (covered once).   No results found for this or any previous visit.   No results found for this or any previous visit.       Glaucoma Screening Any 1 risk factor: 1) diabetes, 2) family history glaucoma, 3)  >=49yo, 4)  American >=66yo (covered annually)           Health Risk Factors with Personalized Education:  ----------------------------------------------------------------------------------------------------------------------  Controlling Your Blood Pressure  · Maintain a normal weight (body mass index between 18.5 and 24.9).  · Eat more fruit, vegetables and low-fat dairy.  · Eat less saturated fat and total fat.  · Lower your sodium (salt) intake.  Try to stay under 1500 mg per day, but if you cannot get your intake to be that low, at least lower it by 1000 mg.  · Stay active.  Try to get at least 90 to 150 minutes of exercise per week.  Try brisk walking, swimming, bicycling or dancing.  · Limit alcohol intake.  When you do consume alcohol, drink no more than 2 drinks per day.  · If you have been prescribed medication, take it regularly and exactly as prescribed.  Let your PCP know if you have any problems or questions about your medication.  · Check your blood pressure at home or at the store.  Write down your readings and share them with your PCP  ----------------------------------------------------------------------------------------------------------------------  Controlling Your Diabetes  · Stress can raise your blood sugar.  Learn what causes your stress.  Learn to lower your stress by spending time with family and friends, exercising, deep breathing, trying meditation or yoga, enjoying hobbies and getting enough rest.  Let your PCP know if you’re having problems limiting your stress.  · Maintain a healthy weight by balancing  your diet and exercise.  · Choose foods that are lower in calories, saturated fat, trans fat, sugar and salt.  Eat foods with more fiber, like whole grain cereals, bread, crackers, rice or pasta.  Choose to eat fruit, vegetables, and low-fat or fat-free/skim dairy.  · Reduce the portion size of your meals.  Make half of your meal vegetables and fruit, and divide the other half between lean protein and whole grains.  · Drink water instead of juice and regular soda.  · Spend at least some time being active on most days of the week.  · Work to increase your muscle strength at least twice per week.  Try things like light weights, stretch bands, yoga, heavy gardening (digging, planting with tools) or push-ups.  · If you have been prescribed medication, take it regularly and exactly as prescribed.  Let your PCP know if you have any problems or questions about your medication.  · If you have been asked to check your blood sugar, write down your readings and share them with your PCP  ----------------------------------------------------------------------------------------------------------------------  Controlling Your Cholesterol  · Reduce the amount of saturated and trans fat in your diet.  Limit intake of red meat.  Consume only low-fat or non-fat/skim dairy.  Limit fried food.  Cook with vegetable oils.  · Reduce your intake of sugary foods, sugary drinks and alcohol.  · Eat a diet high in fruit, vegetables and whole grains.  · Get protein from fish, poultry and a small portion of nuts.  · Stay active.  Try to get at least 90 to 150 minutes of exercise per week.  Try brisk walking, swimming, bicycling or dancing.  · Maintain a healthy weight by balancing your diet and exercise.  · If you have been prescribed medication, take it regularly and exactly as prescribed. Let your PCP know if you have any problems or questions about your medication.  · It’s important to know your cholesterol numbers.  When recommended by your  PCP, get the cholesterol blood test.  ----------------------------------------------------------------------------------------------------------------------  Reducing Your Risk of Falls  · Tell your PCP if any of your medications make you feel tired, dizzy, lightheaded or off-balance.  · Maintain coordination, flexibility and balance by ensuring regular physical activity.  · Limit alcohol intake to 1 drink per day.  Consider avoiding all alcohol intake.  · Ensure good vision.  Visit an ophthalmologist or optometrist regularly for vision screening or to make sure your glasses / contact lens prescription is correct.  If you need glasses or contacts, wear them.  When you get new glasses or contacts, take time to get used to them.  Do not wear sunglasses or tinted lenses when indoors.  · Ensure good hearing.  Have your hearing checked if you are having trouble hearing, or family and friends think you cannot hear them.  If you need a hearing aid, be sure it fits well and wear it.  · Get enough rest.  Ensure about 7-9 hours of sleep every day.  · Get up slowly from your bed or chairs.  Do not start walking until you are sure you feel steady.  · Wear non-skid, rubber-soled, low-heeled shoes.  Do not walk in socks, or in shoes and slippers with smooth soles.  · If your PCP or therapist recommends using a cane or walker, use it regularly.  · Make your home safer.  Increase lighting throughout the house, especially at the top and bottom of stairs.  Ensure lighting is easily turned on when getting up in the middle of the night.  Make sure there are two secure rails on all stairs.  Install grab bars in the bathtub / shower and near the toilet.  Consider using a shower chair and / or a hand-held shower.  · Spread sand or salt on icy surfaces.  Beware of wet surfaces, which can be icy.  · Tell your PCP if you have fallen.    CHECK LABS    Patient was given routine advice on diet, exercise, and weight reduction.  Recommend a  repeat PE in 1 year  The pros and cons on PSA testing were discussed and he has chosen to have a PSA done  Discussed colorectal screening    VACCINE INFORMATION STATEMENT    Influenza (Flu) Vaccine (Inactivated or Recombinant): What you need to know    Many vaccine information statements are available in [place] and other languages.   See www.immunize.org/vis    Hojas de información sobre vacunas están disponibles en español y en muchos otros   idiomas. Visite www.immunize.org/vis    1. Why get vaccinated?  Influenza vaccine can prevent influenza (flu).  Flu is a contagious disease that spreads around the [place] every year, usually between October and May. Anyone can get the flu, but it is more dangerous for some people. Infants and young children, people 65 years and older, pregnant people,   and people with certain health conditions or a weakened immune system are at greatest risk of flu complications.  Pneumonia, bronchitis, sinus infections, and ear infections are examples of flu-related complications. If you have a medical condition, such as heart disease, cancer, or diabetes, flu can make it worse.Flu can cause fever and chills, sore throat, muscle   aches, fatigue, cough, headache, and runny or stuffy nose. Some people may have vomiting and diarrhea, though this is more common in children than adults.In an average year, thousands of people in the [place] die from flu, and many more are   hospitalized. Flu vaccine prevents millions of illnesses and flu-related visits to the doctor each year.    2. Influenza vaccines  CDC recommends everyone 6 months and older get vaccinated every flu season. Children 6 months through 8 years of age may need 2 doses during a single flu season. Everyone else needs only 1 dose each flu season. It takes about 2 weeks for protection to develop after vaccination.There are many flu viruses, and they are always changing. Each year a new flu vaccine is made to protect  against the influenza viruses believed to be likely to cause disease in the upcoming flu season. Even when the vaccine doesn’t exactly match these viruses, it may still provide some protection.    Influenza vaccine does not cause flu.    Influenza vaccine may be given at the same time as other vaccines.    3. Talk with your health care provider  Tell your vaccination provider if the person getting the vaccine:  · Has had an allergic reaction after a previous dose of influenza vaccine, or has any severe, lifethreatening allergies  · Has ever had Guillain-Barré Syndrome (also called “GBS”)  In some cases, your health care provider may decide to postpone influenza vaccination until a future visit.  Influenza vaccine can be administered at any   time during pregnancy. People who are or will be   pregnant during influenza season should receive   inactivated influenza vaccine.    People with minor illnesses, such as a cold, may be vaccinated. People who are moderately or severely ill should usually wait until they recover before getting   influenza vaccine.  Your health care provider can give you more information.  4. Risks of a vaccine reaction  · Soreness, redness, and swelling where the shot is given, fever, muscle aches, and headache can happen after influenza vaccination.  · There may be a very small increased risk of Guillain-Barré Syndrome (GBS) after inactivated influenza vaccine (the flu shot).  Young children who get the flu shot along with pneumococcal vaccine (PCV13) and/or DTaP vaccine at the same time might be slightly more likely to have a seizure caused by fever. Tell your health care provider if a child who is getting flu vaccine has ever had a seizure.  People sometimes faint after medical procedures, including vaccination. Tell your provider if you feel dizzy or have vision changes or ringing in the ears.As with any medicine, there is a very remote chance of a vaccine causing a severe allergic reaction,  other serious injury, or death.    5. What if there is a serious problem?  An allergic reaction could occur after the vaccinated person leaves the clinic. If you see signs of a severe allergic reaction (hives, swelling of the face and throat, difficulty breathing, a fast heartbeat, dizziness, or weakness), call 9-1-1 and get the person   to the nearest hospital.  For other signs that concern you, call your health care provider.  Adverse reactions should be reported to the Vaccine Adverse Event Reporting System (VAERS). Your health care provider will usually file this report, or   you can do it yourself. Visit the VAERS website at www.vaers.[facility].gov or call 1-479.954.4751. VAERS is only for reporting reactions, and VAERS staff   members do not give medical advice.    6. The National Vaccine Injury Compensation Program  The National Vaccine Injury Compensation Program (VICP) is a federal program that was created to compensate people who may have been injured by certain vaccines. Claims regarding alleged injury or death due to vaccination have a time limit for filing,   which may be as short as two years. Visit the VICP website at www.hrsa.gov/vaccinecompensation or call 1-483.441.6155 to learn about the program and about filing a claim.    7. How can I learn more?  · ‚Ask your health care provider.  · Call your local or state health department.  · ‚Visit the website of the Food and Drug Administration (FDA) for vaccine package   inserts and additional information at www.fda.gov/vaccines-blood-biologics/vaccines.  · ‚Contact the Centers for Disease Control and Prevention (CDC):  -Call 1-589.360.1066 (6-814-FSH-INFO) or  -Visit CDC’s website at www.cdc.gov/flu    Vaccine Information Statement Inactivated Influenza Vaccine  42 U.S.C. § 300aa-26 8/6/2021

## 2021-11-21 ASSESSMENT — ENCOUNTER SYMPTOMS
HEMATURIA: 0
BRUISES/BLEEDS EASILY: 0
SLEEP DISTURBANCE: 0
CHILLS: 0
HEADACHES: 0
DYSURIA: 0
UNEXPECTED WEIGHT CHANGE: 0
NUMBNESS: 0
PALPITATIONS: 0
NAUSEA: 0
COLOR CHANGE: 0
SINUS PAIN: 0
SINUS PRESSURE: 0
CONSTIPATION: 0
ABDOMINAL PAIN: 0
SORE THROAT: 0
BLOOD IN STOOL: 0
CHEST TIGHTNESS: 0
FATIGUE: 0
VOICE CHANGE: 0
SHORTNESS OF BREATH: 0
FREQUENCY: 0
ADENOPATHY: 0
DIARRHEA: 0
ARTHRALGIAS: 0
COUGH: 0
DIZZINESS: 0

## 2022-01-03 RX ORDER — SIMVASTATIN 10 MG/1
10 TABLET, FILM COATED ORAL NIGHTLY
Qty: 90 TABLET | Refills: 1 | Status: SHIPPED | OUTPATIENT
Start: 2022-01-03 | End: 2022-05-09 | Stop reason: SDUPTHER

## 2022-01-03 NOTE — TELEPHONE ENCOUNTER
Medicine Refill Request    Query RX simvastatin     Last Office: 11/17/2021   Last Consult Visit: 9/15/2021  Last Telemedicine Visit: 4/14/2020 Della Hernandez DO    Next Appointment: 3/21/2022      Current Outpatient Medications:   •  aspirin 81 mg enteric coated tablet, Take 81 mg by mouth daily., Disp: , Rfl:   •  metFORMIN (GLUCOPHAGE) 500 mg tablet, TAKE 1 TABLET BY MOUTH EVERY DAY WITH DINNER, Disp: 90 tablet, Rfl: 1  •  multivitamin (THERAGRAN) tablet, take 1 tablet by oral route  every day, Disp: , Rfl:   •  naproxen (NAPROSYN) 500 mg tablet, Take 1 tablet (500 mg total) by mouth 2 (two) times a day with meals., Disp: 30 tablet, Rfl: 0  •  pantoprazole 40 mg EC tablet, Take 1 tablet (40 mg total) by mouth once daily., Disp: 90 tablet, Rfl: 0  •  simvastatin (ZOCOR) 10 mg tablet, Take 1 tablet (10 mg total) by mouth nightly., Disp: 90 tablet, Rfl: 0      BP Readings from Last 3 Encounters:   11/17/21 122/70   09/15/21 130/68   06/30/21 132/70       Recent Lab results:  Lab Results   Component Value Date    CHOL 200 (H) 06/21/2021   ,   Lab Results   Component Value Date    HDL 59 06/21/2021   ,   Lab Results   Component Value Date    LDLCALC 128 (H) 06/21/2021   ,   Lab Results   Component Value Date    TRIG 70 06/21/2021        Lab Results   Component Value Date    GLUCOSE 105 (H) 11/12/2021   ,   Lab Results   Component Value Date    HGBA1C 6.0 (H) 06/21/2021         Lab Results   Component Value Date    CREATININE 0.80 11/12/2021       Lab Results   Component Value Date    TSH 0.998 10/27/2015

## 2022-03-07 RX ORDER — PANTOPRAZOLE SODIUM 40 MG/1
40 TABLET, DELAYED RELEASE ORAL
Qty: 90 TABLET | Refills: 1 | Status: SHIPPED | OUTPATIENT
Start: 2022-03-07 | End: 2022-06-20 | Stop reason: SDUPTHER

## 2022-03-07 NOTE — TELEPHONE ENCOUNTER
Medicine Refill Request    Query RX pantoprazole     Last Office: 11/17/2021   Last Consult Visit: 9/15/2021  Last Telemedicine Visit: 4/14/2020 Della Hernandez DO    Next Appointment: 3/21/2022      Current Outpatient Medications:   •  aspirin 81 mg enteric coated tablet, Take 81 mg by mouth daily., Disp: , Rfl:   •  metFORMIN (GLUCOPHAGE) 500 mg tablet, TAKE 1 TABLET BY MOUTH EVERY DAY WITH DINNER, Disp: 90 tablet, Rfl: 1  •  multivitamin (THERAGRAN) tablet, take 1 tablet by oral route  every day, Disp: , Rfl:   •  naproxen (NAPROSYN) 500 mg tablet, Take 1 tablet (500 mg total) by mouth 2 (two) times a day with meals., Disp: 30 tablet, Rfl: 0  •  pantoprazole 40 mg EC tablet, Take 1 tablet (40 mg total) by mouth once daily., Disp: 90 tablet, Rfl: 0  •  simvastatin 10 mg tablet, Take 1 tablet (10 mg total) by mouth nightly., Disp: 90 tablet, Rfl: 1      BP Readings from Last 3 Encounters:   11/17/21 122/70   09/15/21 130/68   06/30/21 132/70       Recent Lab results:  Lab Results   Component Value Date    CHOL 200 (H) 06/21/2021   ,   Lab Results   Component Value Date    HDL 59 06/21/2021   ,   Lab Results   Component Value Date    LDLCALC 128 (H) 06/21/2021   ,   Lab Results   Component Value Date    TRIG 70 06/21/2021        Lab Results   Component Value Date    GLUCOSE 105 (H) 11/12/2021   ,   Lab Results   Component Value Date    HGBA1C 6.0 (H) 06/21/2021         Lab Results   Component Value Date    CREATININE 0.80 11/12/2021       Lab Results   Component Value Date    TSH 0.998 10/27/2015

## 2022-03-16 LAB
ALBUMIN SERPL-MCNC: 4.6 G/DL (ref 3.7–4.7)
ALBUMIN/GLOB SERPL: 2.2 {RATIO} (ref 1.2–2.2)
ALP SERPL-CCNC: 59 IU/L (ref 44–121)
ALT SERPL-CCNC: 16 IU/L (ref 0–44)
AST SERPL-CCNC: 17 IU/L (ref 0–40)
BILIRUB SERPL-MCNC: 0.8 MG/DL (ref 0–1.2)
BUN SERPL-MCNC: 13 MG/DL (ref 8–27)
BUN/CREAT SERPL: 17 (ref 10–24)
CALCIUM SERPL-MCNC: 10 MG/DL (ref 8.6–10.2)
CHLORIDE SERPL-SCNC: 104 MMOL/L (ref 96–106)
CHOLEST SERPL-MCNC: 169 MG/DL (ref 100–199)
CO2 SERPL-SCNC: 27 MMOL/L (ref 20–29)
CREAT SERPL-MCNC: 0.77 MG/DL (ref 0.76–1.27)
EGFRCR SERPLBLD CKD-EPI 2021: 95 ML/MIN/1.73
GLOBULIN SER CALC-MCNC: 2.1 G/DL (ref 1.5–4.5)
GLUCOSE SERPL-MCNC: 119 MG/DL (ref 65–99)
HBA1C MFR BLD: 6 % (ref 4.8–5.6)
HDLC SERPL-MCNC: 59 MG/DL
LDLC SERPL CALC-MCNC: 96 MG/DL (ref 0–99)
POTASSIUM SERPL-SCNC: 4.5 MMOL/L (ref 3.5–5.2)
PROT SERPL-MCNC: 6.7 G/DL (ref 6–8.5)
PSA SERPL-MCNC: 0.6 NG/ML (ref 0–4)
SODIUM SERPL-SCNC: 144 MMOL/L (ref 134–144)
TRIGL SERPL-MCNC: 72 MG/DL (ref 0–149)
TSH SERPL DL<=0.005 MIU/L-ACNC: 0.89 UIU/ML (ref 0.45–4.5)
VLDLC SERPL CALC-MCNC: 14 MG/DL (ref 5–40)

## 2022-03-21 ENCOUNTER — OFFICE VISIT (OUTPATIENT)
Dept: FAMILY MEDICINE | Facility: CLINIC | Age: 72
End: 2022-03-21
Payer: MEDICARE

## 2022-03-21 VITALS
RESPIRATION RATE: 16 BRPM | WEIGHT: 141 LBS | HEART RATE: 63 BPM | SYSTOLIC BLOOD PRESSURE: 130 MMHG | BODY MASS INDEX: 24.98 KG/M2 | TEMPERATURE: 98.3 F | HEIGHT: 63 IN | OXYGEN SATURATION: 98 % | DIASTOLIC BLOOD PRESSURE: 62 MMHG

## 2022-03-21 DIAGNOSIS — E11.9 TYPE 2 DIABETES MELLITUS WITHOUT COMPLICATION, WITHOUT LONG-TERM CURRENT USE OF INSULIN (CMS/HCC): Primary | ICD-10-CM

## 2022-03-21 DIAGNOSIS — E78.2 MIXED HYPERLIPIDEMIA: ICD-10-CM

## 2022-03-21 DIAGNOSIS — G47.9 SLEEP DISORDER: ICD-10-CM

## 2022-03-21 DIAGNOSIS — K22.70 BARRETT'S ESOPHAGUS WITHOUT DYSPLASIA: ICD-10-CM

## 2022-03-21 PROCEDURE — 99214 OFFICE O/P EST MOD 30 MIN: CPT | Performed by: FAMILY MEDICINE

## 2022-03-21 ASSESSMENT — ENCOUNTER SYMPTOMS
DIARRHEA: 0
SLEEP DISTURBANCE: 0
COUGH: 0
HEADACHES: 0
HEMATURIA: 0
COLOR CHANGE: 0
DIFFICULTY URINATING: 1
NUMBNESS: 0
DYSURIA: 0
ADENOPATHY: 0
UNEXPECTED WEIGHT CHANGE: 0
BRUISES/BLEEDS EASILY: 0
BLOOD IN STOOL: 0
FREQUENCY: 0
CHILLS: 0
PALPITATIONS: 0
ABDOMINAL PAIN: 0
NAUSEA: 0
CONSTIPATION: 0
FATIGUE: 0
SHORTNESS OF BREATH: 0
ARTHRALGIAS: 0
DIZZINESS: 0

## 2022-03-21 NOTE — PROGRESS NOTES
Subjective      Patient ID: Zeb Jefferson is a 71 y.o. male.    HPI     Medical management of diabetes, hypertension, hyperlipidemia  Pre visit care team luly performed  A report card has been prepared for today's visit  Following diet -mod exercise    CONCERNS : OCC DRIBBLING with urine    The following have been reviewed and updated as appropriate in this visit:    Review of Systems   Constitutional: Negative for chills, fatigue and unexpected weight change.   Eyes: Negative for visual disturbance (IMPROVED [facility] / [facility] OR).   Respiratory: Negative for cough and shortness of breath.    Cardiovascular: Negative for chest pain, palpitations and leg swelling.   Gastrointestinal: Negative for abdominal pain, blood in stool, constipation, diarrhea and nausea.   Endocrine: Negative for cold intolerance and heat intolerance.   Genitourinary: Positive for difficulty urinating. Negative for dysuria, frequency, hematuria and urgency.   Musculoskeletal: Negative for arthralgias.   Skin: Negative for color change.        RECENT MELANOMA RECURRENT ACW   Neurological: Negative for dizziness, numbness and headaches.   Hematological: Negative for adenopathy. Does not bruise/bleed easily.   Psychiatric/Behavioral: Negative for sleep disturbance.       Current Outpatient Medications   Medication Sig Dispense Refill   • metFORMIN (GLUCOPHAGE) 500 mg tablet TAKE 1 TABLET BY MOUTH EVERY DAY WITH DINNER 90 tablet 1   • multivitamin (THERAGRAN) tablet take 1 tablet by oral route  every day     • pantoprazole (PROTONIX) 40 mg EC tablet Take 1 tablet (40 mg total) by mouth once daily. 90 tablet 1   • simvastatin 10 mg tablet Take 1 tablet (10 mg total) by mouth nightly. 90 tablet 1   • aspirin 81 mg enteric coated tablet Take 81 mg by mouth daily.     • naproxen (NAPROSYN) 500 mg tablet Take 1 tablet (500 mg total) by mouth 2 (two) times a day with meals. 30 tablet 0     No current facility-administered medications for this visit.  "    Past Medical History:   Diagnosis Date   • Melanoma (CMS/HCC)      History reviewed. No pertinent family history.  Past Surgical History:   Procedure Laterality Date   • SKIN CANCER DESTRUCTION       Allergies   Allergen Reactions   • Penicillins      Social History     Socioeconomic History   • Marital status:      Spouse name: Not on file   • Number of children: Not on file   • Years of education: Not on file   • Highest education level: Not on file   Occupational History   • Not on file   Tobacco Use   • Smoking status: Former Smoker   • Smokeless tobacco: Never Used   Substance and Sexual Activity   • Alcohol use: Yes   • Drug use: No   • Sexual activity: Defer   Other Topics Concern   • Not on file   Social History Narrative   • Not on file     Social Determinants of Health     Financial Resource Strain: Not on file   Food Insecurity: Not on file   Transportation Needs: Not on file   Physical Activity: Not on file   Stress: Not on file   Social Connections: Not on file   Intimate Partner Violence: Not on file   Housing Stability: Not on file     Vitals:    03/21/22 0941 03/21/22 0955   BP: 124/74 130/62   BP Location: Left upper arm    Patient Position: Sitting    Pulse: 63    Resp: 16    Temp: 36.8 °C (98.3 °F)    TempSrc: Oral    SpO2: 98%    Weight: 64 kg (141 lb)    Height: 1.602 m (5' 3.09\")        Objective     Physical Exam  Constitutional:       Appearance: Normal appearance. He is well-developed.   HENT:      Head: Normocephalic.   Eyes:      Pupils: Pupils are equal, round, and reactive to light.      Funduscopic exam:     Right eye: No exudate or AV nicking.         Left eye: No exudate or AV nicking.   Neck:      Thyroid: No thyromegaly.      Vascular: No carotid bruit or JVD.   Cardiovascular:      Rate and Rhythm: Normal rate and regular rhythm.      Pulses: Normal pulses.      Heart sounds: No murmur heard.  Pulmonary:      Effort: Pulmonary effort is normal.      Breath sounds: Normal " breath sounds.   Abdominal:      General: Bowel sounds are normal.      Palpations: Abdomen is soft. There is no hepatomegaly or splenomegaly.      Tenderness: There is no abdominal tenderness. There is no right CVA tenderness or left CVA tenderness.   Musculoskeletal:      Right lower leg: No edema.      Left lower leg: No edema.   Lymphadenopathy:      Cervical: No cervical adenopathy.   Skin:     General: Skin is warm and dry.      Comments: Healing wound left upper chest wall - melanoma   Neurological:      Mental Status: He is alert and oriented to person, place, and time.      Sensory: No sensory deficit.   Psychiatric:         Mood and Affect: Mood normal.         Speech: Speech normal.         Behavior: Behavior normal.         Assessment/Plan   Problem List Items Addressed This Visit        Digestive    Burgos's esophagus without dysplasia     Asymptomatic - serial endoscopy - due 2022  Continue PPI              Endocrine/Metabolic    Mixed hyperlipidemia     IMPROVED hyperlipidemia control    LDL 98  - goal < 80  CONTINUE CURRENT THERAPY  -  REINFORCED DIET  Recommend low fat diet - Risks and potential complications of hyperlipidemia reviewed  Role of medications,diet, exercise in the treatment of hyperlipidemia discussed   Treatment plan and follow up reviewed and understood by patient           Type 2 diabetes mellitus without complication, without long-term current use of insulin (CMS/[facility]) - Primary     Good diabetic control.    A1c 6.0  - goal < 6.5  Current medications reviewed.  Recommend diet modifications and weight loss.   Met with patient to review diet, exercise, and glucometer readings  Reviewed medications and the importance of compliance  Diabetes health maintenance plan and follow up was discussed and understood by patient           Relevant Orders    Hemoglobin A1c    Basic metabolic panel       Other    Sleep disorder     Same meds - long term safety meds discussed                 Della  DEIRDRE Hernandez DO  3/25/2022

## 2022-03-25 NOTE — ASSESSMENT & PLAN NOTE
IMPROVED hyperlipidemia control    LDL 98  - goal < 80  CONTINUE CURRENT THERAPY  -  REINFORCED DIET  Recommend low fat diet - Risks and potential complications of hyperlipidemia reviewed  Role of medications,diet, exercise in the treatment of hyperlipidemia discussed   Treatment plan and follow up reviewed and understood by patient

## 2022-05-09 RX ORDER — SIMVASTATIN 10 MG/1
10 TABLET, FILM COATED ORAL NIGHTLY
Qty: 90 TABLET | Refills: 1 | Status: SHIPPED | OUTPATIENT
Start: 2022-05-09 | End: 2022-09-26 | Stop reason: SDUPTHER

## 2022-05-09 RX ORDER — METFORMIN HYDROCHLORIDE 500 MG/1
TABLET ORAL
Qty: 90 TABLET | Refills: 1 | Status: SHIPPED | OUTPATIENT
Start: 2022-05-09 | End: 2022-09-26 | Stop reason: SDUPTHER

## 2022-05-09 NOTE — TELEPHONE ENCOUNTER
Medicine Refill Request    Last Office: 3/21/2022   Last Consult Visit: 9/15/2021  Last Telemedicine Visit: 4/14/2020 Della Hernandez,     Next Appointment: 7/21/2022      Current Outpatient Medications:   •  aspirin 81 mg enteric coated tablet, Take 81 mg by mouth daily., Disp: , Rfl:   •  metFORMIN (GLUCOPHAGE) 500 mg tablet, TAKE 1 TABLET BY MOUTH EVERY DAY WITH DINNER, Disp: 90 tablet, Rfl: 1  •  multivitamin (THERAGRAN) tablet, take 1 tablet by oral route  every day, Disp: , Rfl:   •  naproxen (NAPROSYN) 500 mg tablet, Take 1 tablet (500 mg total) by mouth 2 (two) times a day with meals., Disp: 30 tablet, Rfl: 0  •  pantoprazole (PROTONIX) 40 mg EC tablet, Take 1 tablet (40 mg total) by mouth once daily., Disp: 90 tablet, Rfl: 1  •  simvastatin 10 mg tablet, Take 1 tablet (10 mg total) by mouth nightly., Disp: 90 tablet, Rfl: 1      BP Readings from Last 3 Encounters:   03/21/22 130/62   11/17/21 122/70   09/15/21 130/68       Recent Lab results:  Lab Results   Component Value Date    CHOL 169 03/15/2022   ,   Lab Results   Component Value Date    HDL 59 03/15/2022   ,   Lab Results   Component Value Date    LDLCALC 96 03/15/2022   ,   Lab Results   Component Value Date    TRIG 72 03/15/2022        Lab Results   Component Value Date    GLUCOSE 119 (H) 03/15/2022   ,   Lab Results   Component Value Date    HGBA1C 6.0 (H) 03/15/2022         Lab Results   Component Value Date    CREATININE 0.77 03/15/2022       Lab Results   Component Value Date    TSH 0.894 03/15/2022

## 2022-06-20 RX ORDER — PANTOPRAZOLE SODIUM 40 MG/1
40 TABLET, DELAYED RELEASE ORAL
Qty: 90 TABLET | Refills: 1 | Status: SHIPPED | OUTPATIENT
Start: 2022-06-20 | End: 2022-09-26 | Stop reason: SDUPTHER

## 2022-06-20 NOTE — TELEPHONE ENCOUNTER
Medicine Refill Request    Last Office: 3/21/2022   Last Consult Visit: 9/15/2021  Last Telemedicine Visit: 4/14/2020 Della Hernandez,     Next Appointment: 7/21/2022      Current Outpatient Medications:   •  aspirin 81 mg enteric coated tablet, Take 81 mg by mouth daily., Disp: , Rfl:   •  metFORMIN (GLUCOPHAGE) 500 mg tablet, TAKE 1 TABLET BY MOUTH EVERY DAY WITH DINNER, Disp: 90 tablet, Rfl: 1  •  multivitamin (THERAGRAN) tablet, take 1 tablet by oral route  every day, Disp: , Rfl:   •  naproxen (NAPROSYN) 500 mg tablet, Take 1 tablet (500 mg total) by mouth 2 (two) times a day with meals., Disp: 30 tablet, Rfl: 0  •  pantoprazole (PROTONIX) 40 mg EC tablet, Take 1 tablet (40 mg total) by mouth once daily., Disp: 90 tablet, Rfl: 1  •  simvastatin (ZOCOR) 10 mg tablet, Take 1 tablet (10 mg total) by mouth nightly., Disp: 90 tablet, Rfl: 1      BP Readings from Last 3 Encounters:   03/21/22 130/62   11/17/21 122/70   09/15/21 130/68       Recent Lab results:  Lab Results   Component Value Date    CHOL 169 03/15/2022   ,   Lab Results   Component Value Date    HDL 59 03/15/2022   ,   Lab Results   Component Value Date    LDLCALC 96 03/15/2022   ,   Lab Results   Component Value Date    TRIG 72 03/15/2022        Lab Results   Component Value Date    GLUCOSE 119 (H) 03/15/2022   ,   Lab Results   Component Value Date    HGBA1C 6.0 (H) 03/15/2022         Lab Results   Component Value Date    CREATININE 0.77 03/15/2022       Lab Results   Component Value Date    TSH 0.894 03/15/2022

## 2022-07-12 LAB — HBA1C MFR BLD: 6.2 % (ref 4.8–5.6)

## 2022-07-13 LAB
BUN SERPL-MCNC: 15 MG/DL (ref 8–27)
BUN/CREAT SERPL: 18 (ref 10–24)
CALCIUM SERPL-MCNC: 9.8 MG/DL (ref 8.6–10.2)
CHLORIDE SERPL-SCNC: 103 MMOL/L (ref 96–106)
CO2 SERPL-SCNC: 22 MMOL/L (ref 20–29)
CREAT SERPL-MCNC: 0.85 MG/DL (ref 0.76–1.27)
EGFRCR SERPLBLD CKD-EPI 2021: 92 ML/MIN/1.73
GLUCOSE SERPL-MCNC: 111 MG/DL (ref 65–99)
POTASSIUM SERPL-SCNC: 4.4 MMOL/L (ref 3.5–5.2)
SODIUM SERPL-SCNC: 143 MMOL/L (ref 134–144)

## 2022-07-21 ENCOUNTER — OFFICE VISIT (OUTPATIENT)
Dept: FAMILY MEDICINE | Facility: CLINIC | Age: 72
End: 2022-07-21
Payer: MEDICARE

## 2022-07-21 VITALS
SYSTOLIC BLOOD PRESSURE: 128 MMHG | WEIGHT: 139 LBS | HEIGHT: 63 IN | RESPIRATION RATE: 14 BRPM | HEART RATE: 63 BPM | DIASTOLIC BLOOD PRESSURE: 60 MMHG | BODY MASS INDEX: 24.63 KG/M2 | TEMPERATURE: 98.4 F | OXYGEN SATURATION: 98 %

## 2022-07-21 DIAGNOSIS — K22.70 BARRETT'S ESOPHAGUS WITHOUT DYSPLASIA: ICD-10-CM

## 2022-07-21 DIAGNOSIS — R35.1 NOCTURIA: ICD-10-CM

## 2022-07-21 DIAGNOSIS — E78.2 MIXED HYPERLIPIDEMIA: ICD-10-CM

## 2022-07-21 DIAGNOSIS — E11.9 TYPE 2 DIABETES MELLITUS WITHOUT COMPLICATION, WITHOUT LONG-TERM CURRENT USE OF INSULIN (CMS/HCC): Primary | ICD-10-CM

## 2022-07-21 DIAGNOSIS — G47.9 SLEEP DISORDER: ICD-10-CM

## 2022-07-21 PROCEDURE — 99214 OFFICE O/P EST MOD 30 MIN: CPT | Performed by: FAMILY MEDICINE

## 2022-07-21 ASSESSMENT — ENCOUNTER SYMPTOMS
HEADACHES: 0
HEMATURIA: 0
DYSURIA: 0
BRUISES/BLEEDS EASILY: 0
PALPITATIONS: 0
FATIGUE: 0
DIARRHEA: 0
CONSTIPATION: 0
COLOR CHANGE: 0
NUMBNESS: 0
ABDOMINAL PAIN: 0
UNEXPECTED WEIGHT CHANGE: 0
SLEEP DISTURBANCE: 1
CHILLS: 0
DIZZINESS: 0
ADENOPATHY: 0
BLOOD IN STOOL: 0
ARTHRALGIAS: 0
SHORTNESS OF BREATH: 0
COUGH: 0
NAUSEA: 0
FREQUENCY: 0

## 2022-07-21 NOTE — PROGRESS NOTES
Subjective      Patient ID: Zeb Jefferson is a 71 y.o. male.    HPI     Medical management of diabetes, hypertension, hyperlipidemia  Pre visit care team luly performed  A report card has been prepared for today's visit  Following diet - yard work   Sees derm q 6months    The following have been reviewed and updated as appropriate in this visit:          Review of Systems   Constitutional: Negative for chills, fatigue and unexpected weight change.   Eyes: Negative for visual disturbance.   Respiratory: Negative for cough and shortness of breath.    Cardiovascular: Negative for chest pain, palpitations and leg swelling.   Gastrointestinal: Negative for abdominal pain, blood in stool, constipation, diarrhea and nausea.   Endocrine: Negative for cold intolerance and heat intolerance.   Genitourinary: Negative for dysuria, frequency, hematuria and urgency.   Musculoskeletal: Negative for arthralgias.   Skin: Negative for color change.   Neurological: Negative for dizziness, numbness and headaches.   Hematological: Negative for adenopathy. Does not bruise/bleed easily.   Psychiatric/Behavioral: Positive for sleep disturbance (occ awakening BUT awakens rested).       Current Outpatient Medications   Medication Sig Dispense Refill   • metFORMIN (GLUCOPHAGE) 500 mg tablet TAKE 1 TABLET BY MOUTH EVERY DAY WITH DINNER 90 tablet 1   • multivitamin (THERAGRAN) tablet take 1 tablet by oral route  every day     • pantoprazole (PROTONIX) 40 mg EC tablet Take 1 tablet (40 mg total) by mouth once daily. 90 tablet 1   • simvastatin (ZOCOR) 10 mg tablet Take 1 tablet (10 mg total) by mouth nightly. 90 tablet 1     No current facility-administered medications for this visit.     Past Medical History:   Diagnosis Date   • Melanoma (CMS/HCC)      No family history on file.  Past Surgical History:   Procedure Laterality Date   • SKIN CANCER DESTRUCTION       Allergies   Allergen Reactions   • Penicillins      Social History  "    Socioeconomic History   • Marital status:      Spouse name: Not on file   • Number of children: Not on file   • Years of education: Not on file   • Highest education level: Not on file   Occupational History   • Not on file   Tobacco Use   • Smoking status: Former Smoker   • Smokeless tobacco: Never Used   Substance and Sexual Activity   • Alcohol use: Yes   • Drug use: No   • Sexual activity: Defer   Other Topics Concern   • Not on file   Social History Narrative   • Not on file     Social Determinants of Health     Financial Resource Strain: Not on file   Food Insecurity: Not on file   Transportation Needs: Not on file   Physical Activity: Not on file   Stress: Not on file   Social Connections: Not on file   Intimate Partner Violence: Not on file   Housing Stability: Not on file     Vitals:    07/21/22 0822 07/21/22 0838   BP: 120/70 128/60   BP Location: Right upper arm    Patient Position: Sitting    Pulse: 63    Resp: 14    Temp: 36.9 °C (98.4 °F)    TempSrc: Oral    SpO2: 98%    Weight: 63 kg (139 lb)    Height: 1.6 m (5' 3\")        Objective     Physical Exam  Constitutional:       Appearance: Normal appearance. He is well-developed.   HENT:      Head: Normocephalic.   Eyes:      Pupils: Pupils are equal, round, and reactive to light.      Funduscopic exam:     Right eye: No exudate or AV nicking.         Left eye: No exudate or AV nicking.   Neck:      Thyroid: No thyromegaly.      Vascular: No carotid bruit or JVD.   Cardiovascular:      Rate and Rhythm: Normal rate and regular rhythm.      Pulses: Normal pulses.           Dorsalis pedis pulses are 2+ on the right side and 2+ on the left side.        Posterior tibial pulses are 2+ on the right side and 2+ on the left side.      Heart sounds: No murmur heard.  Pulmonary:      Effort: Pulmonary effort is normal.      Breath sounds: Normal breath sounds.   Chest:   Breasts:      Right: No supraclavicular adenopathy.      Left: No supraclavicular " adenopathy.       Abdominal:      General: Bowel sounds are normal.      Palpations: Abdomen is soft. There is no hepatomegaly or splenomegaly.      Tenderness: There is no abdominal tenderness. There is no right CVA tenderness or left CVA tenderness.       Musculoskeletal:      Right lower leg: No edema.      Left lower leg: No edema.      Right foot: No deformity.      Left foot: No deformity.   Feet:      Right foot:      Protective Sensation: 10 sites tested. 10 sites sensed.      Skin integrity: Skin integrity normal.      Left foot:      Protective Sensation: 10 sites tested. 10 sites sensed.      Skin integrity: Skin integrity normal.   Lymphadenopathy:      Cervical: No cervical adenopathy.      Upper Body:      Right upper body: No supraclavicular adenopathy.      Left upper body: No supraclavicular adenopathy.   Skin:     General: Skin is warm and dry.   Neurological:      Mental Status: He is alert and oriented to person, place, and time.      Sensory: No sensory deficit.   Psychiatric:         Mood and Affect: Mood normal.         Speech: Speech normal.         Behavior: Behavior normal.         Assessment/Plan   Problem List Items Addressed This Visit        Digestive    Burgos's esophagus without dysplasia     Asymptomatic - serial endoscopy - due 2022  Continue PPI              Endocrine/Metabolic    Mixed hyperlipidemia     IMPROVED hyperlipidemia control    LDL 98  - goal < 80  CONTINUE CURRENT THERAPY  -  REINFORCED DIET  Recommend low fat diet - Risks and potential complications of hyperlipidemia reviewed  Role of medications,diet, exercise in the treatment of hyperlipidemia discussed   Treatment plan and follow up reviewed and understood by patient           Relevant Orders    CBC and Differential    Comprehensive metabolic panel    Lipid panel    Type 2 diabetes mellitus without complication, without long-term current use of insulin (CMS/[facility]) - Primary     Good diabetic control.    A1c 6.2  -  goal < 6.5  Current medications reviewed.  Recommend diet modifications and weight loss.   Met with patient to review diet, exercise, and glucometer readings  Reviewed medications and the importance of compliance  Diabetes health maintenance plan and follow up was discussed and understood by patient           Relevant Orders    Hemoglobin A1c    Microalbumin / creatinine urine ratio       Other    Sleep disorder     same meds - long term safety meds discussed           Nocturia    Relevant Orders    HAYLEY Hernandez,   7/24/2022

## 2022-09-26 RX ORDER — SIMVASTATIN 10 MG/1
10 TABLET, FILM COATED ORAL NIGHTLY
Qty: 90 TABLET | Refills: 1 | Status: SHIPPED | OUTPATIENT
Start: 2022-09-26 | End: 2023-02-13 | Stop reason: SDUPTHER

## 2022-09-26 RX ORDER — PANTOPRAZOLE SODIUM 40 MG/1
40 TABLET, DELAYED RELEASE ORAL
Qty: 90 TABLET | Refills: 1 | Status: SHIPPED | OUTPATIENT
Start: 2022-09-26 | End: 2023-01-16 | Stop reason: SDUPTHER

## 2022-09-26 RX ORDER — METFORMIN HYDROCHLORIDE 500 MG/1
TABLET ORAL
Qty: 90 TABLET | Refills: 1 | Status: SHIPPED | OUTPATIENT
Start: 2022-09-26 | End: 2022-12-19 | Stop reason: SDUPTHER

## 2022-09-26 NOTE — TELEPHONE ENCOUNTER
Medicine Refill Request    Last Office: 7/21/2022   Last Consult Visit: 9/15/2021  Last Telemedicine Visit: 4/14/2020 Della Hernandez,     Next Appointment: 11/21/2022      Current Outpatient Medications:     metFORMIN (GLUCOPHAGE) 500 mg tablet, TAKE 1 TABLET BY MOUTH EVERY DAY WITH DINNER, Disp: 90 tablet, Rfl: 1    multivitamin (THERAGRAN) tablet, take 1 tablet by oral route  every day, Disp: , Rfl:     pantoprazole (PROTONIX) 40 mg EC tablet, Take 1 tablet (40 mg total) by mouth once daily., Disp: 90 tablet, Rfl: 1    simvastatin (ZOCOR) 10 mg tablet, Take 1 tablet (10 mg total) by mouth nightly., Disp: 90 tablet, Rfl: 1      BP Readings from Last 3 Encounters:   07/21/22 128/60   03/21/22 130/62   11/17/21 122/70       Recent Lab results:  Lab Results   Component Value Date    CHOL 169 03/15/2022   ,   Lab Results   Component Value Date    HDL 59 03/15/2022   ,   Lab Results   Component Value Date    LDLCALC 96 03/15/2022   ,   Lab Results   Component Value Date    TRIG 72 03/15/2022        Lab Results   Component Value Date    GLUCOSE 111 (H) 07/12/2022   ,   Lab Results   Component Value Date    HGBA1C 6.2 (H) 07/12/2022         Lab Results   Component Value Date    CREATININE 0.85 07/12/2022       Lab Results   Component Value Date    TSH 0.894 03/15/2022

## 2022-11-16 LAB
ALBUMIN SERPL-MCNC: 4.4 G/DL (ref 3.7–4.7)
ALBUMIN/CREAT UR: 22 MG/G CREAT (ref 0–29)
ALBUMIN/GLOB SERPL: 1.8 {RATIO} (ref 1.2–2.2)
ALP SERPL-CCNC: 65 IU/L (ref 44–121)
ALT SERPL-CCNC: 30 IU/L (ref 0–44)
AST SERPL-CCNC: 28 IU/L (ref 0–40)
BASOPHILS # BLD AUTO: 0 X10E3/UL (ref 0–0.2)
BASOPHILS NFR BLD AUTO: 1 %
BILIRUB SERPL-MCNC: 0.5 MG/DL (ref 0–1.2)
BUN SERPL-MCNC: 9 MG/DL (ref 8–27)
BUN/CREAT SERPL: 12 (ref 10–24)
CALCIUM SERPL-MCNC: 9.7 MG/DL (ref 8.6–10.2)
CHLORIDE SERPL-SCNC: 101 MMOL/L (ref 96–106)
CHOLEST SERPL-MCNC: 139 MG/DL (ref 100–199)
CO2 SERPL-SCNC: 28 MMOL/L (ref 20–29)
CREAT SERPL-MCNC: 0.78 MG/DL (ref 0.76–1.27)
CREAT UR-MCNC: 97.1 MG/DL
EGFRCR SERPLBLD CKD-EPI 2021: 94 ML/MIN/1.73
EOSINOPHIL # BLD AUTO: 0.1 X10E3/UL (ref 0–0.4)
EOSINOPHIL NFR BLD AUTO: 1 %
ERYTHROCYTE [DISTWIDTH] IN BLOOD BY AUTOMATED COUNT: 13.2 % (ref 11.6–15.4)
GLOBULIN SER CALC-MCNC: 2.4 G/DL (ref 1.5–4.5)
GLUCOSE SERPL-MCNC: 112 MG/DL (ref 70–99)
HBA1C MFR BLD: 6.2 % (ref 4.8–5.6)
HCT VFR BLD AUTO: 45.3 % (ref 37.5–51)
HDLC SERPL-MCNC: 44 MG/DL
HGB BLD-MCNC: 15.1 G/DL (ref 13–17.7)
IMM GRANULOCYTES # BLD AUTO: 0 X10E3/UL (ref 0–0.1)
IMM GRANULOCYTES NFR BLD AUTO: 0 %
LDLC SERPL CALC-MCNC: 79 MG/DL (ref 0–99)
LYMPHOCYTES # BLD AUTO: 2.4 X10E3/UL (ref 0.7–3.1)
LYMPHOCYTES NFR BLD AUTO: 39 %
MCH RBC QN AUTO: 29.5 PG (ref 26.6–33)
MCHC RBC AUTO-ENTMCNC: 33.3 G/DL (ref 31.5–35.7)
MCV RBC AUTO: 89 FL (ref 79–97)
MICROALBUMIN UR-MCNC: 21 UG/ML
MONOCYTES # BLD AUTO: 0.7 X10E3/UL (ref 0.1–0.9)
MONOCYTES NFR BLD AUTO: 12 %
NEUTROPHILS # BLD AUTO: 2.9 X10E3/UL (ref 1.4–7)
NEUTROPHILS NFR BLD AUTO: 47 %
PLATELET # BLD AUTO: 275 X10E3/UL (ref 150–450)
POTASSIUM SERPL-SCNC: 4.6 MMOL/L (ref 3.5–5.2)
PROT SERPL-MCNC: 6.8 G/DL (ref 6–8.5)
PSA SERPL-MCNC: 0.6 NG/ML (ref 0–4)
RBC # BLD AUTO: 5.12 X10E6/UL (ref 4.14–5.8)
SODIUM SERPL-SCNC: 144 MMOL/L (ref 134–144)
TRIGL SERPL-MCNC: 82 MG/DL (ref 0–149)
VLDLC SERPL CALC-MCNC: 16 MG/DL (ref 5–40)
WBC # BLD AUTO: 6.2 X10E3/UL (ref 3.4–10.8)

## 2022-11-21 ENCOUNTER — OFFICE VISIT (OUTPATIENT)
Dept: FAMILY MEDICINE | Facility: CLINIC | Age: 72
End: 2022-11-21
Payer: MEDICARE

## 2022-11-21 VITALS
HEIGHT: 63 IN | HEART RATE: 57 BPM | SYSTOLIC BLOOD PRESSURE: 134 MMHG | DIASTOLIC BLOOD PRESSURE: 70 MMHG | TEMPERATURE: 97.1 F | OXYGEN SATURATION: 98 % | WEIGHT: 138 LBS | RESPIRATION RATE: 14 BRPM | BODY MASS INDEX: 24.45 KG/M2

## 2022-11-21 DIAGNOSIS — E11.9 TYPE 2 DIABETES MELLITUS WITHOUT COMPLICATION, WITHOUT LONG-TERM CURRENT USE OF INSULIN (CMS/HCC): ICD-10-CM

## 2022-11-21 DIAGNOSIS — Z23 NEEDS FLU SHOT: ICD-10-CM

## 2022-11-21 DIAGNOSIS — Z85.820 HX OF MALIGNANT MELANOMA: ICD-10-CM

## 2022-11-21 DIAGNOSIS — R35.1 NOCTURIA: ICD-10-CM

## 2022-11-21 DIAGNOSIS — Z00.00 MEDICARE ANNUAL WELLNESS VISIT, SUBSEQUENT: Primary | ICD-10-CM

## 2022-11-21 DIAGNOSIS — E78.2 MIXED HYPERLIPIDEMIA: ICD-10-CM

## 2022-11-21 DIAGNOSIS — K22.70 BARRETT'S ESOPHAGUS WITHOUT DYSPLASIA: ICD-10-CM

## 2022-11-21 PROCEDURE — G0008 ADMIN INFLUENZA VIRUS VAC: HCPCS | Performed by: FAMILY MEDICINE

## 2022-11-21 PROCEDURE — 99214 OFFICE O/P EST MOD 30 MIN: CPT | Mod: 25 | Performed by: FAMILY MEDICINE

## 2022-11-21 PROCEDURE — G0439 PPPS, SUBSEQ VISIT: HCPCS | Performed by: FAMILY MEDICINE

## 2022-11-21 PROCEDURE — 90694 VACC AIIV4 NO PRSRV 0.5ML IM: CPT | Performed by: FAMILY MEDICINE

## 2022-11-21 ASSESSMENT — PATIENT HEALTH QUESTIONNAIRE - PHQ9: SUM OF ALL RESPONSES TO PHQ9 QUESTIONS 1 & 2: 0

## 2022-11-21 ASSESSMENT — MINI COG
TOTAL SCORE: 5
COMPLETED: YES

## 2022-11-21 NOTE — PROGRESS NOTES
Subjective      Patient ID: Zeb Jefferson is a 72 y.o. male.    HPI    Generally well  Following diet - walking  NO concerns    The following have been reviewed and updated as appropriate in this visit:   Allergies  Meds  Problems       Review of Systems   Constitutional: Negative for chills, fatigue and unexpected weight change.   HENT: Negative for dental problem, hearing loss, sinus pressure, sinus pain, sore throat and voice change.    Eyes: Negative for visual disturbance.   Respiratory: Negative for cough, chest tightness and shortness of breath.    Cardiovascular: Negative for chest pain, palpitations and leg swelling.   Gastrointestinal: Negative for abdominal pain, blood in stool, constipation, diarrhea and nausea.   Endocrine: Negative for cold intolerance and heat intolerance.   Genitourinary: Negative for dysuria, frequency, hematuria and urgency.   Musculoskeletal: Negative for arthralgias.   Skin: Negative for color change and rash.   Neurological: Negative for dizziness, numbness and headaches.   Hematological: Negative for adenopathy. Does not bruise/bleed easily.   Psychiatric/Behavioral: Negative for sleep disturbance.       Current Outpatient Medications   Medication Sig Dispense Refill    metFORMIN (GLUCOPHAGE) 500 mg tablet TAKE 1 TABLET BY MOUTH EVERY DAY WITH DINNER 90 tablet 1    multivitamin (THERAGRAN) tablet take 1 tablet by oral route  every day      pantoprazole (PROTONIX) 40 mg EC tablet Take 1 tablet (40 mg total) by mouth once daily. 90 tablet 1    simvastatin (ZOCOR) 10 mg tablet Take 1 tablet (10 mg total) by mouth nightly. 90 tablet 1     No current facility-administered medications for this visit.     Past Medical History:   Diagnosis Date    Melanoma (CMS/HCC)     under care of derm     History reviewed. No pertinent family history.  Past Surgical History:   Procedure Laterality Date    SKIN CANCER DESTRUCTION       Allergies   Allergen Reactions    Penicillins   "    Social History     Socioeconomic History    Marital status:      Spouse name: Not on file    Number of children: Not on file    Years of education: Not on file    Highest education level: Not on file   Occupational History    Not on file   Tobacco Use    Smoking status: Former    Smokeless tobacco: Never   Substance and Sexual Activity    Alcohol use: Yes    Drug use: No    Sexual activity: Defer   Other Topics Concern    Not on file   Social History Narrative    Not on file     Social Determinants of Health     Financial Resource Strain: Not on file   Food Insecurity: Not on file   Transportation Needs: Not on file   Physical Activity: Not on file   Stress: Not on file   Social Connections: Not on file   Intimate Partner Violence: Not on file   Housing Stability: Not on file     Vitals:    11/21/22 1316 11/21/22 1333   BP: 130/72 134/70   BP Location: Left upper arm    Patient Position: Sitting    Pulse: (!) 57    Resp: 14    Temp: 36.2 °C (97.1 °F)    TempSrc: Oral    SpO2: 98%    Weight: 62.6 kg (138 lb)    Height: 1.6 m (5' 3\")        Objective     Physical Exam  Constitutional:       Appearance: Normal appearance. He is well-developed and well-nourished.   HENT:      Head: Normocephalic.      Right Ear: Tympanic membrane and external ear normal.      Left Ear: Tympanic membrane and external ear normal.      Nose: Nose normal.      Mouth/Throat:      Mouth: Oropharynx is clear and moist. Mucous membranes are moist.      Pharynx: Oropharynx is clear. No oropharyngeal exudate.   Eyes:      Extraocular Movements: EOM normal.      Conjunctiva/sclera: Conjunctivae normal.      Pupils: Pupils are equal, round, and reactive to light.      Funduscopic exam:     Right eye: No AV nicking.         Left eye: No AV nicking.   Neck:      Thyroid: No thyromegaly.      Vascular: No carotid bruit.   Cardiovascular:      Rate and Rhythm: Normal rate and regular rhythm.      Pulses: Normal pulses.      Heart " sounds: Normal heart sounds. No murmur heard.  Pulmonary:      Effort: Pulmonary effort is normal.      Breath sounds: Normal breath sounds.   Abdominal:      General: Bowel sounds are normal. There is no abdominal bruit. Aorta is normal.      Palpations: Abdomen is soft. There is no hepatomegaly or splenomegaly.      Tenderness: There is no abdominal tenderness. There is no CVA tenderness, right CVA tenderness or left CVA tenderness.      Hernia: There is no hernia in the left inguinal area or right inguinal area.   Genitourinary:     Prostate: Enlarged (+1). No nodules present.   Musculoskeletal:      Right lower leg: No edema.      Left lower leg: No edema.   Lymphadenopathy:      Cervical: No cervical adenopathy.      Upper Body:      Right upper body: No supraclavicular adenopathy.      Left upper body: No supraclavicular adenopathy.   Skin:     General: Skin is warm and dry.   Neurological:      Mental Status: He is alert and oriented to person, place, and time.      Sensory: No sensory deficit.   Psychiatric:         Mood and Affect: Mood and affect and mood normal.         Speech: Speech normal.         Behavior: Behavior normal.         Assessment/Plan   Problem List Items Addressed This Visit        Digestive    Burgos's esophagus without dysplasia     Asymptomatic - serial endoscopy   Continue PPI            Endocrine/Metabolic    Type 2 diabetes mellitus without complication, without long-term current use of insulin (CMS/[facility])     Good diabetic control.    A1c 6.2  - goal < 6.5  Current medications reviewed.  Recommend diet modifications and weight loss.   Met with patient to review diet, exercise, and glucometer readings  Reviewed medications and the importance of compliance  Diabetes health maintenance plan and follow up was discussed and understood by patient         Relevant Orders    Hemoglobin A1c    Basic metabolic panel       Other    Hx of malignant melanoma     SHARRON         Mixed hyperlipidemia      IMPROVED hyperlipidemia control    LDL 98  - goal < 80  CONTINUE CURRENT THERAPY  -  REINFORCED DIET  Recommend low fat diet - Risks and potential complications of hyperlipidemia reviewed  Role of medications,diet, exercise in the treatment of hyperlipidemia discussed   Treatment plan and follow up reviewed and understood by patient         Nocturia     Prostate cancer screening discussed  - check PSA        Other Visit Diagnoses     Medicare annual wellness visit, subsequent    -  Primary    Needs flu shot        Relevant Orders    Influenza vaccine Quad Adjuvanted 65 and Older (FluAd Quad) (Completed)          Della Hernandez,   11/26/2022   JACK

## 2022-11-21 NOTE — PROGRESS NOTES
Subjective     Rodolfo Jefferson is a 72 y.o. male who presents for a subsequent annual wellness visit.     Patient Care Team:  Della Hernandez DO as PCP - General  Ernst Valdivia MD as Referring Physician  Silvano Ward MD as Referring Physician (Gastroenterology)  Cedric Lowry MD as Consulting Physician (Dermatology)  Anne Franklin NP as Referring Physician (Dermatology)  Joe Davenport MD as Referring Physician ([facility])    Comprehensive Medical and Social History  Patient Active Problem List   Diagnosis    Burgos's esophagus without dysplasia    Hx of malignant melanoma    Mixed hyperlipidemia    Sleep disorder    Type 2 diabetes mellitus without complication, without long-term current use of insulin (CMS/HCC)    Nocturia     Past Medical History:   Diagnosis Date    Melanoma (CMS/HCC)     under care of derm     Past Surgical History:   Procedure Laterality Date    SKIN CANCER DESTRUCTION       Allergies   Allergen Reactions    Penicillins      Current Outpatient Medications   Medication Sig Dispense Refill    metFORMIN (GLUCOPHAGE) 500 mg tablet TAKE 1 TABLET BY MOUTH EVERY DAY WITH DINNER 90 tablet 1    multivitamin (THERAGRAN) tablet take 1 tablet by oral route  every day      pantoprazole (PROTONIX) 40 mg EC tablet Take 1 tablet (40 mg total) by mouth once daily. 90 tablet 1    simvastatin (ZOCOR) 10 mg tablet Take 1 tablet (10 mg total) by mouth nightly. 90 tablet 1     No current facility-administered medications for this visit.     Social History     Tobacco Use    Smoking status: Former    Smokeless tobacco: Never   Substance Use Topics    Alcohol use: Yes    Drug use: No     History reviewed. No pertinent family history.    Objective   Vitals  Vitals:    11/21/22 1316 11/21/22 1333   BP: 130/72 134/70   BP Location: Left upper arm    Patient Position: Sitting    Pulse: (!) 57    Resp: 14    Temp: 36.2 °C (97.1 °F)    TempSrc: Oral    SpO2: 98%  "   Weight: 62.6 kg (138 lb)    Height: 1.6 m (5' 3\")      Body mass index is 24.45 kg/m².    Advanced Care Plan                                        PHQ  Will the patient answer the depression questions?: Yes   Little interest or pleasure in doing things: Not at all   Feeling down, depressed, or hopeless: Not at all   Depression Risk: 0                                             Mini Cog  Completed: Yes  Score: 5  Result: Negative      Get Up and Go  Result: Pass    STEADI Falls Risk  One or more falls in the last year: No           Has trouble stepping up onto a curb: No   Advised to use a cane or walker to get around safely: No   Often has to rush to the toilet: No   Feels unsteady when walking: No   Has lost some feeling in feet: No   Often feels sad or depressed: No   Steadies self on furniture while walking at home: No   Takes medication that makes him/her feel lightheaded or more tired than usual: No   Worried about falling: No   Takes medicine to sleep or improve mood: No   Needs to push with hands when rising from a chair: No   Falls screen completed: Yes     Hearing and Vision Screening  No results found.  See HRA for relevant hearing screening response.    Assessment/Plan   Diagnoses and all orders for this visit:    Medicare annual wellness visit, subsequent (Primary)    Type 2 diabetes mellitus without complication, without long-term current use of insulin (CMS/[facility])  Assessment & Plan:  Good diabetic control.    A1c 6.2  - goal < 6.5  Current medications reviewed.  Recommend diet modifications and weight loss.   Met with patient to review diet, exercise, and glucometer readings  Reviewed medications and the importance of compliance  Diabetes health maintenance plan and follow up was discussed and understood by patient    Orders:  -     Hemoglobin A1c; Future  -     Basic metabolic panel; Future    Mixed hyperlipidemia  Assessment & Plan:  IMPROVED hyperlipidemia control    LDL 98  - goal < " 80  CONTINUE CURRENT THERAPY  -  REINFORCED DIET  Recommend low fat diet - Risks and potential complications of hyperlipidemia reviewed  Role of medications,diet, exercise in the treatment of hyperlipidemia discussed   Treatment plan and follow up reviewed and understood by patient      Burgos's esophagus without dysplasia  Assessment & Plan:  Asymptomatic - serial endoscopy   Continue PPI      Nocturia  Assessment & Plan:  Prostate cancer screening discussed  - check PSA      Hx of malignant melanoma  Assessment & Plan:  SHARRON      Needs flu shot  -     Influenza vaccine Quad Adjuvanted 65 and Older (FluAd Quad)      See Patient Instructions (the written plan) which was given to the patient for PPPS and health risk factors with interventions.

## 2022-11-21 NOTE — PATIENT INSTRUCTIONS
Your Personalized Prevention Plan Services (PPPS)    Preventive Services Checklist (Assumes Average Risk Unless Otherwise Noted):    Health Maintenance Topics with due status: Overdue       Topic Date Due    Depression Screening Never done    DTaP, Tdap, and Td Vaccines Never done    Zoster Vaccine Never done    Abdominal Aortic Aneurysm (AAA) Screen Never done    Annual Dilated Retinal Exam 05/19/2021    COVID-19 Vaccine 10/27/2021    Influenza Vaccine 08/01/2022     Health Maintenance Topics with due status: Not Due       Topic Last Completion Date    Colorectal Cancer Screening 04/23/2013    Diabetic Foot Exam 07/24/2022    Diabetes Kidney Health Evaluation: eGFR 11/15/2022    Diabetes Kidney Health Evaluation:  uACR 11/15/2022    Hemoglobin A1C 11/15/2022     Health Maintenance Topics with due status: Completed       Topic Last Completion Date    Hepatitis C Screening 08/24/2016    Pneumococcal (65 years and older) 12/22/2016    Annual Falls Risk Screening 11/21/2022     Health Maintenance Topics with due status: Aged Out       Topic Date Due    Meningococcal ACWY Aged Out    HIB Vaccines Aged Out    IPV Vaccines Aged Out    HPV Vaccines Aged Out       You May Be Eligible for These Additional Preventive Services   (Assumes Average Risk Unless Otherwise Noted)  Diabetes Screening Any 1 risk factor: hypertension, dyslipidemia, obesity, high glucose; or Any 2 risk factors: >=64yo, overweight, family history diabetes (covered every 6 months)   Hepatitis C Screening Any 1 risk factor: 1) blood transfusion before 1992,   2) current or past injection drug use (annually for high risk; if born between 2929-7240, see above for status).   Vaccine: Hepatitis B As necessary if at-risk: hemophilia, ESRD, diabetes, living with individual infected with hep B, healthcare worker with frequent contact with blood/bodily fluids (series covered once)   Sexually Transmitted Diseases (STDs) As necessary  chlamydia, gonorrhea, syphilis, hepatitis B (covered annually)  HIV if any 1 risk factor present: 1) <14yo or >64yo and at increased risk or 2) 15-64yo and ask for it (covered annually)   Lung Cancer Screening Low dose chest CT if all three risk factors: 1) 50-76yo, 2) smoker or quit within last 15y, 3) >=20 pack years (covered annually).  No results found for this or any previous visit.       Cholesterol Screening No signs or symptoms of cardiovascular disease (screening covered every 5 years).     Prostate Cancer Screening >= 51yo if patient desires test after weighting potential harms and benefits (covered annually)     Abdominal Aortic Aneurysm Screening Any 1 risk factor: 1) family history of AAA or 2) 65-76yo and smoked 100+ cigarettes in a lifetime (covered once).   No results found for this or any previous visit.   No results found for this or any previous visit.       Glaucoma Screening Any 1 risk factor: 1) diabetes, 2) family history glaucoma, 3)  >=51yo, 4)  American >=64yo (covered annually)           Health Risk Factors with Personalized Education:  ----------------------------------------------------------------------------------------------------------------------  Controlling Your Diabetes  Stress can raise your blood sugar.  Learn what causes your stress.  Learn to lower your stress by spending time with family and friends, exercising, deep breathing, trying meditation or yoga, enjoying hobbies and getting enough rest.  Let your PCP know if youre having problems limiting your stress.  Maintain a healthy weight by balancing your diet and exercise.  Choose foods that are lower in calories, saturated fat, trans fat, sugar and salt.  Eat foods with more fiber, like whole grain cereals, bread, crackers, rice or pasta.  Choose to eat fruit, vegetables, and low-fat or fat-free/skim dairy.  Reduce the portion size of your meals.  Make half of your meal vegetables and fruit, and  divide the other half between lean protein and whole grains.  Drink water instead of juice and regular soda.  Spend at least some time being active on most days of the week.  Work to increase your muscle strength at least twice per week.  Try things like light weights, stretch bands, yoga, heavy gardening (digging, planting with tools) or push-ups.  If you have been prescribed medication, take it regularly and exactly as prescribed.  Let your PCP know if you have any problems or questions about your medication.  If you have been asked to check your blood sugar, write down your readings and share them with your PCP  ----------------------------------------------------------------------------------------------------------------------  Controlling Your Cholesterol  Reduce the amount of saturated and trans fat in your diet.  Limit intake of red meat.  Consume only low-fat or non-fat/skim dairy.  Limit fried food.  Cook with vegetable oils.  Reduce your intake of sugary foods, sugary drinks and alcohol.  Eat a diet high in fruit, vegetables and whole grains.  Get protein from fish, poultry and a small portion of nuts.  Stay active.  Try to get at least 90 to 150 minutes of exercise per week.  Try brisk walking, swimming, bicycling or dancing.  Maintain a healthy weight by balancing your diet and exercise.  If you have been prescribed medication, take it regularly and exactly as prescribed. Let your PCP know if you have any problems or questions about your medication.  Its important to know your cholesterol numbers.  When recommended by your PCP, get the cholesterol blood test.  ----------------------------------------------------------------------------------------------------------------------  Reducing Your Risk of Falls  Tell your PCP if any of your medications make you feel tired, dizzy, lightheaded or off-balance.  Maintain coordination, flexibility and balance by ensuring regular physical activity.  Limit alcohol  intake to 1 drink per day.  Consider avoiding all alcohol intake.  Ensure good vision.  Visit an ophthalmologist or optometrist regularly for vision screening or to make sure your glasses / contact lens prescription is correct.  If you need glasses or contacts, wear them.  When you get new glasses or contacts, take time to get used to them.  Do not wear sunglasses or tinted lenses when indoors.  Ensure good hearing.  Have your hearing checked if you are having trouble hearing, or family and friends think you cannot hear them.  If you need a hearing aid, be sure it fits well and wear it.  Get enough rest.  Ensure about 7-9 hours of sleep every day.  Get up slowly from your bed or chairs.  Do not start walking until you are sure you feel steady.  Wear non-skid, rubber-soled, low-heeled shoes.  Do not walk in socks, or in shoes and slippers with smooth soles.  If your PCP or therapist recommends using a cane or walker, use it regularly.  Make your home safer.  Increase lighting throughout the house, especially at the top and bottom of stairs.  Ensure lighting is easily turned on when getting up in the middle of the night.  Make sure there are two secure rails on all stairs.  Install grab bars in the bathtub / shower and near the toilet.  Consider using a shower chair and / or a hand-held shower.  Spread sand or salt on icy surfaces.  Beware of wet surfaces, which can be icy.  Tell your PCP if you have fallen.    SAME MEDS  KEEP UP THE GOOD WORK     Patient was given routine advice on diet, exercise, and weight reduction.  Recommend a repeat PE in 1 year  The pros and cons on PSA testing were discussed and he has chosen to have a PSA done  Discussed colorectal screening

## 2022-11-26 ASSESSMENT — ENCOUNTER SYMPTOMS
FREQUENCY: 0
SHORTNESS OF BREATH: 0
SLEEP DISTURBANCE: 0
SINUS PRESSURE: 0
UNEXPECTED WEIGHT CHANGE: 0
HEMATURIA: 0
ADENOPATHY: 0
PALPITATIONS: 0
BRUISES/BLEEDS EASILY: 0
COUGH: 0
SORE THROAT: 0
BLOOD IN STOOL: 0
CONSTIPATION: 0
CHILLS: 0
DIARRHEA: 0
NUMBNESS: 0
DYSURIA: 0
NAUSEA: 0
ABDOMINAL PAIN: 0
ARTHRALGIAS: 0
FATIGUE: 0
HEADACHES: 0
CHEST TIGHTNESS: 0
COLOR CHANGE: 0
DIZZINESS: 0
SINUS PAIN: 0
VOICE CHANGE: 0

## 2022-11-26 NOTE — ASSESSMENT & PLAN NOTE
Prostate cancer screening discussed  - check PSA

## 2022-12-12 LAB — MLHC DIABETIC EYE EXAM (EXTERNAL): NORMAL

## 2022-12-19 RX ORDER — METFORMIN HYDROCHLORIDE 500 MG/1
TABLET ORAL
Qty: 90 TABLET | Refills: 1 | Status: SHIPPED | OUTPATIENT
Start: 2022-12-19

## 2022-12-19 NOTE — TELEPHONE ENCOUNTER
Medicine Refill Request    Last Office: 11/21/2022   Last Consult Visit: 9/15/2021  Last Telemedicine Visit: 4/14/2020 Della Hernandez,     Next Appointment: 2/23/2023      Current Outpatient Medications:   •  metFORMIN (GLUCOPHAGE) 500 mg tablet, TAKE 1 TABLET BY MOUTH EVERY DAY WITH DINNER, Disp: 90 tablet, Rfl: 1  •  multivitamin (THERAGRAN) tablet, take 1 tablet by oral route  every day, Disp: , Rfl:   •  pantoprazole (PROTONIX) 40 mg EC tablet, Take 1 tablet (40 mg total) by mouth once daily., Disp: 90 tablet, Rfl: 1  •  simvastatin (ZOCOR) 10 mg tablet, Take 1 tablet (10 mg total) by mouth nightly., Disp: 90 tablet, Rfl: 1      BP Readings from Last 3 Encounters:   11/21/22 134/70   07/21/22 128/60   03/21/22 130/62       Recent Lab results:  Lab Results   Component Value Date    CHOL 139 11/15/2022   ,   Lab Results   Component Value Date    HDL 44 11/15/2022   ,   Lab Results   Component Value Date    LDLCALC 79 11/15/2022   ,   Lab Results   Component Value Date    TRIG 82 11/15/2022        Lab Results   Component Value Date    GLUCOSE 112 (H) 11/15/2022   ,   Lab Results   Component Value Date    HGBA1C 6.2 (H) 11/15/2022         Lab Results   Component Value Date    CREATININE 0.78 11/15/2022       Lab Results   Component Value Date    TSH 0.894 03/15/2022

## 2023-01-16 RX ORDER — PANTOPRAZOLE SODIUM 40 MG/1
40 TABLET, DELAYED RELEASE ORAL
Qty: 90 TABLET | Refills: 1 | Status: SHIPPED | OUTPATIENT
Start: 2023-01-16

## 2023-02-13 RX ORDER — SIMVASTATIN 10 MG/1
10 TABLET, FILM COATED ORAL NIGHTLY
Qty: 90 TABLET | Refills: 1 | Status: SHIPPED | OUTPATIENT
Start: 2023-02-13

## 2023-03-27 ENCOUNTER — OFFICE VISIT (OUTPATIENT)
Dept: FAMILY MEDICINE | Facility: CLINIC | Age: 73
End: 2023-03-27
Payer: MEDICARE

## 2023-03-27 VITALS
BODY MASS INDEX: 22.47 KG/M2 | HEIGHT: 66 IN | OXYGEN SATURATION: 95 % | HEART RATE: 59 BPM | WEIGHT: 139.8 LBS | SYSTOLIC BLOOD PRESSURE: 150 MMHG | TEMPERATURE: 98 F | RESPIRATION RATE: 16 BRPM | DIASTOLIC BLOOD PRESSURE: 72 MMHG

## 2023-03-27 DIAGNOSIS — I10 PRIMARY HYPERTENSION: ICD-10-CM

## 2023-03-27 DIAGNOSIS — R01.1 SYSTOLIC MURMUR: ICD-10-CM

## 2023-03-27 DIAGNOSIS — Z12.11 COLON CANCER SCREENING: ICD-10-CM

## 2023-03-27 DIAGNOSIS — E11.9 TYPE 2 DIABETES MELLITUS WITHOUT COMPLICATION, WITHOUT LONG-TERM CURRENT USE OF INSULIN (CMS/HCC): Primary | ICD-10-CM

## 2023-03-27 DIAGNOSIS — Z87.891 HISTORY OF SMOKING: ICD-10-CM

## 2023-03-27 PROCEDURE — G8753 SYS BP > OR = 140: HCPCS | Performed by: STUDENT IN AN ORGANIZED HEALTH CARE EDUCATION/TRAINING PROGRAM

## 2023-03-27 PROCEDURE — 99215 OFFICE O/P EST HI 40 MIN: CPT | Performed by: STUDENT IN AN ORGANIZED HEALTH CARE EDUCATION/TRAINING PROGRAM

## 2023-03-27 PROCEDURE — G8754 DIAS BP LESS 90: HCPCS | Performed by: STUDENT IN AN ORGANIZED HEALTH CARE EDUCATION/TRAINING PROGRAM

## 2023-03-27 ASSESSMENT — PATIENT HEALTH QUESTIONNAIRE - PHQ9: SUM OF ALL RESPONSES TO PHQ9 QUESTIONS 1 & 2: 0

## 2023-03-27 NOTE — PATIENT INSTRUCTIONS
I would like you to buy an automatic home blood pressure cuff that goes over the arm. I recommend the omron brand. Please check your blood pressures at home for 3 days. I'd like for you to check two back to back readings in the morning and again in the evening for a total of 4 blood pressure readings per day. You should be sitting for 15 minutes and have no caffeine within at least 30 minutes of checking your pressure. Your feet should be flat on the floor and arm supported on a table or pillow at the level of your heart.    Please return to the office in 3 to 4 weeks to review these home blood pressures and recheck your pressure here in the office.

## 2023-03-27 NOTE — ASSESSMENT & PLAN NOTE
Well controlled with metformin 500 mg once daily and weight loss. Will repeat labs at follow up in one to two months.

## 2023-03-27 NOTE — PROGRESS NOTES
NEW PATIENT VISIT    Kamala Garcia D.O.  [facility]  [address]  [address]  [place] [address]  [phone]      HISTORY OF PRESENT ILLNESS        Chief Complaint   Patient presents with   • Establish Care        HPI:  Zeb Jefferson is a 72 y.o. male presenting to discuss the following.    # T2DM  Taking Metformin 500 mg daily once daily  Last saw eye doctor in Nov 2022    # HLD  Taking Simvastatin 10 mg    # Burgos's esophagus  Taking Omeprazole 40 mg    # HTN  Does not take any blood pressure medication  Does not check his home blood pressures    #  History of smoking  Quit 50 years ago  Smoked for 6 months to a year    Specialists: follows with dermatologist due to history of melanoma, ophthalmologist for diabetes  Dentist: follows up every 6 months  AAA screening: due now    Diet: microwave meals, does eat veges, enjoys salad  Exercise: stretching in the am, walks around his development, yard work  Occupation: retired, 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  Home:  for 10 years, lives alone, has coy fish     PAST MEDICAL AND SURGICAL HISTORY        Past Medical History:   Diagnosis Date   • GERD (gastroesophageal reflux disease)    • Hypertension    • Melanoma (CMS/HCC)     under care of derm   • Type 2 diabetes mellitus (CMS/HCC)        Past Surgical History:   Procedure Laterality Date   • SKIN CANCER DESTRUCTION       MEDICATIONS        Current Outpatient Medications on File Prior to Visit   Medication Sig   • metFORMIN (GLUCOPHAGE) 500 mg tablet TAKE 1 TABLET BY MOUTH EVERY DAY WITH DINNER   • multivitamin (THERAGRAN) tablet take 1 tablet by oral route  every day   • pantoprazole (PROTONIX) 40 mg EC tablet Take 1 tablet (40 mg total) by mouth once daily.   • simvastatin (ZOCOR) 10 mg tablet Take 1 tablet (10 mg total) by mouth nightly.     No current facility-administered medications on file prior to visit.        ALLERGIES        Penicillins  FAMILY HISTORY  "       No family history on file.  SOCIAL/ TOBACCO HISTORY        Social History     Tobacco Use   • Smoking status: Former   • Smokeless tobacco: Never   Substance Use Topics   • Alcohol use: Yes   • Drug use: No     REVIEW OF SYSTEMS        All systems reviewed and negative except as otherwise stated in HPI     PHYSICAL EXAMINATION      Visit Vitals  BP (!) 150/72 (BP Location: Left upper arm, Patient Position: Sitting)   Pulse (!) 59   Temp 36.7 °C (98 °F) (Temporal)   Resp 16   Ht 1.676 m (5' 6\")   Wt 63.4 kg (139 lb 12.8 oz)   SpO2 95%   BMI 22.56 kg/m²        Body mass index is 22.56 kg/m².     Wt Readings from Last 3 Encounters:   03/27/23 63.4 kg (139 lb 12.8 oz)   11/21/22 62.6 kg (138 lb)   07/21/22 63 kg (139 lb)        BMI Readings from Last 3 Encounters:   03/27/23 22.56 kg/m²   11/21/22 24.45 kg/m²   07/21/22 24.62 kg/m²        Physical Exam  Constitutional:       General: He is not in acute distress.     Appearance: Normal appearance. He is not ill-appearing or toxic-appearing.   HENT:      Right Ear: Tympanic membrane and ear canal normal.      Left Ear: Tympanic membrane and ear canal normal.      Mouth/Throat:      Pharynx: No oropharyngeal exudate or posterior oropharyngeal erythema.   Cardiovascular:      Rate and Rhythm: Normal rate and regular rhythm.      Heart sounds: Murmur (2/6 systolic murmur) heard.   Pulmonary:      Effort: Pulmonary effort is normal. No respiratory distress.      Breath sounds: No stridor. No wheezing, rhonchi or rales.   Abdominal:      General: There is no distension.      Palpations: Abdomen is soft. There is no mass.      Tenderness: There is no abdominal tenderness. There is no guarding or rebound.      Hernia: No hernia is present.   Musculoskeletal:      Right lower leg: No edema.      Left lower leg: No edema.   Neurological:      Mental Status: He is alert.          ASSESSMENT AND PLAN   Diagnoses and all orders for this visit:    Type 2 diabetes mellitus " without complication, without long-term current use of insulin (CMS/HCC) (Primary)  Assessment & Plan:  Well controlled with metformin 500 mg once daily and weight loss. Will repeat labs at follow up in one to two months.      Systolic murmur  -     Transthoracic echo (TTE) complete; Future    History of smoking  -     ULTRASOUND AAA SCREENING; Future    Primary hypertension  Assessment & Plan:  Does not take medication, although BP is 150/72, above goal. Will monitor home BP's and return in 1 to 2 months.       Colon cancer screening  Assessment & Plan:  Following with GI. Due for screening colonoscopy. He will call his GI to schedule.           Current Outpatient Medications:   •  metFORMIN (GLUCOPHAGE) 500 mg tablet, TAKE 1 TABLET BY MOUTH EVERY DAY WITH DINNER, Disp: 90 tablet, Rfl: 1  •  multivitamin (THERAGRAN) tablet, take 1 tablet by oral route  every day, Disp: , Rfl:   •  pantoprazole (PROTONIX) 40 mg EC tablet, Take 1 tablet (40 mg total) by mouth once daily., Disp: 90 tablet, Rfl: 1  •  simvastatin (ZOCOR) 10 mg tablet, Take 1 tablet (10 mg total) by mouth nightly., Disp: 90 tablet, Rfl: 1     Return in about 4 weeks (around 4/24/2023) for BP and diabetes follow up.       I spent 40 minutes on this date of service performing the following activities: obtaining history, performing examination, entering orders, documenting, preparing for visit, obtaining / reviewing records, providing counseling and education and independently reviewing study/studies.    Kamala Garcia,   3/27/2023

## 2023-03-27 NOTE — ASSESSMENT & PLAN NOTE
Does not take medication, although BP is 150/72, above goal. Will monitor home BP's and return in 1 to 2 months.

## 2023-07-15 NOTE — ASSESSMENT & PLAN NOTE
Asymptomatic - serial endoscopy - due 2022  Continue PPI  
FAIR hyperlipidemia control     - goal < 80  CONTINUE CURRENT THERAPY  -  REINFORCED DIET  Recommend low fat diet - Risks and potential complications of hyperlipidemia reviewed  Role of medications,diet, exercise in the treatment of hyperlipidemia discussed   Treatment plan and follow up reviewed and understood by patient  
Good diabetic control.    A1c 6.2  - goal < 6.5  Current medications reviewed.  Recommend diet modifications and weight loss.   Met with patient to review diet, exercise, and glucometer readings  Reviewed medications and the importance of compliance  Diabetes health maintenance plan and follow up was discussed and understood by patient  
Prostate cancer screening discussed  - check PSA  
SHARRON  
Negative

## 2024-02-13 ENCOUNTER — RX ONLY (RX ONLY)
Age: 74
End: 2024-02-13

## 2024-02-13 RX ORDER — CLINDAMYCIN HYDROCHLORIDE 150 MG/1
CAPSULE ORAL
Qty: 30 | Refills: 0 | Status: ERX | COMMUNITY
Start: 2024-02-13

## 2024-04-08 ENCOUNTER — APPOINTMENT (OUTPATIENT)
Age: 74
Setting detail: DERMATOLOGY
End: 2024-04-08

## 2024-04-08 PROBLEM — C44.41 BASAL CELL CARCINOMA OF SKIN OF SCALP AND NECK: Status: ACTIVE | Noted: 2024-04-08

## 2024-04-08 PROCEDURE — 13121 CMPLX RPR S/A/L 2.6-7.5 CM: CPT

## 2024-04-08 PROCEDURE — OTHER MOHS SURGERY: OTHER

## 2024-04-08 PROCEDURE — 17311 MOHS 1 STAGE H/N/HF/G: CPT

## 2024-04-08 PROCEDURE — 17312 MOHS ADDL STAGE: CPT

## 2024-04-08 NOTE — PROCEDURE: MOHS SURGERY
Special Stains Stage 12 - Results: Base On Clearance Noted Above
Show Eye Protection Variable: Yes
Localized Dermabrasion With Wire Brush Text: First, a scalpel was used to shave remove protuberant scar/wound edges and sebaceous hyperplasia.  Next, sterilized sandpaper was used to physically abrade to papillary dermis. This spot dermabrasion was performed to complete skin cancer reconstruction. It also will eliminate the other sun damaged precancerous cells that are known to be part of the regional effect of a lifetime's worth of sun exposure. This localized dermabrasion is therapeutic and should not be considered cosmetic in any regard.
Quadrants Reporting?: 0
Number Of Hemigard Strips Per Side: 1
Rhomboid Transposition Flap Text: Because of orientation and full-thickness nature of the defect, a rhombic transposition flap was planned. After prep and anesthesia, the rhombic flap was carefully incised to straddle relaxed skin tension lines and the anticipated Burow's triangle removed. The flap was raised and the wound edges were all undermined in the subcutaneous plane. After hemostasis, the flap was transposed/carried over into the defect and sutured in a layered fashion.
Interpolation Flap Text: In order to maintain the form and function of the airway, and to maintain the alar groove, a two-stage interpolation axial flap and staged reconstruction was planned for closure.  A telfa template was made of the defect.  This was then used to outline the cheek interpolation flap.  The donor area for the pedicle flap was then anesthetized.  The flap was incised through the skin and subcutaneous tissue down to the layer of the underlying musculature.  The flap was carefully incised within the deep plane to maintain its blood supply. The pedicle was then rotated into position and sutured.  \\n\\nTo close the donor-site defect on the cheek, an advancement flap was created by wide undermining laterally in the subcutaneous plane. After hemostasis was obtained, this flap was carried over medially and sutured in a layered fashion.  The portion of the advancement flap near the pedicle of the interpolation flap was closed with care, so as not to constrict the vasculature of the pedicle.   \\n\\nOnce the flaps were sutured into place, adequate blood supply was confirmed with blanching and refill.  The pedicle was wrapped with xeroform gauze and dressed with telfa and gauze bandage to ensure continued blood supply and protect the attached pedicle.
Stage 1: Number Of Blocks?: 2
Unique Flap 1 Name: Nasal Advancement Flap
Validate Patient Name (Can Hide Patient Name In Settings Tab): No
Eyelid Full Thickness Repair - 70691: The eyelid defect was full thickness which required a wedge repair of the eyelid. Special care was taken to ensure that the eyelid margin was realligned when placing sutures.
Basaloid Carcinoma Histology Text: There were aggregates of basaloid cells.
Mohs Histo Method Verbiage: Each section was then chromacoded and processed in the Mohs lab using the Mohs protocol and submitted for frozen section.
Melanocytic Tumor Of Uncertain Malignant Potential Histology Text: Proliferation of MART-1+ cells.
Mastoid Interpolation Flap Text: Due to the full-thickness nature of the wound and to restore structure/function, a decision was made to reconstruct the defect utilizing an interpolation axial flap and a staged reconstruction.  A telfa template was made of the defect.  This telfa template was then used to outline the mastoid interpolation flap.  The donor area for the pedicle flap was then injected with anesthesia.  The flap was excised through the skin and subcutaneous tissue down to the layer of the underlying musculature.  The pedicle flap was carefully excised within this deep plane to maintain its blood supply.  The edges of the donor site were undermined.   The donor site was closed in a primary fashion.  The pedicle was then rotated into position and sutured.  Once the tube was sutured into place, adequate blood supply was confirmed with blanching and refill.  The pedicle was then wrapped with xeroform gauze and dressed appropriately with a telfa and gauze bandage to ensure continued blood supply and protect the attached pedicle.
Abbe Flap (Upper To Lower Lip) Text: The defect of the lower lip was assessed and measured.  Given the location and size of the defect, an Abbe flap was deemed most appropriate.  Using a sterile surgical marker, an appropriate Abbe flap was measured and drawn on the upper lip. Local anesthesia was then infiltrated.  A scalpel was then used to incise the upper lip through and through the skin, vermilion, muscle and mucosa, leaving the flap pedicled on the opposite side.  The flap was then rotated and transferred to the lower lip defect.  The flap was then sutured into place with a three layer technique, closing the orbicularis oris muscle layer with subcutaneous buried sutures, followed by a mucosal layer and an epidermal layer.
Scc Ka Subtype Histology Text: There were aggregates of glassy squamous cells consistent with keratoacanthoma.
Referred To Asc For Closure Text (Leave Blank If You Do Not Want): After obtaining clear surgical margins the patient was sent to an ASC for surgical repair.  The patient understands they will receive post-surgical care and follow-up from the ASC physician.
Same Histology In Subsequent Stages Text: The pattern and morphology of the tumor is as described in the first stage.
Anesthesia Type: 1% lidocaine with 1:100,000 epinephrine and a 1:10 solution of 8.4% sodium bicarbonate
Bcc  Morpheaform/Sclerosing Histology Text: There were aggregates of basaloid cells demonstrating a morpheaform/sclerosing pattern.
Mid-Level Procedure Text (B): After obtaining clear surgical margins the patient was sent to a mid-level provider for surgical repair.  The patient understands they will receive post-surgical care and follow-up from the mid-level provider.
Length To Time In Minutes Device Was In Place: 10
Stage 12: Additional Anesthesia Type: 1% lidocaine with epinephrine
Advancement Flap (Single) Text: Because of the full-thickness nature of the wound and in order to displace lines around important structures, a Burow's advancement flap was planned. After prep and local anesthesia, a Burow's triangle was excised adjacent to the defect.  The other Burow's triangle was displaced to hide incisions within skin relaxation lines. Two flaps were created by undermining in the deep subcutaneous plane. After hemostasis, the flaps were carried over and closed in a layered fashion.
Plastic Surgeon Procedure Text (A): After obtaining clear surgical margins the patient was sent to plastics for surgical repair.  The patient understands they will receive post-surgical care and follow-up from the referring physician's office.
Was The Patient On Physician Recommended Anticoagulation Therapy?: Please Select the Appropriate Response
Secondary Intention Text (Leave Blank If You Do Not Want): Due to the superficial nature of the defect and/or patient preference, the wound was allowed to heal by secondary intention.
Scc Spindle Histology Text: There were aggregates of spindle-like squamous cells.
Unique Flap 2 Name: Free Cartilage graft
Skin Substitute Text: Because of the size and depth of the defect and to facilitate healing by granulation, a skin substitute graft was planned.  After prep and local anesthesia, Xenograft material was trimmed to fi the defect and secured circumferentially.
Epidermal Sutures: 4-0 Prolene
Suturegard Body: The suture ends were repeatedly re-tightened and re-clamped to achieve the desired tissue expansion.
Consent (Near Eyelid Margin)/Introductory Paragraph: The rationale for Mohs was explained to the patient and consent was obtained. The risks, benefits and alternatives to therapy were discussed in detail. Specifically, the risks of ectropion or eyelid deformity, infection, scarring, bleeding, prolonged wound healing, incomplete removal, allergy to anesthesia, nerve injury and recurrence were addressed. Prior to the procedure, the treatment site was clearly identified and confirmed by the patient. All components of Universal Protocol/PAUSE Rule completed.
Bi-Rhombic Flap Text: Because of orientation and full-thickness nature of the defect, a rhombic transposition flap was planned. After  prep and anesthesia, two rhombic flaps were carefully incised to straddle relaxed skin tension lines and the anticipated Burow's triangle were removed. The flap was raised and the wound edges were all undermined in the subcutaneous plane. After hemostasis, the flaps were transposed/carried over into the defect and sutured in a layered fashion.
Double O-Z Plasty Text: Because of the full-thickness nature of the defect and location adjacent to important structure(s), a bilateral rotation flap (double O to Z) was planned. After prep and anesthesia, arcuate incisions were extended from two opposite side of the wound.  Two flaps were created by undermining in the subcutaneous plane. After hemostasis was obtained, the flaps were carried over and sutured in a layered fashion.
Crescentic Complex Repair Preamble Text (Leave Blank If You Do Not Want): Extensive wide undermining was performed.
Debridement Text: The wound edges were debrided prior to proceeding with the closure to facilitate wound healing.
Double O-Z Flap Text: Because of the full-thickness nature of the defect and location adjacent to important structure(s), a bilateral rotation flap (O to T) was planned. After prep and anesthesia, arcuate incisions were extended from two opposite side of the wound.  Two flaps were created by undermining in the subcutaneous plane. After hemostasis was obtained, the flaps were carried over and sutured in a layered fashion.
Purse String (Intermediate) Text: In order to control the direction of wound closure, to prevent distortion from wound contraction, and to reduce wound size to facilitate wound care and healing, an intermediate repair was planned.  After prep and anesthesia, and circumferential undermining, subcutaneous and dermal stitches were carefully placed across the wound.
Hatchet Flap Text: Because of the full-thickness nature of the defect and to avoid deformity of free margin of the ala, and after full discussion of the spectrum of repair options, a dorsal nasal rotation flap was planned.  After prep and anesthesia, a Burow???s triangle was excised from the lateral portion superiorly on the nasal sidewall towards the inner canthus and an incision extended from the inferior margin of the wound across the nose and sidewall, then extended superiorly along the nasofacial sulcus and medial cheek through the inner canthus to the glabella where a back cut was made to the contralateral inner canthus.  The flap was elevated by skeletonizing the nasal root, dorsum, and sidewalls.  After hemostasis obtained, the flap was rotated into place, trimmed to fit the defect, and sutured in a layered fashion.
Primary Defect Length In Cm (Final Defect Size - Required For Flaps/Grafts): 4.3
Cheiloplasty (Complex) Text: In order to maintain form and function of the lip, and to avoid distortion of the free margin, a lip advancement flap was planned. After prep and local anesthesia, Burow???s triangles were excised superiorly to the nasal sill and inferiorly around the lip to the labial mucosa.  Two flaps were elevated by undermining the skin laterally in both directions,  the skin and mucosa from the orbicularis muscle.  Any redundant orbicularis oris muscle was removed and complimentary edges of remaining muscle reapposed with a horizontal mattress stitch.  After hemostasis was obtained, the flaps were carried over and closed in a layered fashion.
Consent 1/Introductory Paragraph: The rationale for Mohs was explained to the patient and consent was obtained. The risks, benefits and alternatives to therapy were discussed in detail. Specifically, the risks of infection, scarring, bleeding, prolonged wound healing, incomplete removal, allergy to anesthesia, nerve injury and recurrence were addressed. Prior to the procedure, the treatment site was clearly identified and confirmed by the patient. All components of Universal Protocol/PAUSE Rule completed.
Otolaryngologist Procedure Text (D): After obtaining clear surgical margins the patient was sent to otolaryngology for surgical repair.  The patient understands they will receive post-surgical care and follow-up from the referring physician's office.
Anesthesia Volume In Cc: 3
Width Of Defect Perpendicular To Closure In Cm (Required): 1.9
Mohs Case Number: [id number]
Mustarde Flap Text: Because of full-thickness of the defect and to avoid distortion of the lower eyelid and canthus, a Mustarde cheek rotation flap was planned. After prep and anesthesia, a large Burow???s triangle was excised inferiorly.  An incision was made from the superior portion of the wound over the orbital rim, curving upward onto the temple, then down toward the preauricular crease where another Burow???s triangle was excised.  The full cheek flap was elevated and the wound edges were undermined in the subcutaneous plane. After hemostasis, the flap was rotated into place with care to preserve lower lid position, trimmed at the tip, suspended with a periosteal stitch from the orbital rim, and sutured in a a layered fashion.
V-Y Plasty Text: Because of the full-thickness nature of the wound and to preserve nearby structures, a V-toY Advancement flap was planned. After prep and local anesthesia, a Burow???s triangle was excised adjacent to the defect.  Opposite from this, two smaller Burow???s triangles were excised.  Three flaps were created by undermining in the deep subcutaneous plane. After hemostasis, the flaps were carried over and closed in a layered fashion.
Chonodrocutaneous Helical Advancement Flap Text: Because of the tightness of the surrounding skin, the full-thickness nature of the defect with exposed cartilage, and to avoid deformity, a helical rim advancement flap was planed.  After prep and anesthesia, an incision was made in the anterior portion of the ear through the skin and the cartilage to the posterior perichondrium both superiorly and inferiorly within the scapha of the ear.  Inferiorly, this extended to the lobule where a Burow???s triangle was excised anteriorly.  The chondrocutaneous flaps were then  from the ear posteriorly at the level of the perichondrium to the postauricular sulcus.  A small rim of cartilage was also excised around the contour of the ear and after hemostasis obtained, the chondrocutaneous flaps were carried over and closed in a layered fashion
Hemigard Retention Suture: 2-0 Nylon
Incidental Superficial Basal Cell Carcinoma Histology Text: Incidental superficial BCC was noted at the periphery of the Mohs section and additional stages were performed until clear.
Scc In Situ With Follicular Extension Histology Text: There was squamous cell atypia and proliferation in a SCIS pattern, with follicular or adnexal extension.
Scc Moderately Differentiated Histology Text: There were aggregates of moderately-differentiated squamous cells.
Melolabial Transposition Flap Text: In order to maintain form and function of the airway, and to avoid distortion, a nasolabial transposition flap with cheek advancement flap closure at the donor site was planned. After prep and local anesthesia, a Burow's triangle was excised superiorly toward the inner canthus.  An incision was made inferiorly in the nasolabial fold to the lateral commissure of the mouth, then extended superiorly on the medial cheek where a nasolabial flap was elevated by undermining the skin in the subcutaneous plane of the cheek.  The primary defect on the nose was also undermined. The flap was distally thinned, transposed/carried over into place, amputated at the tip to fit the defect, and sutured to the nose defect in a layered fashion.  See above for size of this flap.  \\n\\nTo close the donor-site defect on the cheek, a cheek advancement flap was elevated by undermining the skin of the cheek in the subcutaneous plane laterally beyond the lateral canthus and superiorly above the orbital rim. After hemostasis was obtained, the flap was carried over medially and attached with peristeal stitches to the pyriform aperture of the maxilla and to the ascending portion of the maxilla. Remaining wound edges were closed in a layered fashion.  This cheek advancement flap measured 3 x 4 cm.
Referring Physician (Optional): BRAYAN Drake
Mixed Nodular And Micronodular Bcc Histology Text: There were aggregates of basaloid cells in a nodular and micro nodular pattern.
Ck7 - Negative Histology Text: CK staining demonstrates a normal density and pattern.
Surgeon Performing Repair (Optional): Dr. oRge Christianson
Island Pedicle Flap Text: In order to avoid distortion of nearby structures, an island pedicle flap was planned.  After prep and local anesthesia, a triangular incision was made.  The flap was dissected to a muscular subcutaneous pedicle.  The skin surrounding the flap was undermined to allow for closure of the donor-site defect. After hemostasis obtained, the flap was carried over into place and sutured in a layered fashion.
Mohs Rapid Report Verbiage: The area of clinically evident tumor was marked with skin marking ink and appropriately hatched.  The initial incision was made following the Mohs approach through the skin.  The specimen was taken to the lab, divided into the necessary number of pieces, chromacoded and processed according to the Mohs protocol.  This was repeated in successive stages until a tumor free defect was achieved.
Staging Info: By selecting yes to the question above you will include information on AJCC 8 tumor staging in your Mohs note. Information on tumor staging will be automatically added for SCCs on the head and neck. AJCC 8 includes tumor size, tumor depth, perineural involvement and bone invasion.
Repair Hemostasis (Optional): Electrodesiccation
Provider Procedure Text (A): After obtaining clear surgical margins the defect was repaired by another provider.
Consent (Temporal Branch)/Introductory Paragraph: The rationale for Mohs was explained to the patient and consent was obtained. The risks, benefits and alternatives to therapy were discussed in detail. Specifically, the risks of damage to the temporal branch of the facial nerve, infection, scarring, bleeding, prolonged wound healing, incomplete removal, allergy to anesthesia, and recurrence were addressed. Prior to the procedure, the treatment site was clearly identified and confirmed by the patient. All components of Universal Protocol/PAUSE Rule completed.
Stage 7: Additional Anesthesia Type: 1% lidocaine with 1:100,000 epinephrine and 408mcg clindamycin/ml and a 1:10 solution of 8.4% sodium bicarbonate
Incidental Actinic Keratosis Histology Text: Incidental AK was noted at the periphery of the Mohs section destruction was performed, pt was was advised to monitor, and/or patient was advised to considered topical 5FU once fully healed.
Postop Diagnosis: same
Split-Thickness Skin Graft Text: Because of the size and thickness of the defect, and to provide coverage and facilitate healing with minimal contraction, a split-thickness skin graft was planned.  The donor site was marked and the graft harvested by scalpel, Weck blade, or dermatome.  The graft was then trimmed to fit the defect.  It was sutured into place and a bolster dressing applied.
Scc Well Differentiated Histology Text: There were aggregates of well-differentiated squamous cells.
Closure 3 Information: This tab is for additional flaps and grafts above and beyond our usual structured repairs.  Please note if you enter information here it will not currently bill and you will need to add the billing information manually.
Bilateral Rotation Flap Text: The defect edges were debeveled with a #15 scalpel blade. Given the location of the defect, shape of the defect and the proximity to free margins a bilateral rotation flap was deemed most appropriate. Using a sterile surgical marker, an appropriate rotation flap was drawn incorporating the defect and placing the expected incisions within the relaxed skin tension lines where possible. The area thus outlined was incised deep to adipose tissue with a #15 scalpel blade. The skin margins were undermined to an appropriate distance in all directions utilizing iris scissors. Following this, the designed flap was carried over into the primary defect and sutured into place.
Alar Island Pedicle Flap Text: The defect edges were debeveled with a #15 scalpel blade.  Given the location of the defect, shape of the defect and the proximity to the alar rim an island pedicle advancement flap was deemed most appropriate.  Using a sterile surgical marker, an appropriate advancement flap was drawn incorporating the defect, outlining the appropriate donor tissue and placing the expected incisions within the nasal ala running parallel to the alar rim. The area thus outlined was incised with a #15 scalpel blade.  The skin margins were undermined minimally to an appropriate distance in all directions around the primary defect and laterally outward around the island pedicle utilizing iris scissors.  There was minimal undermining beneath the pedicle flap.
Consent (Scalp)/Introductory Paragraph: The rationale for Mohs was explained to the patient and consent was obtained. The risks, benefits and alternatives to therapy were discussed in detail. Specifically, the risks of changes in hair growth pattern secondary to repair, infection, scarring, bleeding, prolonged wound healing, incomplete removal, allergy to anesthesia, nerve injury and recurrence were addressed. Prior to the procedure, the treatment site was clearly identified and confirmed by the patient. All components of Universal Protocol/PAUSE Rule completed.
Ear Wedge Repair Text: Due to exposed cartilage and to restore structure and function of the ear, a wedge excision was completed by carrying down an excision through the full thickness of the ear and cartilage with an inward facing Burow's triangle. The wound was then closed in a layered fashion.
Trichoepithelioma Histology Text: There were aggregates of densely blue cells.
Retention Suture Bite Size: 3 mm
Complex Repair And Graft Additional Text (Will Appearing After The Standard Complex Repair Text): The complex repair was not sufficient to completely close the primary defect. The remaining additional defect was repaired with the graft mentioned below.
Oculoplastic Surgeon Procedure Text (E): After obtaining clear surgical margins the patient was sent to oculoplastics for surgical repair.  The patient understands they will receive post-surgical care and follow-up from the referring physician's office.
Bcc Superficial Histology Text: There were aggregates of basaloid cells budding from the epidermis.
Cheiloplasty (Less Than 50%) Text: In order to maintain form and function of the lip, and to avoid distortion of the free margin, a lip advancement flap was planned. After rep and local anesthesia, Burow???s triangles were excised superiorly to the nasal sill and inferiorly around the lip to the labial mucosa.  Two flaps were elevated by undermining the skin laterally in both directions,  the skin and mucosa from the orbicularis muscle.  Any redundant orbicularis oris muscle was removed and complimentary edges of remaining muscle reapposed with a horizontal mattress stitch.  After hemostasis was obtained, the flaps were carried over and closed in a layered fashion.
Where Do You Want The Question To Include Opioid Counseling Located?: Case Summary Tab
Island Pedicle Flap With Canthal Suspension Text: In order to avoid distortion of nearby structures, an island pedicle flap was planned.  After prep and local anesthesia, a triangular incision was made.  The flap was dissected to a muscular subcutaneous pedicle.  The skin surrounding the flap was undermined to allow for closure of the donor-site defect. After hemostasis obtained, the flap was carried over into place and sutured in a layered fashion.   suspension suture was placed in the canthal tendon to prevent tension and prevent ectropion.
Referred To Plastics For Closure Text (Leave Blank If You Do Not Want): After obtaining clear surgical margins the patient was sent to plastics for surgical repair.  The patient understands they will receive post-surgical care and follow-up from the repairing physician's office.
O-T Plasty Text: Because of the full-thickness nature of the defect and location adjacent to important structure(s), a bilateral advancement flap (O to T) was planned. After prep and anesthesia, a Burow's triangle was excised adjacent to the defect.  Incisions were extended from the opposite side of the wound, and smaller Burow???s triangles at the end of each incision.  Two flaps were created by undermining in the subcutaneous plane. After hemostasis was obtained, the flaps were carried over and sutured in a layered fashion.
Staged Advancement Flap Text: Because of the full-thickness nature of the wound and in order to displace lines around important structures, a Burow???s advancement flap was planned. After prep and local anesthesia, a Burow???s triangle was excised adjacent to the defect.  The other Burow's triangle was displaced to hide incisions within skin relaxation lines. Two flaps were created by undermining in the deep subcutaneous plane. After hemostasis, the flaps were carried over and closed in a layered fashion. This is a staged repair and the patient will return for additional surgery.
Scc Acantholytic Histology Text: There were aggregates of squamous cells in an acantholytic pattern.
Helical Rim Text: The closure involved the helical rim.
M-Plasty Intermediate Repair Preamble Text (Leave Blank If You Do Not Want): Undermining was performed with blunt dissection.
Banner Transposition Flap Text: Because of orientation and full-thickness nature of the defect, a long rhombic transposition flap (banner flap) was planned. After prep and anesthesia, the rhombic flap was carefully incised to straddle relaxed skin tension lines and the anticipated Burow's triangle removed. The flap was raised and the wound edges were all undermined in the subcutaneous plane. After hemostasis, the flap was transposed/carried over into the defect and sutured in a layered fashion.
Ear Star Wedge Flap Text: Because of the tightness of the surrounding skin, the full-thickness nature of the defect with exposed cartilage, and to avoid deformity, a star wedge advancement flap was planed.  After prep and anesthesia, a full-thickness triangular wedge of tissue was excised, as well as two brows triangles on either side of this to reduce cupping deformity. The chondrocutaneous flaps were then carried over and closed in a layered fashion.
Unique Flap 1 Text: Because of the full-thickness nature of the defect, and to maintain form and function of the nose, and the proximity to the nasal tip and ala, a bilateral advancement flap was carefully planned. After anesthesia and prep, a Burow's triangle was excised above the defect up to the nasal root, and a Burow???s triangle displaced centrally and excised below the defect down to the columella.  Two laterally-based flaps were created by undermining at the level of the periosteum and perichondrium skeletonizing the nasal tip, dorsum and sidewalls.  After hemostasis, a tension-reduction stitch was placed at the level of the distal nasal bone, and the flaps were sutured together in a layered fashion.
Unique Flap 3 Text: Because of the through-and-through defect of the lateral ala, in order to restore the nose and maintain an open airway, a island-pedicle Spear flap was planned with cheek advancement flap.  After prep and anesthesia, a template was made of the right side of the lip and transferred to the left side to outline the normal anatomic position of the triangular portion of the upper lip.  A template was then made of the lining and outer defect of the left ala and transferred to the medial cheek adjacent to the nasolabial fold.  An island-pedicle flap was incised and elevated and carefully dissected to a muscular subcutaneous pedicle based near the piriform aperture and ascending portion of the maxilla.  This was carefully turned over and sutured to line the ala, then turned up on itself where it was sutured in a layered fashion.  \\n\\nTo close the donor site defect, a cheek flap was elevated by undermining in the subcutaneous plane lateral to the lateral canthus  After hemostasis was obtained the flap was carried over medially, attached to the piriform aperture of the axilla and ascending portion of the maxilla, and then closed in a layered fashion.  The advancement flap measured 3 x 4 cm.
Deep Sutures: 3-0 PDO
Complex Repair And Flap Additional Text (Will Appearing After The Standard Complex Repair Text): The complex repair was not sufficient to completely close the primary defect. The remaining additional defect was repaired with the flap mentioned below.
Mixed Nodular And Infiltrative Bcc Histology Text: There were aggregates of basaloid cells in a nodular and infiltrative pattern.
Crescentic Advancement Flap Text: In order to maintain form and function of the airway, a crescentic advancement flap was planned.  After prep and local anesthesia, a Burow's triangle was excised superior to the defect.  A tangential and curvilinear incision was made onto the medial cheek.  Here, a crescentic Burow's triangle was excised.  The laterally-based flap was then raised by dissection in the deep subcutaneous plane and the remainder of the wound edges were also undermined.  After hemostasis, the flap was carried over into the defect with a periosteal suture at the base of the flap and the lateral nasal bone.  All wound edges were closed in a layered fashion.
Tumor Depth: Less than 6mm from granular layer and no invasion beyond the subcutaneous fat
Distance Of Undermining In Cm (Required): 2.5
Consent (Ear)/Introductory Paragraph: The rationale for Mohs was explained to the patient and consent was obtained. The risks, benefits and alternatives to therapy were discussed in detail. Specifically, the risks of ear deformity, infection, scarring, bleeding, prolonged wound healing, incomplete removal, allergy to anesthesia, nerve injury and recurrence were addressed. Prior to the procedure, the treatment site was clearly identified and confirmed by the patient. All components of Universal Protocol/PAUSE Rule completed.
Bilobed Transposition Flap Text: Because of the orientation and full-thickness nature of the defect, and in order to avoid distortion of the surrounding structures, a bilobed flap was planned.  After prep and local anesthesia, the flap was then outlined, incised and elevated.  The skin was widely undermined to allow for closure of the donor site defect.  After hemostasis obtained, the flaps were transposed/carried over into place and sutured in a layered fashion.
Surgeon/Pathologist Verbiage (Will Incorporate Name Of Surgeon From Intro If Not Blank): operated in two distinct and integrated capacities as the surgeon and pathologist.
Helical Rim Advancement Flap Text: Because of the tightness of the surrounding skin, the full-thickness nature of the defect with exposed cartilage, and to avoid deformity, a helical rim advancement flap was planned.  After prep and anesthesia, any protuberant cartilage or cartilage in the way of tissue movement and approximation was excised.  Then, an incision was made in the anterior portion of the ear through the skin to the posterior ear at the level of the perichondrium both superiorly and inferiorly.  Inferiorly, this extended to the lobule where a Burow???s triangle was excised anteriorly.  The flaps were then  from the ear posteriorly at the level of the perichondrium to the postauricular sulcus.  After hemostasis obtained, the flaps were carried over and closed in a layered fashion
Z Plasty Text: In order to reduce appearance and discomfort related to the web, a Z-plasty was designed.  Aftert prep and anesthesia, a horizontal incision was made across the web.  A double transposition flap was incised in a Z-plasty fashion.  The wound margins were widely undermined to elevate two flaps of skin.  After hemostasis was obtained, the flaps were transposed/carried over into place, and sutured in a a layered fashion.
Epidermal Closure: simple interrupted
Spiral Flap Text: In order to maintain form and function of the airway, a spiral rotation flap was planned.  After prep and local anesthesia, an incision was made from the inferior wound edge, tangentially parallel to the alar rim, and then extended superiorly across the alar crease, vertically on the nasal sidewall, and laterally toward the cheek, or onto the cheek with a backcut.  The flap was undermined and after hemostasis was obtained, the flap was rotated down into place.  All wound edges were sutured in a layered fashion.
Retention Suture Text: Retention sutures were placed to support the closure and prevent dehiscence.
Ftsg Text: Because of the full-thickness nature of the defect, to protect underlying structures, and to avoid distortion, a full-thickness skin graft was planned. After prep and local anesthesia, a template was made of the defect and the graft was harvested.  The graft was defatted and trimmed to fit the defect. It was sutured into place circumferentially and a tie-over Bolster dressing was applied.  The donor site was converted to a fusiform defect and was closed in a layered fashion or was closed via a purse string closure with subcutaneous sutures unless documentation states that the donor site healed by second intention. Hemostasis was obtained prior to any repair of the donor site.
Unique Flap 2 Text: Because of the full-thickness nature of the defect and to reduce risk for alar rim retraction, and after discussion of options and risk for alar retraction with this repair, a free cartilage graft was planned.  An incision through the anterior antihelix was made and the skin lifted in the periochondrial plane.  A square of cartilage sized to fit defect.
Bilobed Flap Text: Because of the size and full-thickness nature of the defect, and to maintain form and function of the nose, and to avoid distortion of the nearby nasal tip and ala, a bilobed transposition flap was planned. After prep and local anesthesia, the bilobe was carefully incised with a Burow's triangle oriented superiorly. The flap was raised and the wound edges were undermined in the submuscular plane to the nasofacial junction. After hemostasis, the flaps were transposed/carried over into the defect and sutured in a layered fashion
Non-Graft Cartilage Fenestration Text: The exposed cartilage was fenestrated with a 3mm punch biopsy to help facilitate wound healing, and decrease infection and chondritis.
Previous Accession (Optional): [id number]
Nasalis Myocutaneous Flap Text: Using a #15 blade, an incision was made around the donor flap to the level of the nasalis muscle. Wide lateral undermining was then performed in both the subcutaneous plane above the nasalis muscle, and in a submuscular plane just above periosteum. This allowed the formation of a free nasalis muscle axial pedicle which was still attached to the actual cutaneous flap, increasing its mobility and vascular viability. Hemostasis was obtained with pinpoint electrocoagulation. The flap was mobilized into position and the pivotal anchor points positioned and stabilized with buried interrupted sutures. Subcutaneous and dermal tissues were closed in a multilayered fashion with sutures. Tissue redundancies were excised, and the epidermal edges were apposed without significant tension and sutured with sutures.
Eyelid Partial Thickness Repair - 29601: The eyelid defect was partial thickness which required a wedge repair of the eyelid. Special care was taken to ensure that the eyelid margin was realligned when placing sutures.
Mucosal Advancement Flap Text: Because of the full-thickness nature of the wound and in order to restore and maintain function, a mucosal advancement flap was planned. After prep and local anesthesia, Burow???s triangles were excised adjacent to the defect.  Two flaps were created by undermining in the deep subcutaneous plane over the orbicularis oris. After hemostasis, the flaps were carried over and closed in a layered fashion, with care to maintain vermillion border and oral commissures.
O-T Advancement Flap Text: Because of the full-thickness nature of the defect and location adjacent to important structure(s), a bilateral advancement flap (A to T) was planned. After prep and anesthesia, a Burow's triangle was excised adjacent to the defect.  Incisions were extended from the opposite side of the wound, and smaller Burow's triangles at the end of each incision.  Two flaps were created by undermining in the subcutaneous plane. After hemostasis was obtained, the flaps were carried over and sutured in a layered fashion.
Estimated Blood Loss (Cc): minimal
Manual Repair Warning Statement: We plan on removing the manually selected variable below in favor of our much easier automatic structured text blocks found in the previous tab. We decided to do this to help make the flow better and give you the full power of structured data. Manual selection is never going to be ideal in our platform and I would encourage you to avoid using manual selection from this point on, especially since I will be sunsetting this feature. It is important that you do one of two things with the customized text below. First, you can save all of the text in a word file so you can have it for future reference. Second, transfer the text to the appropriate area in the Library tab. Lastly, if there is a flap or graft type which we do not have you need to let us know right away so I can add it in before the variable is hidden. No need to panic, we plan to give you roughly 6 months to make the change.
Area Suspicious For Tumor Histology Text: An area suspicious for additional tumor was noted and excised with additional stage(s).
Repair Type: Complex Repair
Pinch Graft Text: The defect edges were debeveled with a #15 scalpel blade. Given the location of the defect, shape of the defect and the proximity to free margins a pinch graft was deemed most appropriate. Using a sterile surgical marker, the primary defect shape was transferred to the donor site. The area thus outlined was incised deep to adipose tissue with a #15 scalpel blade.  The harvested graft was then trimmed of adipose tissue until only dermis and epidermis was left. The skin margins of the secondary defect were undermined to an appropriate distance in all directions utilizing iris scissors.  The secondary defect was closed with interrupted buried subcutaneous sutures.  The skin edges were then re-apposed with running  sutures.  The skin graft was then placed in the primary defect and oriented appropriately.
Mixed Superficial And Nodular Bcc Histology Text: There were aggregates of basaloid cells in a superficial and nodular pattern.
Area H Indication Text: Tumors in this location are included in Area H (eyelids, eyebrows, nose, lips, chin, ear, pre-auricular, post-auricular, temple, genitalia, hands, feet, ankles and areola).  Tissue conservation is critical in these anatomic locations.
Mart-1 - Negative Histology Text: MART-1 staining demonstrates a normal density and pattern of melanocytes along the dermal-epidermal junction. The surgical margins are negative for tumor cells.
Missing Epidermis Histology Text: Missing tissue was noted on frozen sections and additional slides were cut until present.  If no additional tissue containing epidermis was available, then an additional stage was excised.
W Plasty Text: The lesion was extirpated to the level of the fat with a #15 scalpel blade.  Given the location of the defect, shape of the defect and the proximity to free margins a W-plasty was deemed most appropriate for repair.  Using a sterile surgical marker, the appropriate transposition arms of the W-plasty were drawn incorporating the defect and placing the expected incisions within the relaxed skin tension lines where possible.    The area thus outlined was incised deep to adipose tissue with a #15 scalpel blade.  The skin margins were undermined to an appropriate distance in all directions utilizing iris scissors.  The opposing transposition arms were then transposed/carried over into place in opposite direction and anchored with interrupted buried subcutaneous sutures.
Double Island Pedicle Flap Text: The defect edges were debeveled with a #15 scalpel blade.  Given the location of the defect, shape of the defect and the proximity to free margins a double island pedicle advancement flap was deemed most appropriate.  Using a sterile surgical marker, an appropriate advancement flap was drawn incorporating the defect, outlining the appropriate donor tissue and placing the expected incisions within the relaxed skin tension lines where possible.    The area thus outlined was incised deep to adipose tissue with a #15 scalpel blade.  The skin margins were undermined to an appropriate distance in all directions around the primary defect and laterally outward around the island pedicle utilizing iris scissors.  There was minimal undermining beneath the pedicle flap. The wound edges were sutured in a layered fashion.
Follicular Atypia Histology Text: Follicular atypia was noted and reviewed with patient, patient was advised to monitor and report any skin changes.
Presence Of Scar Tissue (For Histology): absent
Subsequent Stages Histo Method Verbiage: Using a similar technique to that described above, a thin layer of tissue was removed from all areas where tumor was visible on the previous stage.  The tissue was again oriented, mapped, dyed, and processed as above.
Referred To Oculoplastics For Closure Text (Leave Blank If You Do Not Want): After obtaining clear surgical margins the patient was sent to oculoplastics for surgical repair.  The patient understands they will receive post-surgical care and follow-up from the repairing physician's office.
Donor Site Anesthesia Type: same as repair anesthesia
Consent (Marginal Mandibular)/Introductory Paragraph: The rationale for Mohs was explained to the patient and consent was obtained. The risks, benefits and alternatives to therapy were discussed in detail. Specifically, the risks of damage to the marginal mandibular branch of the facial nerve, infection, scarring, bleeding, prolonged wound healing, incomplete removal, allergy to anesthesia, and recurrence were addressed. Prior to the procedure, the treatment site was clearly identified and confirmed by the patient. All components of Universal Protocol/PAUSE Rule completed.
Post-Care Instructions: I reviewed with the patient in detail post-care instructions. Patient is not to engage in any heavy lifting, exercise, or swimming for the next 14 days. Should the patient develop any fevers, chills, bleeding, severe pain patient will contact the office immediately.
Eye Protection Verbiage: Before proceeding with the stage, a plastic scleral shield was inserted. The globe was anesthetized with a few drops of 1% lidocaine with 1:100,000 epinephrine. Then, an appropriate sized scleral shield was chosen and coated with lacrilube ointment. The shield was gently inserted and left in place for the duration of each stage. After the stage was completed, the shield was gently removed.
Trilobed Flap Text: Because of the size and full-thickness nature of the defect, and to maintain form and function of the nose, and to avoid distortion of the nearby nasal tip and ala, a trilobed transposition flap was planned. After prep and local anesthesia, the trilobe was carefully incised with a Burow's triangle oriented superiorly. The flap was raised and the wound edges were undermined in the submuscular plane to the nasofacial junction. After hemostasis, the flaps were transposed/carried over into the defect and sutured in a layered fashion
Depth Of Tumor Invasion (For Histology): dermis
Composite Graft Text: In order to decrease risk for distortion of the alar rim, a full-thickness skin graft with cartilage graft was planned. After prep and local anesthesia, a template was made of the defect and the graft was harvested from the conchal bowl. After the graft was harvested, a strip of exposed cartilage was also elevated from the conchal bowl.  The cartilage was either inset into pockets on either side of the defect, or minced and placed at the base of the wound.  The skin graft was then defatted and trimmed to fit the defect. It was sutured into place circumferentially and a tie-over Bolster dressing was applied.  Hemostasis was obtained at the donor site which was then bandaged to heal by second intention.
Muscle Hinge Flap Text: Due to the deep nature of the defect, and to ensure survival of the overlying flap or graft repair for cutaneous coverage, the wound was first addressed with a muscle hinge flap.  Three sides of the muscle were incised and flipped over like a page of the book into the defect, and secured with Vicryl stitches.
Bcc Infiltrative Histology Text: There were aggregates of basaloid cells demonstrating an infiltrative pattern.
Advancement-Rotation Flap Text: In order to maintain form and function of the airway, a spiral rotation flap was planned.  After prep and local anesthesia, an incision was made from the inferior, tangentially parallel to the alar rim, and then extended superiorly across the alar crease, vertically on the nasal sidewall, and laterally toward the cheek, or onto the cheek with a backcut.  The flap was undermined and after hemostasis was obtained, the flap was rotated down into place.  All wound edges were sutured in a layered fashion.
Zygomaticofacial Flap Text: Because of full-thickness of the defect and to avoid distortion of the lower eyelid, a full cheek rotation flap was planned. After prep and anesthesia, a large Burow???s triangle was excised inferiorly.  An incision was made from the superior portion of the wound over the orbital rim, curving upward onto the temple, then downward along the preauricular crease and below the ear lobule where another Burow???s triangle was excised.  The full cheek flap was elevated and the wound edges were undermined in the subcutaneous plane. After hemostasis, the flap was rotated into place, trimmed at the tip, suspended with a periosteal stitch from the orbital rim, and sutured in a a layered fashion.
Scc Poorly Differentiated Histology Text: There were aggregates of poorly-differentiated squamous cells.
No Residual Tumor Seen Histology Text: There were no malignant cells seen in the sections examined.
Area M Indication Text: Tumors in this location are included in Area M (cheek, forehead, scalp, neck, jawline and pretibial skin).  Mohs surgery is indicated for tumors in these anatomic locations.
Rotation Flap Text: Because of the full-thickness nature of the defect and proximity to vital structures, a rotation flap was planned. After prep and local anesthesia, the flap was carefully incised along an arc.  A Burow's triangle was removed adjacent to the defect and along the outside of the arc. The flap was raised and the wound edges were undermined in the subcutaneous plane. After hemostasis, the flap was rotated into the defect. The distal tip of the flap was trimmed to fit the defect. The entirety of the flap's arcuate border was sutured in a layered fashion
Incidental Squamous Cell Carcinoma In Situ Histology Text: Incidental SCIS was noted at the periphery of the Mohs section and additional stages or destruction were performed until clear.
Xenograft Text: Because of the size and depth of the defect and to facilitate healing by granulation, a tissue-cultured epidermal autograft was planned.  After prep and local anesthesia, Xenograft material was trimmed to fi the defect and secured
Orbicularis Oris Muscle Flap Text: Due to the deep nature of the defect, location near free margin, and to restore oral sphincter function, an orbicularis oris muscle flap was planned.  Using a sterile surgical marker, an appropriate flap was drawn incorporating the defect. The area thus outlined was incised and carried over into place, re-establishing function and closing the wound, and secured with layered stitches.
Scc In Situ Histology Text: There was squamous cell atypia and proliferation in a SCIS pattern.
Rhombic Flap Text: Because of the size and full-thickness nature of the defect, and in order to maintain form and function while avoiding distortion of the nearby nasal tip and ala, a rhombic transposition flap was planned. After prep and anesthesia, a Burow's triangle was excised laterally, just superior to the alar groove. The rhombic flap was carefully incised superior to the defect.  The flap was raised and the wound edges were all undermined in the subcutaneous plane. After hemostasis, the flap was transposed/carried over into the defect and sutured in a layered fashion.
Home Suture Removal Text: Patient was provided instructions on removing sutures and will remove their sutures at home.  If they have any questions or difficulties they will call the office.
Burow's Graft Text: Because of the full-thickness nature of the wound and surrounding skin tension, a complex linear repair with Burow's graft was planned. After prep and anesthesia, one or two Burow???s triangles were excised adjacent to the defect and placed into sterile saline.  Two flaps were raised bilaterally by undermining widely in the subcutaneous plane. Hemostasis was obtained and the flaps were carried over into the defect with care to avoid distortion, and the wound edges were closed in a layered fashion, leaving a smaller defect.  A Burow???s triangle was defatted and trimmed to fit this remaining defect, and sutured into place circumferentially.
Scc Crateriform Histology Text: There were aggregates of squamous cells.
Epidermal Autograft Text: Because of the location and depth of the defect and to facilitate healing, an epidermal autograft was deemed most appropriate.  The epidermal-dermal pinch grafts were then harvested.  The skin grafts were then placed in the primary defect and oriented appropriately. The grafts were secured with vaseline gauze and bandage.
Intermediate Repair And Flap Additional Text (Will Appearing After The Standard Complex Repair Text): The intermediate repair was not sufficient to completely close the primary defect. The remaining additional defect was repaired with the flap mentioned below.
Paramedian Forehead Flap Text: Because of the size and full-thickness nature of the defect, and to maintain form and function of the nose, a forehead flap with bilateral forehead advancement flap closure of the donor site was planned.  After prep and anesthesia, excisional preparation of the recipient site was performed by outlining the cosmetic unit of the nasal tip.  Hemostasis was obtained.  A template was then made of the defect and transferred with the appropriate length to the upper forehead.  The forehead flap was incised and elevated based on the supratrochlear neurovascular bundle.  This was carefully dissected over the orbital rim to the nasion.  It was distally thinned and transferred to the nasal tip.  This was sutured in a layered fashion.  To close the donor site defect on the forehead, a large Burow???s triangle was excised superiorly and posteriorly into the scalp, and two large flaps were elevated by undermining the entire anterior scalp and forehead to the temples bilaterally, and over the supraorbital rim inferiorly.  After hemostasis was obtained, these flaps were carried over and closed in a layered fashion.
Information: Selecting Yes will display possible errors in your note based on the variables you have selected. This validation is only offered as a suggestion for you. PLEASE NOTE THAT THE VALIDATION TEXT WILL BE REMOVED WHEN YOU FINALIZE YOUR NOTE. IF YOU WANT TO FAX A PRELIMINARY NOTE YOU WILL NEED TO TOGGLE THIS TO 'NO' IF YOU DO NOT WANT IT IN YOUR FAXED NOTE.
Estlander Flap (Upper To Lower Lip) Text: The defect of the lower lip was assessed and measured.  Given the location and size of the defect, an Estlander flap was deemed most appropriate.  Using a sterile surgical marker, an appropriate Estlander flap was measured and drawn on the upper lip. Local anesthesia was then infiltrated. A scalpel was then used to incise the lateral aspect of the flap, through skin, muscle and mucosa, leaving the flap pedicled medially.  The flap was then rotated and positioned to fill the lower lip defect.  The flap was then sutured into place with a three layer technique, closing the orbicularis oris muscle layer with subcutaneous buried sutures, followed by a mucosal layer and an epidermal layer.
Consent 3/Introductory Paragraph: I gave the patient a chance to ask questions they had about the procedure.  Following this I explained the Mohs procedure and consent was obtained. The risks, benefits and alternatives to therapy were discussed in detail. Specifically, the risks of infection, scarring, bleeding, prolonged wound healing, incomplete removal, allergy to anesthesia, nerve injury and recurrence were addressed. Prior to the procedure, the treatment site was clearly identified and confirmed by the patient. All components of Universal Protocol/PAUSE Rule completed.
Suturegard Intro: Intraoperative tissue expansion was performed, utilizing the SUTUREGARD device, in order to reduce wound tension.
Consent 2/Introductory Paragraph: Mohs surgery was explained to the patient and consent was obtained. The risks, benefits and alternatives to therapy were discussed in detail. Specifically, the risks of infection, scarring, bleeding, prolonged wound healing, incomplete removal, allergy to anesthesia, nerve injury and recurrence were addressed. Prior to the procedure, the treatment site was clearly identified and confirmed by the patient. All components of Universal Protocol/PAUSE Rule completed.
Consent (Lip)/Introductory Paragraph: The rationale for Mohs was explained to the patient and consent was obtained. The risks, benefits and alternatives to therapy were discussed in detail. Specifically, the risks of lip deformity, changes in the oral aperture, infection, scarring, bleeding, prolonged wound healing, incomplete removal, allergy to anesthesia, nerve injury and recurrence were addressed. Prior to the procedure, the treatment site was clearly identified and confirmed by the patient. All components of Universal Protocol/PAUSE Rule completed.
Epidermal Closure Graft Donor Site (Optional): running
Rectangular Flap Text: The defect edges were debeveled with a #15 scalpel blade. Given the location of the defect and the proximity to free margins a rectangular flap was deemed most appropriate. Using a sterile surgical marker, an appropriate rectangular flap was drawn incorporating the defect. The area thus outlined was incised deep to adipose tissue with a #15 scalpel blade. The skin margins were undermined to an appropriate distance in all directions utilizing iris scissors. Following this, the designed flap was carried over into the primary defect and sutured into place.
O-L Flap Text: Because of the full-thickness nature of the defect, and to preserve nearby structures and landmarks, a Burow???s advancement flap (L-plasty) was planned. After prep and anesthesia, a Burow's triangle was excised adjacent to the defect.  Perpendicular to this, a line was incised and crescentic Burow???s triangle excised.  The flap was elevated by undermining in the subcutaneous plane. Hemostasis was obtained.  The flap was carried over into the defect with care to avoid distortion and was sutured into place in a layered fashion
Island Pedicle Flap-Requiring Vessel Identification Text: Due to the location on free margin and to restore and maintain airway, an alar IPF was planned.  Given the location of the defect, shape of the defect and the proximity to free margins an island pedicle advancement flap was deemed most appropriate.  Using a sterile surgical marker, an appropriate advancement flap was drawn, based on the axial vessel mentioned above, incorporating the defect, outlining the appropriate donor tissue and placing the expected incisions within the relaxed skin tension lines where possible.    The area thus outlined was incised deep to adipose tissue with a #15 scalpel blade.  The skin margins were undermined to an appropriate distance in all directions around the primary defect and laterally outward around the island pedicle utilizing iris scissors.  There was minimal undermining beneath the pedicle flap.
Which Eyelid Repair Cpt Are You Using?: 14670
Bcc Infundibulocystic Histology Text: There were aggregates of basaloid cells demonstrating an infundibulocystic pattern.
Closure 2 Information: This tab is for additional flaps and grafts, including complex repair and grafts and complex repair and flaps. You can also specify a different location for the additional defect, if the location is the same you do not need to select a new one. We will insert the automated text for the repair you select below just as we do for solitary flaps and grafts. Please note that at this time if you select a location with a different insurance zone you will need to override the ICD10 and CPT if appropriate.
Consent (Nose)/Introductory Paragraph: The rationale for Mohs was explained to the patient and consent was obtained. The risks, benefits and alternatives to therapy were discussed in detail. Specifically, the risks of nasal deformity, changes in the flow of air through the nose, infection, scarring, bleeding, prolonged wound healing, incomplete removal, allergy to anesthesia, nerve injury and recurrence were addressed. Prior to the procedure, the treatment site was clearly identified and confirmed by the patient. All components of Universal Protocol/PAUSE Rule completed.
Pain Refusal Text: I offered to prescribe pain medication but the patient refused to take this medication.
Dfsp Histology Text: There were densely arranged spindle-shaped cells.
Tissue Cultured Epidermal Autograft Text: Because of the size and depth of the defect and to facilitate healing by granulation, a tissue-cultured epidermal autograft was planned.  After prep and local anesthesia, Xenograft material was trimmed to fi the defect and secured circumferentially.
Dressing (No Sutures): no dressing
Unique Flap 3 Name: Spear Flap
Modified Advancement Flap Text: Because of the full-thickness nature of the wound and in order to displace lines around important structures, a Burow???s advancement flap was planned. After prep and local anesthesia, a Burow???s triangle was excised adjacent to the defect.  The other Burow's triangle was displaced to hide incisions within skin relaxation lines. Two flaps were created by undermining in the deep subcutaneous plane. After hemostasis, the flaps were carried over and closed in a layered fashion.
Sox10 - Negative Histology Text: SOX10 staining demonstrates a normal density and pattern of melanocytes along the dermal-epidermal junction. The surgical margins are negative for tumor cells.
Bcc Micronodular Histology Text: There were aggregates of basaloid cells demonstrating a micro nodular pattern.
Undermining Type: Entire Wound
Intermediate Repair Preamble Text (Leave Blank If You Do Not Want): Undermining was performed with sharp dissection.
O-Z Plasty Text: Because of the full-thickness nature of the defect and location adjacent to important structure(s), a bilateral rotation flap (O to Z) was planned. After prep and anesthesia, arcuate incisions were extended from two opposite side of the wound.  Two flaps were created by undermining in the subcutaneous plane. After hemostasis was obtained, the flaps were carried over and sutured in a layered fashion.
Peng Advancement Flap Text: Because of the full-thickness nature of the defect and location adjacent to free margin of alar rims, and to maintain functional airway and alar rim position, a bilateral advancement flap was planned. After prep and anesthesia, incisions were extended from the opposite side of the wound along the alar grooves, and Burow???s triangles at the end of each incision were excised.  Two flaps were created by undermining in the subcutaneous plane skeletonizing the nasal cartilages. After hemostasis was obtained, the flaps were carried over and sutured in a layered fashion.
Double Z Plasty Text: The lesion was extirpated to the level of the fat with a #15 scalpel blade. Given the location of the defect, shape of the defect and the proximity to free margins a double Z-plasty was deemed most appropriate for repair. Using a sterile surgical marker, the appropriate transposition arms of the double Z-plasty were drawn incorporating the defect and placing the expected incisions within the relaxed skin tension lines where possible. The area thus outlined was incised deep to adipose tissue with a #15 scalpel blade. The skin margins were undermined to an appropriate distance in all directions utilizing iris scissors. The opposing transposition arms were then transposed/carried over and carried over into place in opposite direction and anchored with interrupted buried subcutaneous sutures.
H Plasty Text: Given the location of the defect, shape of the defect and the proximity to free margins a H-plasty was deemed most appropriate for repair.  Using a sterile surgical marker, the appropriate advancement arms of the H-plasty were drawn incorporating the defect and placing the expected incisions within the relaxed skin tension lines where possible. The area thus outlined was incised deep to adipose tissue with a #15 scalpel blade. The skin margins were undermined to an appropriate distance in all directions utilizing iris scissors.  The opposing advancement arms were then carried over into place in opposite direction and anchored with interrupted buried subcutaneous sutures.
Scc Desmoplastic Subtype Histology Text: There were aggregates of squamous cells in a desk-plastic pattern.
Fibroepithelioma Of Pinkus Histology Text: There were aggregates of basaloid cells consistent with fibroepithelioma of pinks.
Desmoplastic Trichoepithelioma Histology Text: There were basaloid cell proliferation in an infiltrative sclerosing pattern.
Date Of Previous Biopsy (Optional): 01/26/24
Bilateral Helical Rim Advancement Flap Text: Because of the tightness of the surrounding skin, the full-thickness nature of the defect with exposed cartilage, and to avoid deformity, a helical rim advancement flap was planed.  After prep and anesthesia, an incision was made in the anterior portion of the ear through the skin and the cartilage to the posterior perichondrium both superiorly and inferiorly within the scapha of the ear.  Inferiorly, this extended to the lobule where a Burow???s triangle was excised anteriorly.  The chondrocutaneous flaps were then  from the ear posteriorly at the level of the perichondrium to the postauricular sulcus.  A small rim of cartilage was also excised around the contour of the ear both superiorly and inferiorly, and after hemostasis obtained, the chondrocutaneous flaps were carried over and closed in a layered fashion
Cartilage Graft Text: Because of the laxity of the alar rim resulting from loss of fibrofatty support, and to preserve form and function of the nose, a cartilage graft was planned.  An incision was made into the conchal bowl and a cutaneous flap was elevated. The conchal bowl cartilage was excised and after hemostasis was obtained, the cutaneous flap was replaced and sutured down.  The cartilage graft was then carved to fit the defect.  Two pockets were made medially and laterally in the alar rim, and the cartilage strut was sutured into place with Vicryl suture.
Hemigard Postcare Instructions: The HEMIGARD strips are to remain completely dry for at least 5-7 days.
Dermal Autograft Text: The defect edges were debeveled with a #15 scalpel blade.  Given the location of the defect, shape of the defect and the proximity to free margins a dermal autograft was deemed most appropriate.  Using a sterile surgical marker, the primary defect shape was transferred to the donor site. The area thus outlined was incised deep to adipose tissue with a #15 scalpel blade.  The harvested graft was then trimmed of adipose and epidermal tissue until only dermis was left.  The skin graft was then placed in the primary defect and oriented appropriately.
Bcc Adenoid Histology Text: There were aggregates of basaloid cells demonstrating an adenoid or cribiform pattern.
V-Y Flap Text: Because of the full-thickness nature of the wound and to preserve nearby structures, a V-toY Advancement flap was planned. After prep and local anesthesia, a Burow's triangle was excised adjacent to the defect.  Opposite from this, two smaller Burow's triangles were excised.  Three flaps were created by undermining in the deep subcutaneous plane. After hemostasis, the flaps were carried over and closed in a layered fashion.
Bcc Keratotic Histology Text: There were aggregates of basaloid cells demonstrating a pink keratotic pattern.
Star Wedge Flap Text: Because of the tightness of the surrounding skin, the full-thickness nature of the defect with exposed cartilage, and to avoid deformity, a star wedge advancement flap was planned.  After prep and anesthesia, a full-thickness triangular wedge of tissue was excised, as well as two Burow's triangles on either side of this to reduce cupping deformity. The chondrocutaneous flaps were then carried over and closed in a layered fashion.
Initial Size Of Lesion: 1.2
Consent Type: Consent 1 (Standard)
No Repair - Repaired With Adjacent Surgical Defect Text (Leave Blank If You Do Not Want): After obtaining clear surgical margins the defect was repaired concurrently with another surgical defect which was in close approximation.
Keystone Flap Text: Given the location of the defect, the surrounding tension, to facilitate healing, and the shape of the defect, a keystone flap was planned.  Using a sterile surgical marker, an appropriate keystone flap was drawn incorporating the defect, outlining the appropriate donor tissue and placing the expected incisions within the relaxed skin tension lines where possible. The area thus outlined was incised deep to adipose tissue with a #15 scalpel blade.  The skin margins were undermined to an appropriate distance in all directions around the primary defect and laterally outward around the flap utilizing iris scissors.  The wound edges were sutured in a layered fashion.
Mart-1 - Positive Histology Text: MART-1 staining demonstrates areas of higher density and clustering of melanocytes with Pagetoid spread upwards within the epidermis. The surgical margins are positive for tumor cells.
Posterior Auricular Interpolation Flap Text: Due to location on free margin and exposed cartilage and to restore structure and function, a decision was made to reconstruct the defect utilizing an interpolation axial flap and a staged reconstruction.  A telfa template was made of the defect.  This telfa template was then used to outline the posterior auricular interpolation flap.  The donor area for the pedicle flap was then injected with anesthesia.  The flap was excised through the skin and subcutaneous tissue down to the layer of the underlying musculature.  The pedicle flap was carefully excised within this deep plane to maintain its blood supply.  The edges of the donor site were undermined.   The donor site was closed in a primary fashion.  The pedicle was then rotated into position and sutured.  Once the tube was sutured into place, adequate blood supply was confirmed with blanching and refill.  The pedicle was then wrapped with xeroform gauze and dressed appropriately with a telfa and gauze bandage to ensure continued blood supply and protect the attached pedicle.
Mohs Method Verbiage: An incision following the standard Mohs approach was done and the specimen was harvested as a microscopic controlled layer.
Metatypical Bcc Histology Text: There were aggregates of basaloid cells in a metatypical pattern.
Vermilion Border Text: The closure involved the vermilion border.
Tarsorrhaphy Text: A tarsorrhaphy was performed using Frost sutures.
Brow Lift Text: A midfrontal incision was made medially to the defect to allow access to the tissues just superior to the left eyebrow. Following careful dissection inferiorly in a supraperiosteal plane to the level of the left eyebrow, several 3-0 monocryl sutures were used to resuspend the eyebrow orbicularis oculi muscular unit to the superior frontal bone periosteum. This resulted in an appropriate reapproximation of static eyebrow symmetry and correction of the left brow ptosis.
Scc Sarcomatoid Histology Text: There were aggregates of squamous cells in a sarcomatous pattern.
Intermediate Repair And Graft Additional Text (Will Appearing After The Standard Complex Repair Text): The intermediate repair was not sufficient to completely close the primary defect. The remaining additional defect was repaired with the graft mentioned below.
Abbe Flap (Lower To Upper Lip) Text: The defect of the upper lip was assessed and measured.  Given the location and size of the defect, an Abbe flap was deemed most appropriate.  Using a sterile surgical marker, an appropriate Abbe flap was measured and drawn on the lower lip. Local anesthesia was then infiltrated. A scalpel was then used to incise the upper lip through and through the skin, vermilion, muscle and mucosa, leaving the flap pedicled on the opposite side.  The flap was then rotated and transferred to the lower lip defect.  The flap was then sutured into place with a three layer technique, closing the orbicularis oris muscle layer with subcutaneous buried sutures, followed by a mucosal layer and an epidermal layer.
Repair Anesthesia Method: local infiltration
Mercedes Flap Text: Because of the full-thickness nature of the defect and tightness of surrounding skin, a triple-advancement flap was planned.  After prep and local anesthesia, three Burow's triangles were excised adjacent to the defect.  The wound edges were widely undermined to raise three flaps in the deep subcutaneous plane.  Hemostasis was achieved.  The flaps were then carried over into the defect and sutured into place.
Alternatives Discussed Intro (Do Not Add Period): I discussed alternative treatments to Mohs surgery and specifically discussed the risks and benefits of
Inflammation Suggestive Of Cancer Camouflage Histology Text: There was a dense lymphocytic infiltrate which prevented adequate histologic evaluation of adjacent structures.
X Size Of Lesion In Cm (Optional): 0.8
Wound Care (No Sutures): Petrolatum
Hemigard Intro: Due to skin fragility and wound tension, it was decided to use HEMIGARD adhesive retention suture devices to permit a linear closure. The skin was cleaned and dried for a 6cm distance away from the wound. Excessive hair, if present, was removed to allow for adhesion.
Medical Necessity Statement: Based on my medical judgement, Mohs surgery is the most appropriate treatment for this cancer compared to other treatments.
Referred To Otolaryngology For Closure Text (Leave Blank If You Do Not Want): After obtaining clear surgical margins the patient was sent to otolaryngology for surgical repair.  The patient understands they will receive post-surgical care and follow-up from the repairing physician's office.
Nasal Turnover Hinge Flap Text: Due to the deep nature of the defect, and to restore structure and function, and to cover and ensure survival of underlying tissue, and to provide cutaneous coverage, a cutaneous hinge flap was performed.  Three sides of the full-thickness skin adjacent to the defect were incised and flipped over like a page of the book into the defect, and secured with Vicryl stitches.
Mauc Instructions: By selecting yes to the question below the MAUC number will be added into the note.  This will be calculated automatically based on the diagnosis chosen, the size entered, the body zone selected (H,M,L) and the specific indications you chose. You will also have the option to override the Mohs AUC if you disagree with the automatically calculated number and this option is found in the Case Summary tab.
Consent (Spinal Accessory)/Introductory Paragraph: The rationale for Mohs was explained to the patient and consent was obtained. The risks, benefits and alternatives to therapy were discussed in detail. Specifically, the risks of damage to the spinal accessory nerve, infection, scarring, bleeding, prolonged wound healing, incomplete removal, allergy to anesthesia, and recurrence were addressed. Prior to the procedure, the treatment site was clearly identified and confirmed by the patient. All components of Universal Protocol/PAUSE Rule completed.
Area L Indication Text: Tumors in this location are included in Area L (trunk and extremities).  Mohs surgery is indicated for larger tumors, or tumors with aggressive histologic features, in these anatomic locations.
Nostril Rim Text: The closure involved the nostril rim.
Detail Level: Detailed
Which Instrument Did You Use For Dermabrasion?: Wire Brush
Localized Dermabrasion With Sand Papertext: The patient was draped in routine manner.  Localized dermabrasion using sterile sand paper was performed in routine manner to papillary dermis. This spot dermabrasion is being performed to complete skin cancer reconstruction. It also will eliminate the other sun damaged precancerous cells that are known to be part of the regional effect of a lifetime's worth of sun exposure. This localized dermabrasion is therapeutic and should not be considered cosmetic in any regard.
Localized Dermabrasion With 15 Blade Text: The patient was draped in routine manner.  Localized dermabrasion using a 15 blade was performed in routine manner to papillary dermis. This spot dermabrasion is being performed to complete skin cancer reconstruction. It also will eliminate the other sun damaged precancerous cells that are known to be part of the regional effect of a lifetime's worth of sun exposure. This localized dermabrasion is therapeutic and should not be considered cosmetic in any regard.

## 2024-04-22 ENCOUNTER — APPOINTMENT (OUTPATIENT)
Age: 74
Setting detail: DERMATOLOGY
End: 2024-04-22

## 2024-04-22 DIAGNOSIS — Z48.02 ENCOUNTER FOR REMOVAL OF SUTURES: ICD-10-CM

## 2024-04-22 PROCEDURE — OTHER SUTURE REMOVAL (GLOBAL PERIOD): OTHER

## 2024-04-22 PROCEDURE — 99024 POSTOP FOLLOW-UP VISIT: CPT

## 2024-04-22 ASSESSMENT — LOCATION DETAILED DESCRIPTION DERM: LOCATION DETAILED: RIGHT CENTRAL FRONTAL SCALP

## 2024-04-22 ASSESSMENT — LOCATION SIMPLE DESCRIPTION DERM: LOCATION SIMPLE: RIGHT SCALP

## 2024-04-22 ASSESSMENT — LOCATION ZONE DERM: LOCATION ZONE: SCALP

## 2024-04-22 NOTE — PROCEDURE: SUTURE REMOVAL (GLOBAL PERIOD)
Detail Level: Detailed
Add 38886 Cpt? (Important Note: In 2017 The Use Of 39226 Is Being Tracked By Cms To Determine Future Global Period Reimbursement For Global Periods): yes